# Patient Record
Sex: MALE | Race: WHITE | NOT HISPANIC OR LATINO | URBAN - METROPOLITAN AREA
[De-identification: names, ages, dates, MRNs, and addresses within clinical notes are randomized per-mention and may not be internally consistent; named-entity substitution may affect disease eponyms.]

---

## 2019-09-30 ENCOUNTER — INPATIENT (INPATIENT)
Facility: HOSPITAL | Age: 34
LOS: 3 days | Discharge: HOME | DRG: 638 | End: 2019-10-04
Attending: HOSPITALIST | Admitting: STUDENT IN AN ORGANIZED HEALTH CARE EDUCATION/TRAINING PROGRAM
Payer: MEDICAID

## 2019-09-30 VITALS
SYSTOLIC BLOOD PRESSURE: 133 MMHG | DIASTOLIC BLOOD PRESSURE: 94 MMHG | RESPIRATION RATE: 18 BRPM | WEIGHT: 270.07 LBS | HEIGHT: 72 IN | HEART RATE: 119 BPM | TEMPERATURE: 98 F | OXYGEN SATURATION: 97 %

## 2019-09-30 DIAGNOSIS — E08.10 DIABETES MELLITUS DUE TO UNDERLYING CONDITION WITH KETOACIDOSIS WITHOUT COMA: ICD-10-CM

## 2019-09-30 LAB
ALBUMIN SERPL ELPH-MCNC: 4.6 G/DL — SIGNIFICANT CHANGE UP (ref 3.3–5)
ALP SERPL-CCNC: 79 U/L — SIGNIFICANT CHANGE UP (ref 40–120)
ALT FLD-CCNC: 76 U/L — HIGH (ref 10–45)
ANION GAP SERPL CALC-SCNC: 21 MMOL/L — HIGH (ref 5–17)
ANION GAP SERPL CALC-SCNC: 26 MMOL/L — HIGH (ref 5–17)
APPEARANCE UR: CLEAR — SIGNIFICANT CHANGE UP
AST SERPL-CCNC: 29 U/L — SIGNIFICANT CHANGE UP (ref 10–40)
BACTERIA # UR AUTO: NEGATIVE — SIGNIFICANT CHANGE UP
BASE EXCESS BLDV CALC-SCNC: -14.7 MMOL/L — LOW (ref -2–2)
BASOPHILS # BLD AUTO: 0 K/UL — SIGNIFICANT CHANGE UP (ref 0–0.2)
BASOPHILS NFR BLD AUTO: 0.4 % — SIGNIFICANT CHANGE UP (ref 0–2)
BILIRUB SERPL-MCNC: 0.4 MG/DL — SIGNIFICANT CHANGE UP (ref 0.2–1.2)
BILIRUB UR-MCNC: NEGATIVE — SIGNIFICANT CHANGE UP
BUN SERPL-MCNC: 10 MG/DL — SIGNIFICANT CHANGE UP (ref 7–23)
BUN SERPL-MCNC: 8 MG/DL — SIGNIFICANT CHANGE UP (ref 7–23)
CA-I SERPL-SCNC: 1.19 MMOL/L — SIGNIFICANT CHANGE UP (ref 1.12–1.3)
CALCIUM SERPL-MCNC: 8.3 MG/DL — LOW (ref 8.4–10.5)
CALCIUM SERPL-MCNC: 9 MG/DL — SIGNIFICANT CHANGE UP (ref 8.4–10.5)
CHLORIDE BLDV-SCNC: 109 MMOL/L — HIGH (ref 96–108)
CHLORIDE SERPL-SCNC: 101 MMOL/L — SIGNIFICANT CHANGE UP (ref 96–108)
CHLORIDE SERPL-SCNC: 104 MMOL/L — SIGNIFICANT CHANGE UP (ref 96–108)
CO2 BLDV-SCNC: 12 MMOL/L — LOW (ref 22–30)
CO2 SERPL-SCNC: 11 MMOL/L — LOW (ref 22–31)
CO2 SERPL-SCNC: 8 MMOL/L — CRITICAL LOW (ref 22–31)
COLOR SPEC: SIGNIFICANT CHANGE UP
CREAT SERPL-MCNC: 0.69 MG/DL — SIGNIFICANT CHANGE UP (ref 0.5–1.3)
CREAT SERPL-MCNC: 0.78 MG/DL — SIGNIFICANT CHANGE UP (ref 0.5–1.3)
DIFF PNL FLD: NEGATIVE — SIGNIFICANT CHANGE UP
EOSINOPHIL # BLD AUTO: 0.1 K/UL — SIGNIFICANT CHANGE UP (ref 0–0.5)
EOSINOPHIL NFR BLD AUTO: 1.5 % — SIGNIFICANT CHANGE UP (ref 0–6)
EPI CELLS # UR: 1 — SIGNIFICANT CHANGE UP
GAS PNL BLDV: 136 MMOL/L — SIGNIFICANT CHANGE UP (ref 135–145)
GAS PNL BLDV: SIGNIFICANT CHANGE UP
GLUCOSE BLDV-MCNC: 348 MG/DL — HIGH (ref 70–99)
GLUCOSE SERPL-MCNC: 295 MG/DL — HIGH (ref 70–99)
GLUCOSE SERPL-MCNC: 375 MG/DL — HIGH (ref 70–99)
GLUCOSE UR QL: ABNORMAL
HCO3 BLDV-SCNC: 11 MMOL/L — LOW (ref 21–29)
HCT VFR BLD CALC: 41.4 % — SIGNIFICANT CHANGE UP (ref 39–50)
HCT VFR BLD CALC: 47 % — SIGNIFICANT CHANGE UP (ref 39–50)
HCT VFR BLDA CALC: 50 % — SIGNIFICANT CHANGE UP (ref 39–50)
HGB BLD CALC-MCNC: 16.5 G/DL — SIGNIFICANT CHANGE UP (ref 13–17)
HGB BLD-MCNC: 14.3 G/DL — SIGNIFICANT CHANGE UP (ref 13–17)
HGB BLD-MCNC: 16.2 G/DL — SIGNIFICANT CHANGE UP (ref 13–17)
HYALINE CASTS # UR AUTO: 1 /LPF — SIGNIFICANT CHANGE UP (ref 0–7)
KETONES UR-MCNC: ABNORMAL
LACTATE BLDV-MCNC: 1.4 MMOL/L — SIGNIFICANT CHANGE UP (ref 0.7–2)
LEUKOCYTE ESTERASE UR-ACNC: NEGATIVE — SIGNIFICANT CHANGE UP
LYMPHOCYTES # BLD AUTO: 2 K/UL — SIGNIFICANT CHANGE UP (ref 1–3.3)
LYMPHOCYTES # BLD AUTO: 21.5 % — SIGNIFICANT CHANGE UP (ref 13–44)
MAGNESIUM SERPL-MCNC: 1.9 MG/DL — SIGNIFICANT CHANGE UP (ref 1.6–2.6)
MAGNESIUM SERPL-MCNC: 2 MG/DL — SIGNIFICANT CHANGE UP (ref 1.6–2.6)
MCHC RBC-ENTMCNC: 29.9 PG — SIGNIFICANT CHANGE UP (ref 27–34)
MCHC RBC-ENTMCNC: 30.1 PG — SIGNIFICANT CHANGE UP (ref 27–34)
MCHC RBC-ENTMCNC: 34.5 GM/DL — SIGNIFICANT CHANGE UP (ref 32–36)
MCHC RBC-ENTMCNC: 34.5 GM/DL — SIGNIFICANT CHANGE UP (ref 32–36)
MCV RBC AUTO: 86.6 FL — SIGNIFICANT CHANGE UP (ref 80–100)
MCV RBC AUTO: 87.1 FL — SIGNIFICANT CHANGE UP (ref 80–100)
MONOCYTES # BLD AUTO: 0.9 K/UL — SIGNIFICANT CHANGE UP (ref 0–0.9)
MONOCYTES NFR BLD AUTO: 9.4 % — SIGNIFICANT CHANGE UP (ref 2–14)
NEUTROPHILS # BLD AUTO: 6.3 K/UL — SIGNIFICANT CHANGE UP (ref 1.8–7.4)
NEUTROPHILS NFR BLD AUTO: 67.2 % — SIGNIFICANT CHANGE UP (ref 43–77)
NITRITE UR-MCNC: NEGATIVE — SIGNIFICANT CHANGE UP
OSMOLALITY SERPL: 314 MOSMOL/KG — HIGH (ref 275–300)
PCO2 BLDV: 28 MMHG — LOW (ref 35–50)
PH BLDV: 7.24 — LOW (ref 7.35–7.45)
PH UR: 5.5 — SIGNIFICANT CHANGE UP (ref 5–8)
PHOSPHATE SERPL-MCNC: 2.7 MG/DL — SIGNIFICANT CHANGE UP (ref 2.5–4.5)
PLATELET # BLD AUTO: 164 K/UL — SIGNIFICANT CHANGE UP (ref 150–400)
PLATELET # BLD AUTO: 194 K/UL — SIGNIFICANT CHANGE UP (ref 150–400)
PO2 BLDV: 38 MMHG — SIGNIFICANT CHANGE UP (ref 25–45)
POTASSIUM BLDV-SCNC: 3.1 MMOL/L — LOW (ref 3.5–5.3)
POTASSIUM SERPL-MCNC: 2.9 MMOL/L — CRITICAL LOW (ref 3.5–5.3)
POTASSIUM SERPL-MCNC: 3.3 MMOL/L — LOW (ref 3.5–5.3)
POTASSIUM SERPL-SCNC: 2.9 MMOL/L — CRITICAL LOW (ref 3.5–5.3)
POTASSIUM SERPL-SCNC: 3.3 MMOL/L — LOW (ref 3.5–5.3)
PROT SERPL-MCNC: 7.5 G/DL — SIGNIFICANT CHANGE UP (ref 6–8.3)
PROT UR-MCNC: ABNORMAL
RBC # BLD: 4.75 M/UL — SIGNIFICANT CHANGE UP (ref 4.2–5.8)
RBC # BLD: 5.43 M/UL — SIGNIFICANT CHANGE UP (ref 4.2–5.8)
RBC # FLD: 12.3 % — SIGNIFICANT CHANGE UP (ref 10.3–14.5)
RBC # FLD: 12.4 % — SIGNIFICANT CHANGE UP (ref 10.3–14.5)
RBC CASTS # UR COMP ASSIST: 3 /HPF — SIGNIFICANT CHANGE UP (ref 0–4)
SAO2 % BLDV: 69 % — SIGNIFICANT CHANGE UP (ref 67–88)
SODIUM SERPL-SCNC: 135 MMOL/L — SIGNIFICANT CHANGE UP (ref 135–145)
SODIUM SERPL-SCNC: 136 MMOL/L — SIGNIFICANT CHANGE UP (ref 135–145)
SP GR SPEC: 1.03 — HIGH (ref 1.01–1.02)
UROBILINOGEN FLD QL: NEGATIVE — SIGNIFICANT CHANGE UP
WBC # BLD: 8.2 K/UL — SIGNIFICANT CHANGE UP (ref 3.8–10.5)
WBC # BLD: 9.4 K/UL — SIGNIFICANT CHANGE UP (ref 3.8–10.5)
WBC # FLD AUTO: 8.2 K/UL — SIGNIFICANT CHANGE UP (ref 3.8–10.5)
WBC # FLD AUTO: 9.4 K/UL — SIGNIFICANT CHANGE UP (ref 3.8–10.5)
WBC UR QL: 1 /HPF — SIGNIFICANT CHANGE UP (ref 0–5)

## 2019-09-30 PROCEDURE — 99291 CRITICAL CARE FIRST HOUR: CPT

## 2019-09-30 PROCEDURE — 71045 X-RAY EXAM CHEST 1 VIEW: CPT | Mod: 26

## 2019-09-30 RX ORDER — POTASSIUM CHLORIDE 20 MEQ
10 PACKET (EA) ORAL
Refills: 0 | Status: COMPLETED | OUTPATIENT
Start: 2019-09-30 | End: 2019-09-30

## 2019-09-30 RX ORDER — POTASSIUM CHLORIDE 20 MEQ
40 PACKET (EA) ORAL ONCE
Refills: 0 | Status: COMPLETED | OUTPATIENT
Start: 2019-09-30 | End: 2019-09-30

## 2019-09-30 RX ORDER — FAMOTIDINE 10 MG/ML
20 INJECTION INTRAVENOUS ONCE
Refills: 0 | Status: DISCONTINUED | OUTPATIENT
Start: 2019-09-30 | End: 2019-09-30

## 2019-09-30 RX ORDER — POTASSIUM CHLORIDE 20 MEQ
10 PACKET (EA) ORAL
Refills: 0 | Status: COMPLETED | OUTPATIENT
Start: 2019-09-30 | End: 2019-10-01

## 2019-09-30 RX ORDER — CHLORHEXIDINE GLUCONATE 213 G/1000ML
1 SOLUTION TOPICAL
Refills: 0 | Status: DISCONTINUED | OUTPATIENT
Start: 2019-09-30 | End: 2019-10-02

## 2019-09-30 RX ORDER — SODIUM CHLORIDE 9 MG/ML
1000 INJECTION INTRAMUSCULAR; INTRAVENOUS; SUBCUTANEOUS
Refills: 0 | Status: DISCONTINUED | OUTPATIENT
Start: 2019-09-30 | End: 2019-10-01

## 2019-09-30 RX ORDER — FAMOTIDINE 10 MG/ML
20 INJECTION INTRAVENOUS ONCE
Refills: 0 | Status: COMPLETED | OUTPATIENT
Start: 2019-09-30 | End: 2019-09-30

## 2019-09-30 RX ORDER — INFLUENZA VIRUS VACCINE 15; 15; 15; 15 UG/.5ML; UG/.5ML; UG/.5ML; UG/.5ML
0.5 SUSPENSION INTRAMUSCULAR ONCE
Refills: 0 | Status: COMPLETED | OUTPATIENT
Start: 2019-09-30 | End: 2019-10-04

## 2019-09-30 RX ORDER — SODIUM CHLORIDE 9 MG/ML
2000 INJECTION, SOLUTION INTRAVENOUS ONCE
Refills: 0 | Status: COMPLETED | OUTPATIENT
Start: 2019-09-30 | End: 2019-09-30

## 2019-09-30 RX ADMIN — Medication 100 MILLIEQUIVALENT(S): at 23:38

## 2019-09-30 RX ADMIN — Medication 40 MILLIEQUIVALENT(S): at 20:11

## 2019-09-30 RX ADMIN — Medication 100 MILLIEQUIVALENT(S): at 22:30

## 2019-09-30 RX ADMIN — Medication 100 MILLIEQUIVALENT(S): at 20:11

## 2019-09-30 RX ADMIN — SODIUM CHLORIDE 150 MILLILITER(S): 9 INJECTION INTRAMUSCULAR; INTRAVENOUS; SUBCUTANEOUS at 22:00

## 2019-09-30 RX ADMIN — FAMOTIDINE 20 MILLIGRAM(S): 10 INJECTION INTRAVENOUS at 19:11

## 2019-09-30 RX ADMIN — Medication 40 MILLIEQUIVALENT(S): at 23:37

## 2019-09-30 RX ADMIN — Medication 100 MILLIEQUIVALENT(S): at 21:16

## 2019-09-30 RX ADMIN — SODIUM CHLORIDE 2000 MILLILITER(S): 9 INJECTION, SOLUTION INTRAVENOUS at 18:34

## 2019-09-30 RX ADMIN — SODIUM CHLORIDE 1000 MILLILITER(S): 9 INJECTION, SOLUTION INTRAVENOUS at 23:39

## 2019-09-30 RX ADMIN — Medication 30 MILLILITER(S): at 19:10

## 2019-09-30 NOTE — H&P ADULT - NSHPPHYSICALEXAM_GEN_ALL_CORE
PHYSICAL EXAM:  General:   HEENT:   Lymph Nodes:  Neck:   Respiratory:   Cardiovascular:   Abdomen:   Extremities:   Skin:   Neurological:  Psychiatry: PHYSICAL EXAM:  General: NAD, pleasant and conversative  HEENT: PERRLA, normal ocular movements  Lymph Nodes: no lymphadenopathy present  Neck: supple neck, no thyromegaly  Respiratory: lungs clear to auscultation bilaterally, no rales, no wheezing, no rhonchi  Cardiovascular: Normal S1, S2, RRR, no murmurs, rubs or gallops  Abdomen: soft, nontender, non-distended, bowel sounds present  Extremities: no deformities, no lower extremity edema  Skin: intact  Neurological: A&o x4  Psychiatry: pleasant mood, normal affect PHYSICAL EXAM:    OBJECTIVE:  ICU Vital Signs Last 24 Hrs  T(C): 36.5 (30 Sep 2019 19:10), Max: 36.5 (30 Sep 2019 16:55)  T(F): 97.7 (30 Sep 2019 19:10), Max: 97.7 (30 Sep 2019 16:55)  HR: 88 (30 Sep 2019 20:54) (88 - 119)  BP: 129/82 (30 Sep 2019 20:54) (129/82 - 140/95)  RR: 19 (30 Sep 2019 20:54) (18 - 19)  SpO2: 98% (30 Sep 2019 20:54) (97% - 100%)    General: NAD, pleasant and conversant  HEENT: PERRLA, normal ocular movements  Lymph Nodes: no lymphadenopathy present  Neck: supple neck, no thyromegaly  Respiratory: lungs clear to auscultation bilaterally, no rales, no wheezing, no rhonchi  Cardiovascular: Normal S1, S2, RRR, no murmurs, rubs or gallops  Abdomen: soft, nontender, non-distended, bowel sounds present  Extremities: no deformities, no lower extremity edema  Skin: intact  Neurological: A&o x4  Psychiatry: pleasant mood, normal affect

## 2019-09-30 NOTE — H&P ADULT - HISTORY OF PRESENT ILLNESS
HPI:  Patient is a 33yom with pmh of obesity presents with abdominal pain for 2 weeks, increased polydypsia and polyuria for last few days (urinating up to 25 times a day). He was meeting for life insurance and had blood work performed with reported elevated lft. No known h/o diabetes. Reports intermittent abdominal discomfort to upper abdomen. no vomiting or diarrhea. has been feeling fatigued. no black or bloody stools. denies any dysuria. no chest pain.    In the ED, finger stick was 373. K+ 2.9, VBG showed pH 7.24, HCO3 8, AG 26, lactate 1.4. beta hydroxy butyrate is pending now. Patient was given 2L of LR and transferred to the MICU for DKA.     PAST MEDICAL & SURGICAL HISTORY:  obesity    FAMILY HISTORY:  none      HOME MEDICATIONS:    CAPILLARY BLOOD GLUCOSE    POCT Blood Glucose.: 306 mg/dL (30 Sep 2019 20:10)    LINES:     HOSPITAL MEDICATIONS:  MEDICATIONS  (STANDING):  chlorhexidine 4% Liquid 1 Application(s) Topical <User Schedule>  potassium chloride  10 mEq/100 mL IVPB 10 milliEquivalent(s) IV Intermittent every 1 hour    MEDICATIONS  (PRN):    MICROBIOLOGY:     RADIOLOGY:  CXR: Prelim read - no emergent findings    EKG: HPI:  Patient is a 33yom with pmh of obesity presents with abdominal pain for 2 weeks, increased polydypsia and polyuria for last few days (urinating up to 25 times a day). He was meeting for life insurance and had blood work performed with reported elevated lft. No known h/o diabetes. Reports intermittent abdominal discomfort in the lower abdomin and some pain in the RUQ. Pain is intermittent, may worsen with food and water (up to 7/10) and improves when patient is lying flat on the bed. Reports decreased appetite, weight loss, and decreased bowel movements (once every 3 days and his norm is once a day) with constipation. Patient denies vomiting and diarrhea. Has been feeling fatigued. no black or bloody stools. Denies chest pain.    In the ED, finger stick was 373. K+ 2.9, VBG showed pH 7.24, HCO3 8, AG 26, lactate 1.4. beta hydroxy butyrate is pending now. Patient was given 2L of LR and transferred to the MICU for DKA.     PAST MEDICAL & SURGICAL HISTORY:  obesity    FAMILY HISTORY:  One cousin had diabetes, mom had gyn cancer    HOME MEDICATIONS:  none    CAPILLARY BLOOD GLUCOSE: POCT Blood Glucose.: 306 mg/dL (30 Sep 2019 20:10)    LINES:     HOSPITAL MEDICATIONS:  MEDICATIONS  (STANDING):  chlorhexidine 4% Liquid 1 Application(s) Topical <User Schedule>  potassium chloride  10 mEq/100 mL IVPB 10 milliEquivalent(s) IV Intermittent every 1 hour    MEDICATIONS  (PRN):    MICROBIOLOGY:     RADIOLOGY:  CXR: Prelim read - no emergent findings    EKG: HPI:  Patient is a 33yom with pmh of morbid obesity (BMI 48.3) presents with abdominal pain for 2 weeks, increased polydypsia and polyuria for last few days (urinating up to 25 times a day). He was meeting for life insurance and had blood work performed with reported elevated lft. No known h/o diabetes. Reports intermittent abdominal discomfort in the lower abdomin and some pain in the RUQ. Pain is intermittent, may worsen with food and water (up to 7/10) and improves when patient is lying flat on the bed. Reports decreased appetite, weight loss, and decreased bowel movements (once every 3 days and his norm is once a day) with constipation. Patient denies vomiting and diarrhea. Has been feeling fatigued. no black or bloody stools. Denies chest pain.    In the ED, finger stick was 373. K+ 2.9, VBG showed pH 7.24, HCO3 8, AG 26, lactate 1.4. beta hydroxy butyrate is pending now. Patient was given 2L of LR and transferred to the MICU for DKA.     PAST MEDICAL & SURGICAL HISTORY:  obesity    FAMILY HISTORY:  One cousin had diabetes, mom had gyn cancer    HOME MEDICATIONS:  none    CAPILLARY BLOOD GLUCOSE: POCT Blood Glucose.: 306 mg/dL (30 Sep 2019 20:10)    LINES:     HOSPITAL MEDICATIONS:  MEDICATIONS  (STANDING):  chlorhexidine 4% Liquid 1 Application(s) Topical <User Schedule>  potassium chloride  10 mEq/100 mL IVPB 10 milliEquivalent(s) IV Intermittent every 1 hour    MEDICATIONS  (PRN):    MICROBIOLOGY: none    RADIOLOGY:  CXR: Prelim read - no emergent findings    EKG:

## 2019-09-30 NOTE — H&P ADULT - NSHPSOCIALHISTORY_GEN_ALL_CORE
SOCIAL HISTORY:  Smoking: [ ] Never Smoked [ ] Former Smoker (__ packs x ___ years) [ ] Current Smoker  (__ packs x ___ years)  Substance Use: [ ] Never Used [ ] Used ____  EtOH Use:  Marital Status: [ ] Single [ ]  [ ]  [ ]   Sexual History:   Occupation:  Recent Travel:  Country of Birth:  Advance Directives: SOCIAL HISTORY:  Smoking: [ ] Never Smoked [x] Former Smoker 1 pack a day for 3-4 years, quit 3 weeks ago [ ] Current Smoker  (__ packs x ___ years)  Substance Use: [x] Never Used [ ] Used ____  EtOH Use: doesn't drink  Marital Status: [ ] Single [x]  [ ]  [ ]   Sexual History:  Occupation:   Recent Travel: used to travel back and forth from [facility], but now lives in [place]  Advance Directives: Full code

## 2019-09-30 NOTE — ED ADULT NURSE NOTE - OBJECTIVE STATEMENT
34 y/o male presents to ED reporting two days of bilateral lower abdominal tenderness. Pt also reports increased urinary frequency and recent blood test results of increased liver enzymes. On exam, AOx3, speaking in complete sentences. Lung sounds CTA, NAD. Abdomen soft, tender bilateral lower quadrants, non-distended. Pt denies CP, SOB, n/v/d, fever/chills at this time. Seen and evaluated by MD. Hellen placed, labs sent. Family at bedside.

## 2019-09-30 NOTE — ED PROVIDER NOTE - CLINICAL SUMMARY MEDICAL DECISION MAKING FREE TEXT BOX
Attending Diana Koroma: 33 y/oale without sig pmh however has not seen a doctor in many y ears presenting with polyuria and polydipsia. on exam abdomen soft and nontender. pt tachypnic with dry mucous membranes. elevated fingerstick. concern for new onset diabetes and DKA. pt started on iv hydration. will send serum osm, betahydroxy, cmp and vbg. will likey need insulin after K resulted. no testicular pain or evidence of fourniers

## 2019-09-30 NOTE — ED PROVIDER NOTE - ATTENDING CONTRIBUTION TO CARE
Attending MD Diana Koroma:   I personally have seen and examined this patient.  Physician assistant note reviewed and agree on plan of care and except where noted.  See HPI, PE, and MDM for details.

## 2019-09-30 NOTE — H&P ADULT - ATTENDING COMMENTS
care provided 9/30/19  critical care time 40 mins  Patient seen and examined.  Agree with resident note as above.  Patient with hx as noted including morbid obesity who presents with migrating abdominal pain, polydipsia, polyuria and found in the ER to have labs c/w DKA.  No obvious exacerbant.  Also with severe hypokalemia and volume depletion.  Will tx to MICU for further care.  First volume resuscitation and potassium repletion, will begin insulin therapy with continued potassium repletion once K>3.5.  Full plan as above and I have edited as appropriate.

## 2019-09-30 NOTE — ED ADULT NURSE REASSESSMENT NOTE - NS ED NURSE REASSESS COMMENT FT1
Patient received bed assignment in MICU. Patient aware of assignment. Report given to receiving ALDA Navas. VSS. Patient stable for transport. Chart given to charge desk. Safety and comfort maintained while in ED.
Report received from ALDA Chen

## 2019-09-30 NOTE — H&P ADULT - ASSESSMENT
33 year old otherwise healthy main presents with abdominal pain, polyuria and polydipsia likely presenting with DKA.     #Neuro  -A&Ox4, no issues, will continue to monitor    #Endo    #Resp    #CV    #GI    #ID    #Renal    #Heme        #DVT PPx Mr. Pearson is a pleasant 33 yom with morbid obesity (BMI 48.3) presenting with abdominal pain, polyuria and polydipsia likely secondary to DKA and new onset diabetes.     #Neuro  -A&Ox4, no issues, will continue to monitor    #Endo  DKA, likely T2DM given age and body habitus  - no clear source yet as of it. Lipase negative, no diarrhea, no URI or PNA  - VBG showed pH 7.24, HCO3 8, AG 26, lactate 1.4. beta hydroxy butyrate >8. large ketones in the urine.   - K2.9, currently repleting before giving any insulin  - s/p 2L LRs, and started on NS at 150/hr  - BMP q4h  - patient is insulin naive, will start insulin gtt at 3/hr  - Endo consult in the AM  - f/u HbA1C    #Resp  -Lungs clear, will continue to monitor    #CV  -no cardiac issues  -will get baseline lipids given age, BMI, hx of smoking    #GI  - diabetic clears for no, patient denies nausea and vomiting  - lipase negative, patient likely not having pancreatitis, or any other acute GI processes    #ID  -UA negative, no clear source of infection currently    #Renal/  -polyuria likely secondary  -will f/u BMP and monitor Is/Os    #Heme  -No white count, no issues    #DVT PPx  -low risk for DVT, encourage patient to walk and move around Mr. Pearson is a pleasant 33 yom with morbid obesity (BMI 48.3) presenting with abdominal pain, polyuria and polydipsia likely secondary to DKA and new onset diabetes.     #Neuro  -A&Ox4, no issues, will continue to monitor    #Endo  DKA, likely T2DM given age and body habitus  - no clear source yet as of it. Lipase negative, no diarrhea, no URI or PNA  - VBG showed pH 7.24, HCO3 8, AG 26, lactate 1.4. beta hydroxy butyrate >8. large ketones in the urine.   - K2.9, currently repleting (30IV and 40 oral) before giving any insulin  - s/p 2L LRs, and started on NS at 150/hr  - BMP q4h  - patient is insulin naive, will start insulin gtt at 3/hr  - Endo consult in the AM  - f/u HbA1C    #Resp  -Lungs clear, will continue to monitor    #CV  -no cardiac issues  -will get baseline lipids given age, BMI, hx of smoking    #GI  - diabetic clears for no, patient denies nausea and vomiting  - lipase negative, patient likely not having pancreatitis, or any other acute GI processes    #ID  -UA negative, no clear source of infection currently    #Renal/  -polyuria likely secondary  -will f/u BMP and monitor Is/Os    #Heme  -No white count, no issues    #DVT PPx  -low risk for DVT, encourage patient to walk and move around Mr. Pearson is a pleasant 33 yom with morbid obesity (BMI 48.3) presenting with abdominal pain, polyuria and polydipsia likely secondary to DKA and new onset diabetes.     #Neuro  -A&Ox4, no issues, will continue to monitor    #Endo  DKA, likely T2DM given age and body habitus  - no clear source yet as of it. Lipase negative, no diarrhea, no URI or PNA  - VBG showed pH 7.24, HCO3 8, AG 26, lactate 1.4. beta hydroxy butyrate >8. large ketones in the urine.   - K2.9, currently repleting (30IV and 40 oral) before giving any insulin  - s/p 2L LRs, and started on NS at 150/hr  - BMP q4h  - patient is insulin naive, will start insulin gtt at 3/hr  - Endo consult in the AM  - f/u HbA1C    #Resp  -Lungs clear, will continue to monitor    #CV  -no cardiac issues  -will get baseline lipids given age, BMI, hx of smoking    #GI  Patient has symptoms of biliary colic, likely due to biliary sludge or stones  - diabetic clears for now, patient denies nausea and vomiting  - lipase negative, ALT 76.   - will f/u RUQ ultrasound to look for NAFLD or biliary issues.     #ID  -UA negative, no clear source of infection currently    #Renal/  -polyuria likely secondary  -will f/u BMP and monitor Is/Os    #Heme  -No white count, no issues    #DVT PPx  -low risk for DVT, encourage patient to walk and move around Mr. Pearson is a pleasant 33 yom with morbid obesity (BMI 48.3) presenting with abdominal pain, polyuria and polydipsia, found to have DKA and new onset diabetes.     #Neuro  -A&Ox4, no issues, will continue to monitor    #Endo  DKA, likely T2DM given age and body habitus  - no clear exacerbant as of yet. Lipase negative, no diarrhea, no URI or PNA  - VBG showed pH 7.24, HCO3 8, AG 26, lactate 1.4. beta hydroxy butyrate >8. large ketones in the urine.   - K2.9, currently repleting (30IV and 40 oral) before giving any insulin  - s/p 2L LRs, and started on NS at 150/hr  - BMP q4h  - patient is insulin naive, will start insulin gtt at 3/hr  - Endo consult in the AM  - f/u HbA1C    #Resp  -Lungs clear, will continue to monitor    #CV  -no cardiac issues  -will get baseline lipids given age, BMI, hx of smoking    #GI  Patient has symptoms of biliary colic, likely due to biliary sludge or stones  - diabetic clears for now, patient denies nausea and vomiting  - lipase negative, ALT 76.   - will f/u RUQ ultrasound to look for NAFLD or biliary issues.     #ID  -UA negative, no clear source of infection currently    #Renal/  -polyuria likely secondary  -will f/u BMP and monitor Is/Os    #Heme  -No white count, no issues    #DVT PPx  -low risk for DVT, encourage patient to walk and move around

## 2019-09-30 NOTE — ED PROVIDER NOTE - CARE PLAN
Principal Discharge DX:	Diabetic ketoacidosis without coma associated with diabetes mellitus due to underlying condition

## 2019-09-30 NOTE — ED PROVIDER NOTE - NS ED ROS FT
Constitutional: No fever or chills  Eyes: No visual changes, eye pain or redness  HEENT: No throat pain, ear pain, nasal pain. No nose bleeding.  CV: No chest pain or lower extremity edema  Resp: No SOB no cough  GI:see hpi  : see hpi  MSK: No musculoskeletal pain  Skin: No rash  Neuro: No headache. No numbness or tingling. No weakness.

## 2019-09-30 NOTE — ED PROVIDER NOTE - PHYSICAL EXAMINATION
Attending Diana Koroma: Gen: NAD, heent: atrauamtic, eomi, perrla, dry mucous membranes, op pink, uvula midline, neck; nttp, no nuchal rigidity, chest: nttp, no crepitus, cv: tachycardic, no murmurs, lungs: ctab, abd: soft, nontender, nondistended, no peritoneal signs, +BS, no guarding, ext: wwp, neg homans, skin: erythema to b/l inguinal crease, no crepitus, neuro: awake and alert, following commands, speech clear, sensation and strength intact, no focal deficits

## 2019-09-30 NOTE — ED PROVIDER NOTE - OBJECTIVE STATEMENT
Attending Diana Koroma: 32 y/o male without sig pmh however has not seen a doctor in a long time presenting with increased polydypsia and poluria for last few days. states he was meeting for life insurance and had blood work performed with reported elevated lft. no known h/o diabetes. reports intermittent abdominal discomfort to upper abdomen. no vomiting or diarrhea. has been feeling fatigued. no black or bloody stools. denies any dysuria. no chest pain. no LE swelling

## 2019-09-30 NOTE — H&P ADULT - NSHPREVIEWOFSYSTEMS_GEN_ALL_CORE
REVIEW OF SYSTEMS:  Constitutional: [ ] fevers [ ] chills [ ] weight loss [ ] weight gain  HEENT: [ ] dry eyes [ ] eye irritation [ ] postnasal drip [ ] nasal congestion  CV: [ ] chest pain [ ] orthopnea [ ] palpitations [ ] murmur  Resp: [ ] cough [ ] shortness of breath [ ] dyspnea [ ] wheezing [ ] sputum [ ] hemoptysis  GI: [ ] nausea [ ] vomiting [ ] diarrhea [ ] constipation [ ] abd pain [ ] dysphagia   : [ ] dysuria [ ] nocturia [ ] hematuria [ ] increased urinary frequency  Musculoskeletal: [ ] back pain [ ] myalgias [ ] arthralgias [ ] fracture  Skin: [ ] rash [ ] itch  Neurological: [ ] headache [ ] dizziness [ ] syncope [ ] weakness [ ] numbness  Psychiatric: [ ] anxiety [ ] depression  Endocrine: [ ] diabetes [ ] thyroid problem  Hematologic/Lymphatic: [ ] anemia [ ] bleeding problem  Allergic/Immunologic: [ ] itchy eyes [ ] nasal discharge [ ] hives [ ] angioedema  [ ] All other systems negative  [ ] Unable to assess ROS because ________ REVIEW OF SYSTEMS:  Constitutional: [ ] fevers [ ] chills [x] weight loss [ ] weight gain  HEENT: [ ] dry eyes [ ] eye irritation [ ] postnasal drip [ ] nasal congestion  CV: [ ] chest pain [ ] orthopnea [ ] palpitations [ ] murmur  Resp: [ ] cough [ ] shortness of breath [ ] dyspnea [ ] wheezing [ ] sputum [ ] hemoptysis  GI: [ ] nausea [ ] vomiting [ ] diarrhea [x] constipation [x] abd pain - RUQ and Lower abdominal pain 7/10 [ ] dysphagia   : [ ] dysuria [ ] nocturia [ ] hematuria [x] increased urinary frequency  Musculoskeletal: [ ] back pain [ ] myalgias [ ] arthralgias [ ] fracture  Skin: [ ] rash [ ] itch  Neurological: [ ] headache [ ] dizziness [ ] syncope [x] weakness [ ] numbness  Psychiatric: [ ] anxiety [ ] depression  Endocrine: [x] diabetes [ ] thyroid problem  Hematologic/Lymphatic: [ ] anemia [ ] bleeding problem  Allergic/Immunologic: [ ] itchy eyes [ ] nasal discharge [ ] hives [ ] angioedema  [x] All other systems negative  [ ] Unable to assess ROS because ________

## 2019-09-30 NOTE — ED PROVIDER NOTE - PROGRESS NOTE DETAILS
Patient's labs are reviewed. Pt ntoed to be in DKA. MICU consulted. replete potassium. repeat . [id number]

## 2019-09-30 NOTE — H&P ADULT - NSHPLABSRESULTS_GEN_ALL_CORE
OBJECTIVE:  ICU Vital Signs Last 24 Hrs  T(C): 36.5 (30 Sep 2019 19:10), Max: 36.5 (30 Sep 2019 16:55)  T(F): 97.7 (30 Sep 2019 19:10), Max: 97.7 (30 Sep 2019 16:55)  HR: 88 (30 Sep 2019 20:54) (88 - 119)  BP: 129/82 (30 Sep 2019 20:54) (129/82 - 140/95)  BP(mean): --  ABP: --  ABP(mean): --  RR: 19 (30 Sep 2019 20:54) (18 - 19)  SpO2: 98% (30 Sep 2019 20:54) (97% - 100%)    LABS:                        16.2   9.4   )-----------( 194      ( 30 Sep 2019 18:45 )             47.0     Hgb Trend: 16.2<--      135  |  101  |  10  ----------------------------<  375<H>  2.9<LL>   |  8<LL>  |  0.78    Ca    9.0      30 Sep 2019 18:45  Mg     2.0         TPro  7.5  /  Alb  4.6  /  TBili  0.4  /  DBili  x   /  AST  29  /  ALT  76<H>  /  AlkPhos  79      Creatinine Trend: 0.78<--    Urinalysis Basic - ( 30 Sep 2019 18:58 )    Color: Light Yellow / Appearance: Clear / S.032 / pH: x  Gluc: x / Ketone: Large  / Bili: Negative / Urobili: Negative   Blood: x / Protein: 30 mg/dL / Nitrite: Negative   Leuk Esterase: Negative / RBC: 3 /hpf / WBC 1 /HPF   Sq Epi: x / Non Sq Epi: 1 / Bacteria: Negative        Venous Blood Gas:   @ 18:45  7.24/28/38/  VBG Lactate: 1.4 LABS:                        16.2   9.4   )-----------( 194      ( 30 Sep 2019 18:45 )             47.0     Hgb Trend: 16.2<--      135  |  101  |  10  ----------------------------<  375<H>  2.9<LL>   |  8<LL>  |  0.78    Ca    9.0      30 Sep 2019 18:45  Mg     2.0         TPro  7.5  /  Alb  4.6  /  TBili  0.4  /  DBili  x   /  AST  29  /  ALT  76<H>  /  AlkPhos  79      Creatinine Trend: 0.78<--    Urinalysis Basic - ( 30 Sep 2019 18:58 )    Color: Light Yellow / Appearance: Clear / S.032 / pH: x  Gluc: x / Ketone: Large  / Bili: Negative / Urobili: Negative   Blood: x / Protein: 30 mg/dL / Nitrite: Negative   Leuk Esterase: Negative / RBC: 3 /hpf / WBC 1 /HPF   Sq Epi: x / Non Sq Epi: 1 / Bacteria: Negative        Venous Blood Gas:   @ 18:45  7.24/28/38/11/69  VBG Lactate: 1.4    Radiology:  Reviewed and interpreted by me.  CXR- low lung volumes, no infiltrate

## 2019-10-01 DIAGNOSIS — E66.01 MORBID (SEVERE) OBESITY DUE TO EXCESS CALORIES: ICD-10-CM

## 2019-10-01 DIAGNOSIS — E11.10 TYPE 2 DIABETES MELLITUS WITH KETOACIDOSIS WITHOUT COMA: ICD-10-CM

## 2019-10-01 LAB
ANION GAP SERPL CALC-SCNC: 14 MMOL/L — SIGNIFICANT CHANGE UP (ref 5–17)
ANION GAP SERPL CALC-SCNC: 14 MMOL/L — SIGNIFICANT CHANGE UP (ref 5–17)
ANION GAP SERPL CALC-SCNC: 15 MMOL/L — SIGNIFICANT CHANGE UP (ref 5–17)
ANION GAP SERPL CALC-SCNC: 16 MMOL/L — SIGNIFICANT CHANGE UP (ref 5–17)
ANION GAP SERPL CALC-SCNC: 18 MMOL/L — HIGH (ref 5–17)
ANION GAP SERPL CALC-SCNC: 18 MMOL/L — HIGH (ref 5–17)
BASE EXCESS BLDV CALC-SCNC: -5.4 MMOL/L — LOW (ref -2–2)
BUN SERPL-MCNC: 4 MG/DL — LOW (ref 7–23)
BUN SERPL-MCNC: 5 MG/DL — LOW (ref 7–23)
BUN SERPL-MCNC: 6 MG/DL — LOW (ref 7–23)
BUN SERPL-MCNC: 6 MG/DL — LOW (ref 7–23)
BUN SERPL-MCNC: 7 MG/DL — SIGNIFICANT CHANGE UP (ref 7–23)
BUN SERPL-MCNC: <4 MG/DL — LOW (ref 7–23)
CA-I SERPL-SCNC: 1.15 MMOL/L — SIGNIFICANT CHANGE UP (ref 1.12–1.3)
CALCIUM SERPL-MCNC: 7.9 MG/DL — LOW (ref 8.4–10.5)
CALCIUM SERPL-MCNC: 8 MG/DL — LOW (ref 8.4–10.5)
CALCIUM SERPL-MCNC: 8.1 MG/DL — LOW (ref 8.4–10.5)
CALCIUM SERPL-MCNC: 8.3 MG/DL — LOW (ref 8.4–10.5)
CALCIUM SERPL-MCNC: 8.3 MG/DL — LOW (ref 8.4–10.5)
CALCIUM SERPL-MCNC: 8.4 MG/DL — SIGNIFICANT CHANGE UP (ref 8.4–10.5)
CHLORIDE BLDV-SCNC: 112 MMOL/L — HIGH (ref 96–108)
CHLORIDE SERPL-SCNC: 106 MMOL/L — SIGNIFICANT CHANGE UP (ref 96–108)
CHLORIDE SERPL-SCNC: 106 MMOL/L — SIGNIFICANT CHANGE UP (ref 96–108)
CHLORIDE SERPL-SCNC: 107 MMOL/L — SIGNIFICANT CHANGE UP (ref 96–108)
CHLORIDE SERPL-SCNC: 108 MMOL/L — SIGNIFICANT CHANGE UP (ref 96–108)
CHLORIDE SERPL-SCNC: 108 MMOL/L — SIGNIFICANT CHANGE UP (ref 96–108)
CHLORIDE SERPL-SCNC: 109 MMOL/L — HIGH (ref 96–108)
CHOLEST SERPL-MCNC: 160 MG/DL — SIGNIFICANT CHANGE UP (ref 10–199)
CO2 BLDV-SCNC: 19 MMOL/L — LOW (ref 22–30)
CO2 SERPL-SCNC: 11 MMOL/L — LOW (ref 22–31)
CO2 SERPL-SCNC: 15 MMOL/L — LOW (ref 22–31)
CO2 SERPL-SCNC: 17 MMOL/L — LOW (ref 22–31)
CO2 SERPL-SCNC: 9 MMOL/L — CRITICAL LOW (ref 22–31)
CREAT SERPL-MCNC: 0.53 MG/DL — SIGNIFICANT CHANGE UP (ref 0.5–1.3)
CREAT SERPL-MCNC: 0.54 MG/DL — SIGNIFICANT CHANGE UP (ref 0.5–1.3)
CREAT SERPL-MCNC: 0.58 MG/DL — SIGNIFICANT CHANGE UP (ref 0.5–1.3)
CREAT SERPL-MCNC: 0.6 MG/DL — SIGNIFICANT CHANGE UP (ref 0.5–1.3)
CREAT SERPL-MCNC: 0.63 MG/DL — SIGNIFICANT CHANGE UP (ref 0.5–1.3)
CREAT SERPL-MCNC: 0.64 MG/DL — SIGNIFICANT CHANGE UP (ref 0.5–1.3)
CULTURE RESULTS: SIGNIFICANT CHANGE UP
GAS PNL BLDV: 134 MMOL/L — LOW (ref 135–145)
GAS PNL BLDV: SIGNIFICANT CHANGE UP
GLUCOSE BLDC GLUCOMTR-MCNC: 152 MG/DL — HIGH (ref 70–99)
GLUCOSE BLDC GLUCOMTR-MCNC: 153 MG/DL — HIGH (ref 70–99)
GLUCOSE BLDC GLUCOMTR-MCNC: 166 MG/DL — HIGH (ref 70–99)
GLUCOSE BLDC GLUCOMTR-MCNC: 168 MG/DL — HIGH (ref 70–99)
GLUCOSE BLDC GLUCOMTR-MCNC: 176 MG/DL — HIGH (ref 70–99)
GLUCOSE BLDC GLUCOMTR-MCNC: 178 MG/DL — HIGH (ref 70–99)
GLUCOSE BLDC GLUCOMTR-MCNC: 179 MG/DL — HIGH (ref 70–99)
GLUCOSE BLDC GLUCOMTR-MCNC: 182 MG/DL — HIGH (ref 70–99)
GLUCOSE BLDC GLUCOMTR-MCNC: 186 MG/DL — HIGH (ref 70–99)
GLUCOSE BLDC GLUCOMTR-MCNC: 187 MG/DL — HIGH (ref 70–99)
GLUCOSE BLDC GLUCOMTR-MCNC: 196 MG/DL — HIGH (ref 70–99)
GLUCOSE BLDC GLUCOMTR-MCNC: 197 MG/DL — HIGH (ref 70–99)
GLUCOSE BLDC GLUCOMTR-MCNC: 208 MG/DL — HIGH (ref 70–99)
GLUCOSE BLDC GLUCOMTR-MCNC: 238 MG/DL — HIGH (ref 70–99)
GLUCOSE BLDC GLUCOMTR-MCNC: 260 MG/DL — HIGH (ref 70–99)
GLUCOSE BLDC GLUCOMTR-MCNC: 262 MG/DL — HIGH (ref 70–99)
GLUCOSE BLDC GLUCOMTR-MCNC: 271 MG/DL — HIGH (ref 70–99)
GLUCOSE BLDC GLUCOMTR-MCNC: 292 MG/DL — HIGH (ref 70–99)
GLUCOSE BLDV-MCNC: 157 MG/DL — HIGH (ref 70–99)
GLUCOSE SERPL-MCNC: 167 MG/DL — HIGH (ref 70–99)
GLUCOSE SERPL-MCNC: 176 MG/DL — HIGH (ref 70–99)
GLUCOSE SERPL-MCNC: 190 MG/DL — HIGH (ref 70–99)
GLUCOSE SERPL-MCNC: 196 MG/DL — HIGH (ref 70–99)
GLUCOSE SERPL-MCNC: 255 MG/DL — HIGH (ref 70–99)
GLUCOSE SERPL-MCNC: 259 MG/DL — HIGH (ref 70–99)
HCO3 BLDV-SCNC: 18 MMOL/L — LOW (ref 21–29)
HCT VFR BLD CALC: 38.6 % — LOW (ref 39–50)
HCT VFR BLDA CALC: 42 % — SIGNIFICANT CHANGE UP (ref 39–50)
HDLC SERPL-MCNC: 23 MG/DL — LOW
HGB BLD CALC-MCNC: 13.5 G/DL — SIGNIFICANT CHANGE UP (ref 13–17)
HGB BLD-MCNC: 13.3 G/DL — SIGNIFICANT CHANGE UP (ref 13–17)
LACTATE BLDV-MCNC: 0.6 MMOL/L — LOW (ref 0.7–2)
LIPID PNL WITH DIRECT LDL SERPL: 77 MG/DL — SIGNIFICANT CHANGE UP
MAGNESIUM SERPL-MCNC: 1.8 MG/DL — SIGNIFICANT CHANGE UP (ref 1.6–2.6)
MAGNESIUM SERPL-MCNC: 2 MG/DL — SIGNIFICANT CHANGE UP (ref 1.6–2.6)
MAGNESIUM SERPL-MCNC: 2 MG/DL — SIGNIFICANT CHANGE UP (ref 1.6–2.6)
MAGNESIUM SERPL-MCNC: 2.1 MG/DL — SIGNIFICANT CHANGE UP (ref 1.6–2.6)
MAGNESIUM SERPL-MCNC: 2.1 MG/DL — SIGNIFICANT CHANGE UP (ref 1.6–2.6)
MCHC RBC-ENTMCNC: 30.1 PG — SIGNIFICANT CHANGE UP (ref 27–34)
MCHC RBC-ENTMCNC: 34.5 GM/DL — SIGNIFICANT CHANGE UP (ref 32–36)
MCV RBC AUTO: 87.2 FL — SIGNIFICANT CHANGE UP (ref 80–100)
OTHER CELLS CSF MANUAL: 18 ML/DL — SIGNIFICANT CHANGE UP (ref 18–22)
PCO2 BLDV: 31 MMHG — LOW (ref 35–50)
PH BLDV: 7.39 — SIGNIFICANT CHANGE UP (ref 7.35–7.45)
PHOSPHATE SERPL-MCNC: 1.7 MG/DL — LOW (ref 2.5–4.5)
PHOSPHATE SERPL-MCNC: 2.2 MG/DL — LOW (ref 2.5–4.5)
PHOSPHATE SERPL-MCNC: 2.3 MG/DL — LOW (ref 2.5–4.5)
PHOSPHATE SERPL-MCNC: 2.4 MG/DL — LOW (ref 2.5–4.5)
PHOSPHATE SERPL-MCNC: 2.8 MG/DL — SIGNIFICANT CHANGE UP (ref 2.5–4.5)
PLATELET # BLD AUTO: 153 K/UL — SIGNIFICANT CHANGE UP (ref 150–400)
PO2 BLDV: 71 MMHG — HIGH (ref 25–45)
POTASSIUM BLDV-SCNC: 3.1 MMOL/L — LOW (ref 3.5–5.3)
POTASSIUM SERPL-MCNC: 3.3 MMOL/L — LOW (ref 3.5–5.3)
POTASSIUM SERPL-MCNC: 3.4 MMOL/L — LOW (ref 3.5–5.3)
POTASSIUM SERPL-MCNC: 3.6 MMOL/L — SIGNIFICANT CHANGE UP (ref 3.5–5.3)
POTASSIUM SERPL-MCNC: 3.7 MMOL/L — SIGNIFICANT CHANGE UP (ref 3.5–5.3)
POTASSIUM SERPL-MCNC: 4 MMOL/L — SIGNIFICANT CHANGE UP (ref 3.5–5.3)
POTASSIUM SERPL-MCNC: 4.4 MMOL/L — SIGNIFICANT CHANGE UP (ref 3.5–5.3)
POTASSIUM SERPL-SCNC: 3.3 MMOL/L — LOW (ref 3.5–5.3)
POTASSIUM SERPL-SCNC: 3.4 MMOL/L — LOW (ref 3.5–5.3)
POTASSIUM SERPL-SCNC: 3.6 MMOL/L — SIGNIFICANT CHANGE UP (ref 3.5–5.3)
POTASSIUM SERPL-SCNC: 3.7 MMOL/L — SIGNIFICANT CHANGE UP (ref 3.5–5.3)
POTASSIUM SERPL-SCNC: 4 MMOL/L — SIGNIFICANT CHANGE UP (ref 3.5–5.3)
POTASSIUM SERPL-SCNC: 4.4 MMOL/L — SIGNIFICANT CHANGE UP (ref 3.5–5.3)
RBC # BLD: 4.42 M/UL — SIGNIFICANT CHANGE UP (ref 4.2–5.8)
RBC # FLD: 12.3 % — SIGNIFICANT CHANGE UP (ref 10.3–14.5)
SAO2 % BLDV: 96 % — HIGH (ref 67–88)
SODIUM SERPL-SCNC: 133 MMOL/L — LOW (ref 135–145)
SODIUM SERPL-SCNC: 135 MMOL/L — SIGNIFICANT CHANGE UP (ref 135–145)
SODIUM SERPL-SCNC: 136 MMOL/L — SIGNIFICANT CHANGE UP (ref 135–145)
SODIUM SERPL-SCNC: 139 MMOL/L — SIGNIFICANT CHANGE UP (ref 135–145)
SPECIMEN SOURCE: SIGNIFICANT CHANGE UP
TOTAL CHOLESTEROL/HDL RATIO MEASUREMENT: 7 RATIO — SIGNIFICANT CHANGE UP (ref 3.4–9.6)
TRIGL SERPL-MCNC: 298 MG/DL — HIGH (ref 10–149)
WBC # BLD: 6.9 K/UL — SIGNIFICANT CHANGE UP (ref 3.8–10.5)
WBC # FLD AUTO: 6.9 K/UL — SIGNIFICANT CHANGE UP (ref 3.8–10.5)

## 2019-10-01 PROCEDURE — 99255 IP/OBS CONSLTJ NEW/EST HI 80: CPT

## 2019-10-01 PROCEDURE — 76705 ECHO EXAM OF ABDOMEN: CPT | Mod: 26,RT

## 2019-10-01 PROCEDURE — 99233 SBSQ HOSP IP/OBS HIGH 50: CPT | Mod: GC

## 2019-10-01 RX ORDER — ACETAMINOPHEN 500 MG
650 TABLET ORAL ONCE
Refills: 0 | Status: COMPLETED | OUTPATIENT
Start: 2019-10-01 | End: 2019-10-01

## 2019-10-01 RX ORDER — MAGNESIUM SULFATE 500 MG/ML
2 VIAL (ML) INJECTION ONCE
Refills: 0 | Status: COMPLETED | OUTPATIENT
Start: 2019-10-01 | End: 2019-10-01

## 2019-10-01 RX ORDER — DEXTROSE MONOHYDRATE, SODIUM CHLORIDE, AND POTASSIUM CHLORIDE 50; .745; 4.5 G/1000ML; G/1000ML; G/1000ML
1000 INJECTION, SOLUTION INTRAVENOUS
Refills: 0 | Status: DISCONTINUED | OUTPATIENT
Start: 2019-10-01 | End: 2019-10-02

## 2019-10-01 RX ORDER — POTASSIUM PHOSPHATE, MONOBASIC POTASSIUM PHOSPHATE, DIBASIC 236; 224 MG/ML; MG/ML
30 INJECTION, SOLUTION INTRAVENOUS ONCE
Refills: 0 | Status: COMPLETED | OUTPATIENT
Start: 2019-10-01 | End: 2019-10-01

## 2019-10-01 RX ORDER — INSULIN HUMAN 100 [IU]/ML
5 INJECTION, SOLUTION SUBCUTANEOUS
Qty: 100 | Refills: 0 | Status: DISCONTINUED | OUTPATIENT
Start: 2019-10-01 | End: 2019-10-02

## 2019-10-01 RX ORDER — SODIUM CHLORIDE 9 MG/ML
1000 INJECTION, SOLUTION INTRAVENOUS
Refills: 0 | Status: DISCONTINUED | OUTPATIENT
Start: 2019-10-01 | End: 2019-10-01

## 2019-10-01 RX ORDER — POTASSIUM PHOSPHATE, MONOBASIC POTASSIUM PHOSPHATE, DIBASIC 236; 224 MG/ML; MG/ML
15 INJECTION, SOLUTION INTRAVENOUS ONCE
Refills: 0 | Status: COMPLETED | OUTPATIENT
Start: 2019-10-01 | End: 2019-10-01

## 2019-10-01 RX ORDER — POTASSIUM CHLORIDE 20 MEQ
10 PACKET (EA) ORAL
Refills: 0 | Status: COMPLETED | OUTPATIENT
Start: 2019-10-01 | End: 2019-10-01

## 2019-10-01 RX ORDER — POTASSIUM CHLORIDE 20 MEQ
40 PACKET (EA) ORAL ONCE
Refills: 0 | Status: COMPLETED | OUTPATIENT
Start: 2019-10-01 | End: 2019-10-01

## 2019-10-01 RX ORDER — SODIUM CHLORIDE 9 MG/ML
1000 INJECTION, SOLUTION INTRAVENOUS ONCE
Refills: 0 | Status: COMPLETED | OUTPATIENT
Start: 2019-10-01 | End: 2019-10-01

## 2019-10-01 RX ORDER — SODIUM,POTASSIUM PHOSPHATES 278-250MG
1 POWDER IN PACKET (EA) ORAL
Refills: 0 | Status: DISCONTINUED | OUTPATIENT
Start: 2019-10-01 | End: 2019-10-01

## 2019-10-01 RX ORDER — DEXTROSE MONOHYDRATE, SODIUM CHLORIDE, AND POTASSIUM CHLORIDE 50; .745; 4.5 G/1000ML; G/1000ML; G/1000ML
1000 INJECTION, SOLUTION INTRAVENOUS
Refills: 0 | Status: DISCONTINUED | OUTPATIENT
Start: 2019-10-01 | End: 2019-10-01

## 2019-10-01 RX ADMIN — Medication 40 MILLIEQUIVALENT(S): at 17:54

## 2019-10-01 RX ADMIN — Medication 40 MILLIEQUIVALENT(S): at 08:07

## 2019-10-01 RX ADMIN — POTASSIUM PHOSPHATE, MONOBASIC POTASSIUM PHOSPHATE, DIBASIC 62.5 MILLIMOLE(S): 236; 224 INJECTION, SOLUTION INTRAVENOUS at 12:41

## 2019-10-01 RX ADMIN — CHLORHEXIDINE GLUCONATE 1 APPLICATION(S): 213 SOLUTION TOPICAL at 06:18

## 2019-10-01 RX ADMIN — Medication 100 MILLIEQUIVALENT(S): at 04:43

## 2019-10-01 RX ADMIN — Medication 100 MILLIEQUIVALENT(S): at 19:45

## 2019-10-01 RX ADMIN — Medication 100 MILLIEQUIVALENT(S): at 01:42

## 2019-10-01 RX ADMIN — Medication 100 MILLIEQUIVALENT(S): at 21:03

## 2019-10-01 RX ADMIN — POTASSIUM PHOSPHATE, MONOBASIC POTASSIUM PHOSPHATE, DIBASIC 83.33 MILLIMOLE(S): 236; 224 INJECTION, SOLUTION INTRAVENOUS at 04:43

## 2019-10-01 RX ADMIN — Medication 50 GRAM(S): at 03:26

## 2019-10-01 RX ADMIN — SODIUM CHLORIDE 150 MILLILITER(S): 9 INJECTION, SOLUTION INTRAVENOUS at 06:18

## 2019-10-01 RX ADMIN — Medication 650 MILLIGRAM(S): at 14:30

## 2019-10-01 RX ADMIN — Medication 100 MILLIEQUIVALENT(S): at 22:26

## 2019-10-01 RX ADMIN — DEXTROSE MONOHYDRATE, SODIUM CHLORIDE, AND POTASSIUM CHLORIDE 100 MILLILITER(S): 50; .745; 4.5 INJECTION, SOLUTION INTRAVENOUS at 09:13

## 2019-10-01 RX ADMIN — Medication 100 MILLIEQUIVALENT(S): at 03:35

## 2019-10-01 RX ADMIN — SODIUM CHLORIDE 75 MILLILITER(S): 9 INJECTION, SOLUTION INTRAVENOUS at 08:07

## 2019-10-01 RX ADMIN — Medication 100 MILLIEQUIVALENT(S): at 06:13

## 2019-10-01 RX ADMIN — Medication 40 MILLIEQUIVALENT(S): at 03:25

## 2019-10-01 RX ADMIN — Medication 40 MILLIEQUIVALENT(S): at 19:55

## 2019-10-01 RX ADMIN — INSULIN HUMAN 8 UNIT(S)/HR: 100 INJECTION, SOLUTION SUBCUTANEOUS at 04:01

## 2019-10-01 RX ADMIN — Medication 100 MILLIEQUIVALENT(S): at 00:41

## 2019-10-01 RX ADMIN — INSULIN HUMAN 5 UNIT(S)/HR: 100 INJECTION, SOLUTION SUBCUTANEOUS at 20:00

## 2019-10-01 RX ADMIN — Medication 50 GRAM(S): at 08:07

## 2019-10-01 RX ADMIN — SODIUM CHLORIDE 500 MILLILITER(S): 9 INJECTION, SOLUTION INTRAVENOUS at 17:15

## 2019-10-01 RX ADMIN — Medication 650 MILLIGRAM(S): at 14:00

## 2019-10-01 NOTE — CONSULT NOTE ADULT - ATTENDING COMMENTS
Akosua Salgado (pager [phone])  On evenings and weekends, please call [phone] or page endocrine fellow on call.   Please note that this patient may be followed by different provider tomorrow. If no answer, contact endocrine fellow on call.

## 2019-10-01 NOTE — CONSULT NOTE ADULT - PROBLEM SELECTOR RECOMMENDATION 2
Patient will need follow up for weight management and possibel cosnideration of bartiatric surgery as outpt Patient will need follow up for weight management and possible consideration of bariatric surgery as outpt

## 2019-10-01 NOTE — CONSULT NOTE ADULT - SUBJECTIVE AND OBJECTIVE BOX
HPI:    Patient is a 33yom with pmh of morbid obesity (BMI 48.3) presents with abdominal pain for 2 weeks, increased polydypsia and polyuria for last few days (urinating up to 25 times a day). He was meeting for life insurance and had blood work performed with reported elevated lft. No known h/o diabetes. Reports intermittent abdominal discomfort in the lower abdomin and some pain in the RUQ. Pain is intermittent, may worsen with food and water (up to 7/10) and improves when patient is lying flat on the bed. Reports decreased appetite, weight loss, and decreased bowel movements (once every 3 days and his norm is once a day) with constipation. Patient denies vomiting and diarrhea. Has been feeling fatigued. no black or bloody stools. Denies chest pain.    In the ED, finger stick was 373. K+ 2.9, VBG showed pH 7.24, HCO3 8, AG 26, lactate 1.4. beta hydroxy butyrate is pending now. Patient was given 2L of LR and transferred to the MICU for DKA.     PAST MEDICAL & SURGICAL HISTORY:  obesity    FAMILY HISTORY:  One cousin had diabetes, mom had gyn cancer    HOME MEDICATIONS:  none    CAPILLARY BLOOD GLUCOSE: POCT Blood Glucose.: 306 mg/dL (30 Sep 2019 20:10)    LINES:     HOSPITAL MEDICATIONS:  MEDICATIONS  (STANDING):  chlorhexidine 4% Liquid 1 Application(s) Topical <User Schedule>  potassium chloride  10 mEq/100 mL IVPB 10 milliEquivalent(s) IV Intermittent every 1 hour    MEDICATIONS  (PRN):    MICROBIOLOGY: none    RADIOLOGY:  CXR: Prelim read - no emergent findings    EKG: (30 Sep 2019 20:54)      PAST MEDICAL & SURGICAL HISTORY:  Morbid obesity with body mass index (BMI) of 40.0 to 49.9  History of heartburn      FAMILY HISTORY:      Social History:    Outpatient Medications:    MEDICATIONS  (STANDING):  acetaminophen   Tablet .. 650 milliGRAM(s) Oral once  chlorhexidine 4% Liquid 1 Application(s) Topical <User Schedule>  influenza   Vaccine 0.5 milliLiter(s) IntraMuscular once  insulin regular Infusion 6 Unit(s)/Hr (6 mL/Hr) IV Continuous <Continuous>    MEDICATIONS  (PRN):      Allergies    No Known Allergies    Intolerances      Review of Systems:  Constitutional: No fever  Eyes: No blurry vision  Neuro: No tremors  HEENT: No pain  Cardiovascular: No chest pain, palpitations  Respiratory: No SOB, no cough  GI: No nausea, vomiting, abdominal pain  : No dysuria  Skin: no rash  Psych: no depression  Endocrine: no polyuria, polydipsia  Hem/lymph: no swelling  Osteoporosis: no fractures    ALL OTHER SYSTEMS REVIEWED AND NEGATIVE    UNABLE TO OBTAIN    PHYSICAL EXAM:  VITALS: T(C): 36.4 (10-01-19 @ 07:00)  T(F): 97.5 (10-01-19 @ 07:00), Max: 98.6 (10-01-19 @ 00:00)  HR: 85 (10-01-19 @ 13:00) (66 - 119)  BP: 110/58 (10-01-19 @ 13:00) (103/51 - 140/95)  RR:  (18 - 32)  SpO2:  (94% - 100%)  Wt(kg): --  GENERAL: NAD, well-groomed, well-developed  EYES: No proptosis, no lid lag, anicteric  HEENT:  Atraumatic, Normocephalic, moist mucous membranes  THYROID: Normal size, no palpable nodules  RESPIRATORY: Clear to auscultation bilaterally; No rales, rhonchi, wheezing, or rubs  CARDIOVASCULAR: Regular rate and rhythm; No murmurs; no peripheral edema  GI: Soft, nontender, non distended, normal bowel sounds  SKIN: Dry, intact, No rashes or lesions  MUSCULOSKELETAL: Full range of motion, normal strength  NEURO: sensation intact, extraocular movements intact, no tremor, normal reflexes  PSYCH: Alert and oriented x 3, normal affect, normal mood  CUSHING'S SIGNS: no striae    POCT Blood Glucose.: 187 mg/dL (10-01-19 @ 13:58)  POCT Blood Glucose.: 208 mg/dL (10-01-19 @ 12:47)  POCT Blood Glucose.: 262 mg/dL (10-01-19 @ 11:07)  POCT Blood Glucose.: 260 mg/dL (10-01-19 @ 09:59)  POCT Blood Glucose.: 271 mg/dL (10-01-19 @ 09:57)  POCT Blood Glucose.: 186 mg/dL (10-01-19 @ 09:02)  POCT Blood Glucose.: 182 mg/dL (10-01-19 @ 08:13)  POCT Blood Glucose.: 196 mg/dL (10-01-19 @ 06:04)  POCT Blood Glucose.: 238 mg/dL (10-01-19 @ 05:10)  POCT Blood Glucose.: 292 mg/dL (09-30-19 @ 21:10)  POCT Blood Glucose.: 306 mg/dL (09-30-19 @ 20:10)  POCT Blood Glucose.: 364 mg/dL (09-30-19 @ 17:52)  POCT Blood Glucose.: 373 mg/dL (09-30-19 @ 16:56)                            13.3   6.9   )-----------( 153      ( 01 Oct 2019 02:47 )             38.6       10-01    135  |  106  |  6<L>  ----------------------------<  255<H>  3.7   |  15<L>  |  0.64    EGFR if : 147  EGFR if non : 127    Ca    8.1<L>      10-01  Mg     2.1     10-01  Phos  2.4     10-01    TPro  7.5  /  Alb  4.6  /  TBili  0.4  /  DBili  x   /  AST  29  /  ALT  76<H>  /  AlkPhos  79  09-30      Thyroid Function Tests:      Hemoglobin A1C, Whole Blood: 11.3 % <H> [4.0 - 5.6] (09-30-19 @ 23:35)      10-01 Chol 160 LDL 77 HDL 23<L> Trig 298<H>    Radiology: HPI:    Patient is a 33yom with pmh of morbid obesity (BMI 48.3) presents with abdominal pain for 2 weeks, increased polydypsia and polyuria for last few days (urinating up to 25 times a day). He was meeting for life insurance and had blood work performed with reported elevated lft. No known h/o diabetes. Reports intermittent abdominal discomfort in the lower abdomin and some pain in the RUQ. Pain is intermittent, may worsen with food and water (up to 7/10) and improves when patient is lying flat on the bed. Reports decreased appetite, weight loss, and decreased bowel movements (once every 3 days and his norm is once a day) with constipation. Patient denies vomiting and diarrhea. Has been feeling fatigued. no black or bloody stools. Denies chest pain.    In the ED, finger stick was 373. K+ 2.9, VBG showed pH 7.24, HCO3 8, AG 26, lactate 1.4. beta hydroxy butyrate is pending now. Patient was given 2L of LR and transferred to the MICU for DKA.       Endocrine History: 33 yr M with morbid obesity here with abdmonial pain, polydipsia and polyuria foudn to be in DKA with Glucose 300s BHB >8.0 HCO3 8 and AG 26. Patient is newly diagnosed with diabetes. Has a cousin with Type 1 DM who lives in [place]. Patient has never been on any medications for diabetes. He works in [employer] and has an erratic eatign echedule. Has large poriton sizes and mostly eats take-out. Has not had screenign for any compalitons of diabet. Has attempted diet chanegs for weight loss but unsuccesflu. Does to exercise.       PAST MEDICAL & SURGICAL HISTORY:  Morbid obesity with body mass index (BMI) of 40.0 to 49.9  History of heartburn      FAMILY HISTORY:  Cousin: Type 1 DM      Social History: Originally from [place], works in [employer]    Outpatient Medications: None    MEDICATIONS  (STANDING):  acetaminophen   Tablet .. 650 milliGRAM(s) Oral once  chlorhexidine 4% Liquid 1 Application(s) Topical <User Schedule>  influenza   Vaccine 0.5 milliLiter(s) IntraMuscular once  insulin regular Infusion 6 Unit(s)/Hr (6 mL/Hr) IV Continuous <Continuous>    MEDICATIONS  (PRN):      Allergies    No Known Allergies    Intolerances      Review of Systems:  Constitutional: No fever  Eyes: No blurry vision  Neuro: No tremors  HEENT: No pain  Cardiovascular: No chest pain, palpitations  Respiratory: No SOB, no cough  GI: +nausea, vomiting, abdominal pain on arrival  : No dysuria  Skin: no rash  Psych: no depression  Endocrine: no polyuria, polydipsia  Hem/lymph: no swelling  Osteoporosis: no fractures    ALL OTHER SYSTEMS REVIEWED AND NEGATIVE        PHYSICAL EXAM:  VITALS: T(C): 36.4 (10-01-19 @ 07:00)  T(F): 97.5 (10-01-19 @ 07:00), Max: 98.6 (10-01-19 @ 00:00)  HR: 85 (10-01-19 @ 13:00) (66 - 119)  BP: 110/58 (10-01-19 @ 13:00) (103/51 - 140/95)  RR:  (18 - 32)  SpO2:  (94% - 100%)  Wt(kg): --  GENERAL: NAD, morbidly obese  EYES: No proptosis, no lid lag, anicteric  HEENT:  Atraumatic, Normocephalic, moist mucous membranes  THYROID: Normal size, no palpable nodules  RESPIRATORY: Clear to auscultation bilaterally; No rales, rhonchi, wheezing, or rubs  CARDIOVASCULAR: Regular rate and rhythm; No murmurs; no peripheral edema  GI: Soft, nontender, non distended, normal bowel sounds  SKIN: Dry, intact, No rashes or lesions  PSYCH: Alert and oriented x 3, normal affect, normal mood      POCT Blood Glucose.: 187 mg/dL (10-01-19 @ 13:58)  POCT Blood Glucose.: 208 mg/dL (10-01-19 @ 12:47)  POCT Blood Glucose.: 262 mg/dL (10-01-19 @ 11:07)  POCT Blood Glucose.: 260 mg/dL (10-01-19 @ 09:59)  POCT Blood Glucose.: 271 mg/dL (10-01-19 @ 09:57)  POCT Blood Glucose.: 186 mg/dL (10-01-19 @ 09:02)  POCT Blood Glucose.: 182 mg/dL (10-01-19 @ 08:13)  POCT Blood Glucose.: 196 mg/dL (10-01-19 @ 06:04)  POCT Blood Glucose.: 238 mg/dL (10-01-19 @ 05:10)  POCT Blood Glucose.: 292 mg/dL (09-30-19 @ 21:10)  POCT Blood Glucose.: 306 mg/dL (09-30-19 @ 20:10)  POCT Blood Glucose.: 364 mg/dL (09-30-19 @ 17:52)  POCT Blood Glucose.: 373 mg/dL (09-30-19 @ 16:56)                            13.3   6.9   )-----------( 153      ( 01 Oct 2019 02:47 )             38.6       10-01    135  |  106  |  6<L>  ----------------------------<  255<H>  3.7   |  15<L>  |  0.64    EGFR if : 147  EGFR if non : 127    Ca    8.1<L>      10-01  Mg     2.1     10-01  Phos  2.4     10-01    TPro  7.5  /  Alb  4.6  /  TBili  0.4  /  DBili  x   /  AST  29  /  ALT  76<H>  /  AlkPhos  79  09-30          Hemoglobin A1C, Whole Blood: 11.3 % <H> [4.0 - 5.6] (09-30-19 @ 23:35)      10-01 Chol 160 LDL 77 HDL 23<L> Trig 298<H> HPI:    Patient is a 33yom with pmh of morbid obesity (BMI 48.3) presents with abdominal pain for 2 weeks, increased polydypsia and polyuria for last few days (urinating up to 25 times a day). He was meeting for life insurance and had blood work performed with reported elevated lft. No known h/o diabetes. Reports intermittent abdominal discomfort in the lower abdomin and some pain in the RUQ. Pain is intermittent, may worsen with food and water (up to 7/10) and improves when patient is lying flat on the bed. Reports decreased appetite, weight loss, and decreased bowel movements (once every 3 days and his norm is once a day) with constipation. Patient denies vomiting and diarrhea. Has been feeling fatigued. no black or bloody stools. Denies chest pain.    In the ED, finger stick was 373. K+ 2.9, VBG showed pH 7.24, HCO3 8, AG 26, lactate 1.4. beta hydroxy butyrate is pending now. Patient was given 2L of LR and transferred to the MICU for DKA.       Endocrine History: 33 yr M with morbid obesity here with abdominal pain, polydipsia and polyuria found to be in DKA with Glucose 300s BHB >8.0 HCO3 8 and AG 26. Patient is newly diagnosed with diabetes. Has a cousin with Type 1 DM who lives in [facility]. Patient has never been on any medications for diabetes. He works in [employer] and has an erratic eating schedule. Has large portion sizes and mostly eats take-out. Has not had screening for any complications of diabetes. Has attempted diet changes for weight loss but unsuccessful. Does not exercise.       PAST MEDICAL & SURGICAL HISTORY:  Morbid obesity with body mass index (BMI) of 40.0 to 49.9  History of heartburn      FAMILY HISTORY:  Cousin: Type 1 DM      Social History: Originally from [facility], works in [employer]    Outpatient Medications: None    MEDICATIONS  (STANDING):  acetaminophen   Tablet .. 650 milliGRAM(s) Oral once  chlorhexidine 4% Liquid 1 Application(s) Topical <User Schedule>  influenza   Vaccine 0.5 milliLiter(s) IntraMuscular once  insulin regular Infusion 6 Unit(s)/Hr (6 mL/Hr) IV Continuous <Continuous>    MEDICATIONS  (PRN):      Allergies    No Known Allergies    Intolerances      Review of Systems:  Constitutional: No fever  Eyes: No blurry vision  Neuro: No tremors  HEENT: No pain  Cardiovascular: No chest pain, palpitations  Respiratory: No SOB, no cough  GI: +nausea, vomiting, abdominal pain on arrival  : No dysuria  Skin: no rash  Psych: no depression  Endocrine: no polyuria, polydipsia  Hem/lymph: no swelling  Osteoporosis: no fractures    ALL OTHER SYSTEMS REVIEWED AND NEGATIVE        PHYSICAL EXAM:  VITALS: T(C): 36.4 (10-01-19 @ 07:00)  T(F): 97.5 (10-01-19 @ 07:00), Max: 98.6 (10-01-19 @ 00:00)  HR: 85 (10-01-19 @ 13:00) (66 - 119)  BP: 110/58 (10-01-19 @ 13:00) (103/51 - 140/95)  RR:  (18 - 32)  SpO2:  (94% - 100%)  Wt(kg): --  GENERAL: NAD, morbidly obese  EYES: No proptosis, no lid lag, anicteric  HEENT:  Atraumatic, Normocephalic, moist mucous membranes  THYROID: Normal size, no palpable nodules  RESPIRATORY: Clear to auscultation bilaterally; No rales, rhonchi, wheezing, or rubs  CARDIOVASCULAR: Regular rate and rhythm; No murmurs; no peripheral edema  GI: Soft, nontender, non distended, normal bowel sounds  SKIN: Dry, intact, No rashes or lesions  PSYCH: Alert and oriented x 3, normal affect, normal mood      POCT Blood Glucose.: 187 mg/dL (10-01-19 @ 13:58)  POCT Blood Glucose.: 208 mg/dL (10-01-19 @ 12:47)  POCT Blood Glucose.: 262 mg/dL (10-01-19 @ 11:07)  POCT Blood Glucose.: 260 mg/dL (10-01-19 @ 09:59)  POCT Blood Glucose.: 271 mg/dL (10-01-19 @ 09:57)  POCT Blood Glucose.: 186 mg/dL (10-01-19 @ 09:02)  POCT Blood Glucose.: 182 mg/dL (10-01-19 @ 08:13)  POCT Blood Glucose.: 196 mg/dL (10-01-19 @ 06:04)  POCT Blood Glucose.: 238 mg/dL (10-01-19 @ 05:10)  POCT Blood Glucose.: 292 mg/dL (09-30-19 @ 21:10)  POCT Blood Glucose.: 306 mg/dL (09-30-19 @ 20:10)  POCT Blood Glucose.: 364 mg/dL (09-30-19 @ 17:52)  POCT Blood Glucose.: 373 mg/dL (09-30-19 @ 16:56)                            13.3   6.9   )-----------( 153      ( 01 Oct 2019 02:47 )             38.6       10-01    135  |  106  |  6<L>  ----------------------------<  255<H>  3.7   |  15<L>  |  0.64    EGFR if : 147  EGFR if non : 127    Ca    8.1<L>      10-01  Mg     2.1     10-01  Phos  2.4     10-01    TPro  7.5  /  Alb  4.6  /  TBili  0.4  /  DBili  x   /  AST  29  /  ALT  76<H>  /  AlkPhos  79  09-30          Hemoglobin A1C, Whole Blood: 11.3 % <H> [4.0 - 5.6] (09-30-19 @ 23:35)      10-01 Chol 160 LDL 77 HDL 23<L> Trig 298<H>

## 2019-10-01 NOTE — PROGRESS NOTE ADULT - SUBJECTIVE AND OBJECTIVE BOX
HPI:  33yom with of morbid obesity (BMI 48.3) presents with abd pain for 2 wks, increased polydypsia and polyuria.Reports intermittent abdominal discomfort in the lower abdomin and some pain in the RUQ. Pain is intermittent, may worsen with food and water (up to 7/10) and improves when patient is lying flat on the bed. Reports decreased appetite, weight loss, and decreased bowel movements (once every 3 days and his norm is once a day) with constipation. Patient denies vomiting and diarrhea. Has been feeling fatigued. no black or bloody stools. Denies chest pain.  In the ED, finger stick was 373. K+ 2.9, VBG showed pH 7.24, HCO3 8, AG 26, lactate 1.4. beta hydroxy butyrate is pending now. Patient was given 2L of LR and transferred to the MICU for DKA    Interval Events:  Overnight, patient's K+ was repleted and patient was started on an insulin gtt at 8u. Blood sugars came down <200 and he was decreased to 5u/hr and switched to D5+NS at 150cc/hr.      REVIEW OF SYSTEMS:  Constitutional: [x] negative [ ] fevers [ ] chills [ ] weight loss [ ] weight gain  HEENT: [x] negative [ ] dry eyes [ ] eye irritation [ ] postnasal drip [ ] nasal congestion  CV: [x] negative  [ ] chest pain [ ] orthopnea [ ] palpitations [ ] murmur  Resp: [x] negative [ ] cough [ ] shortness of breath [ ] dyspnea [ ] wheezing [ ] sputum [ ] hemoptysis  GI: [x] negative [ ] nausea [ ] vomiting [ ] diarrhea [ ] constipation [ ] abd pain [ ] dysphagia   : [x] negative [ ] dysuria [ ] nocturia [ ] hematuria [ ] increased urinary frequency  Musculoskeletal: [x] negative [ ] back pain [ ] myalgias [ ] arthralgias [ ] fracture  Skin: [x] negative [ ] rash [ ] itch  Neurological: [x] negative [ ] headache [ ] dizziness [ ] syncope [ ] weakness [ ] numbness  Psychiatric: [x] negative [ ] anxiety [ ] depression  Endocrine: [x] negative [ ] diabetes [ ] thyroid problem  Hematologic/Lymphatic: [x] negative [ ] anemia [ ] bleeding problem  Allergic/Immunologic: [x] negative [ ] itchy eyes [ ] nasal discharge [ ] hives [ ] angioedema  [x] All other systems negative  [ ] Unable to assess ROS because ________    OBJECTIVE:  ICU Vital Signs Last 24 Hrs  T(C): 36.7 (01 Oct 2019 04:00), Max: 37 (01 Oct 2019 00:00)  T(F): 98 (01 Oct 2019 04:00), Max: 98.6 (01 Oct 2019 00:00)  HR: 80 (01 Oct 2019 06:00) (66 - 119)  BP: 109/51 (01 Oct 2019 06:00) (103/51 - 140/95)  BP(mean): 73 (01 Oct 2019 06:00) (73 - 94)  ABP: --  ABP(mean): --  RR: 23 (01 Oct 2019 06:00) (18 - 32)  SpO2: 94% (01 Oct 2019 06:00) (94% - 100%)        09-30 @ 07:01  -  10-01 @ 06:34  --------------------------------------------------------  IN: 4620.9 mL / OUT: 2450 mL / NET: 2170.9 mL      CAPILLARY BLOOD GLUCOSE      POCT Blood Glucose.: 196 mg/dL (01 Oct 2019 06:04)    Physical Exam  General: NAD, pleasant and conversant  HEENT: PERRLA, normal ocular movements  Lymph Nodes: no lymphadenopathy present  Neck: supple neck, no thyromegaly  Respiratory: lungs clear to auscultation bilaterally, no rales, no wheezing, no rhonchi  Cardiovascular: Normal S1, S2, RRR, no murmurs, rubs or gallops  Abdomen: soft, nontender, non-distended, bowel sounds present  Extremities: no deformities, no lower extremity edema  Skin: intact  Neurological: A&o x4  Psychiatry: pleasant mood, normal affect    LINES:    HOSPITAL MEDICATIONS:  Standing Meds:  chlorhexidine 4% Liquid 1 Application(s) Topical <User Schedule>  dextrose 5% + sodium chloride 0.9%. 1000 milliLiter(s) IV Continuous <Continuous>  influenza   Vaccine 0.5 milliLiter(s) IntraMuscular once  insulin regular Infusion 5 Unit(s)/Hr IV Continuous <Continuous>      PRN Meds:      LABS:                        13.3   6.9   )-----------( 153      ( 01 Oct 2019 02:47 )             38.6     Hgb Trend: 13.3<--, 14.3<--, 16.2<--  10    133<L>  |  106  |  7   ----------------------------<  259<H>  4.4   |  9<LL>  |  0.58    Ca    7.9<L>      01 Oct 2019 02:47  Phos  2.2     10-01  Mg     1.8     10-01    TPro  7.5  /  Alb  4.6  /  TBili  0.4  /  DBili  x   /  AST  29  /  ALT  76<H>  /  AlkPhos  79      Creatinine Trend: 0.58<--, 0.69<--, 0.78<--    Urinalysis Basic - ( 30 Sep 2019 18:58 )    Color: Light Yellow / Appearance: Clear / S.032 / pH: x  Gluc: x / Ketone: Large  / Bili: Negative / Urobili: Negative   Blood: x / Protein: 30 mg/dL / Nitrite: Negative   Leuk Esterase: Negative / RBC: 3 /hpf / WBC 1 /HPF   Sq Epi: x / Non Sq Epi: 1 / Bacteria: Negative        Venous Blood Gas:  10-01 @ 02:41  7.29/28/47//82  VBG Lactate: 1.0  Venous Blood Gas:   @ 22:43  7.26/30///50  VBG Lactate: 1.0  Venous Blood Gas:   @ 18:45  7.24//38//69  VBG Lactate: 1.4      MICROBIOLOGY:     RADIOLOGY:  [ ] Reviewed and interpreted by me    EKG:

## 2019-10-01 NOTE — CONSULT NOTE ADULT - PROBLEM SELECTOR PROBLEM 2
Class 3 severe obesity with body mass index (BMI) of 45.0 to 49.9 in adult, unspecified obesity type, unspecified whether serious comorbidity present

## 2019-10-01 NOTE — CONSULT NOTE ADULT - PROBLEM SELECTOR RECOMMENDATION 9
Patient is being treated in MICU as per DKA protocol.  Can transition to basal/bolus once glucose <200 HCO3 >18 and AG <12  Goal glucose 100-180  Would give Lantus 40 Units and overlap insulin gtt by 2 hours prior to discontiuation  Please call endocrine if glucose levels are not controlled on Lantus 40 Units as patient may need BID Lantus dosing  Once AG closed can start CHO diet and start Humalog 13 Unist TIDAC plus moderate scale before melas and low scale at bedtime  Given finding of DKA and family histoyr of Type 1 DM, would check anti Islet cell and HAM Ab  Patient will be discahrged on Novolin 70/30 vial and syringe given lack of insurance coverage  Please contact endocrine for final dosing receommnedations priro to discahrge  Patient will need gluocmetr teaching, diabetes eduaiton and insulin injeciton teaching  Nutrion consult  Counselled on hypogcelyami magement  Please send scripts for glucometer, test strips, lancets, alcohol swabs, insulin vials, syringes and needles to patient's pharmacy prior to discharge.  Patient can follow up at [facility] endocrine clinic [phone]  Discussed with team Patient is being treated in MICU as per DKA protocol.  Can transition to basal/bolus once glucose <200 HCO3 >18 and AG <12  Goal glucose 100-180  Would give Lantus 40 Units and overlap insulin gtt by 2 hours prior to discontinuation  Please call endocrine if glucose levels are not controlled on Lantus 40 Units as patient may need BID Lantus dosing  Once AG closed can start CHO diet and start Humalog 13 Units TIDAC plus moderate scale before melas and low scale at bedtime  Given finding of DKA and family history of Type 1 DM, would check anti Islet cell and HAM Ab  Patient will be discharged on Novolin 70/30 vial and syringe given lack of insurance coverage  Please contact endocrine for final dosing recommendations prior to discharge  Patient will need glucometer teaching, diabetes education and insulin injection teaching  Nutrition consult  Counselled on hypoglycemia management.  Please send scripts for glucometer, test strips, lancets, alcohol swabs, insulin vials, syringes and needles to patient's pharmacy prior to discharge.  Patient can follow up at [facility] endocrine clinic [phone]  Discussed with team

## 2019-10-01 NOTE — CONSULT NOTE ADULT - ASSESSMENT
33 yr M with morbid obesity here with DKA and newly diagnosed with diabetes (majorey Type 2 given morbid obesity). 33 yr M with morbid obesity here with DKA and newly diagnosed with diabetes (likely Type 2 given morbid obesity).

## 2019-10-02 LAB
ALBUMIN SERPL ELPH-MCNC: 3.4 G/DL — SIGNIFICANT CHANGE UP (ref 3.3–5)
ALBUMIN SERPL ELPH-MCNC: 3.7 G/DL — SIGNIFICANT CHANGE UP (ref 3.3–5)
ALP SERPL-CCNC: 58 U/L — SIGNIFICANT CHANGE UP (ref 40–120)
ALP SERPL-CCNC: 66 U/L — SIGNIFICANT CHANGE UP (ref 40–120)
ALT FLD-CCNC: 62 U/L — HIGH (ref 10–45)
ALT FLD-CCNC: 72 U/L — HIGH (ref 10–45)
ANION GAP SERPL CALC-SCNC: 14 MMOL/L — SIGNIFICANT CHANGE UP (ref 5–17)
ANION GAP SERPL CALC-SCNC: 16 MMOL/L — SIGNIFICANT CHANGE UP (ref 5–17)
ANION GAP SERPL CALC-SCNC: 16 MMOL/L — SIGNIFICANT CHANGE UP (ref 5–17)
ANION GAP SERPL CALC-SCNC: 17 MMOL/L — SIGNIFICANT CHANGE UP (ref 5–17)
ANION GAP SERPL CALC-SCNC: 21 MMOL/L — HIGH (ref 5–17)
AST SERPL-CCNC: 33 U/L — SIGNIFICANT CHANGE UP (ref 10–40)
AST SERPL-CCNC: 41 U/L — HIGH (ref 10–40)
BILIRUB SERPL-MCNC: 0.5 MG/DL — SIGNIFICANT CHANGE UP (ref 0.2–1.2)
BILIRUB SERPL-MCNC: 0.7 MG/DL — SIGNIFICANT CHANGE UP (ref 0.2–1.2)
BUN SERPL-MCNC: 5 MG/DL — LOW (ref 7–23)
BUN SERPL-MCNC: <4 MG/DL — LOW (ref 7–23)
CALCIUM SERPL-MCNC: 8.3 MG/DL — LOW (ref 8.4–10.5)
CALCIUM SERPL-MCNC: 8.4 MG/DL — SIGNIFICANT CHANGE UP (ref 8.4–10.5)
CALCIUM SERPL-MCNC: 8.8 MG/DL — SIGNIFICANT CHANGE UP (ref 8.4–10.5)
CALCIUM SERPL-MCNC: 8.9 MG/DL — SIGNIFICANT CHANGE UP (ref 8.4–10.5)
CALCIUM SERPL-MCNC: 9.1 MG/DL — SIGNIFICANT CHANGE UP (ref 8.4–10.5)
CHLORIDE SERPL-SCNC: 104 MMOL/L — SIGNIFICANT CHANGE UP (ref 96–108)
CHLORIDE SERPL-SCNC: 105 MMOL/L — SIGNIFICANT CHANGE UP (ref 96–108)
CHLORIDE SERPL-SCNC: 105 MMOL/L — SIGNIFICANT CHANGE UP (ref 96–108)
CHLORIDE SERPL-SCNC: 106 MMOL/L — SIGNIFICANT CHANGE UP (ref 96–108)
CHLORIDE SERPL-SCNC: 106 MMOL/L — SIGNIFICANT CHANGE UP (ref 96–108)
CO2 SERPL-SCNC: 14 MMOL/L — LOW (ref 22–31)
CO2 SERPL-SCNC: 15 MMOL/L — LOW (ref 22–31)
CO2 SERPL-SCNC: 18 MMOL/L — LOW (ref 22–31)
CREAT SERPL-MCNC: 0.52 MG/DL — SIGNIFICANT CHANGE UP (ref 0.5–1.3)
CREAT SERPL-MCNC: 0.56 MG/DL — SIGNIFICANT CHANGE UP (ref 0.5–1.3)
CREAT SERPL-MCNC: 0.56 MG/DL — SIGNIFICANT CHANGE UP (ref 0.5–1.3)
CREAT SERPL-MCNC: 0.6 MG/DL — SIGNIFICANT CHANGE UP (ref 0.5–1.3)
CREAT SERPL-MCNC: 0.68 MG/DL — SIGNIFICANT CHANGE UP (ref 0.5–1.3)
GLUCOSE BLDC GLUCOMTR-MCNC: 186 MG/DL — HIGH (ref 70–99)
GLUCOSE BLDC GLUCOMTR-MCNC: 197 MG/DL — HIGH (ref 70–99)
GLUCOSE BLDC GLUCOMTR-MCNC: 197 MG/DL — HIGH (ref 70–99)
GLUCOSE BLDC GLUCOMTR-MCNC: 207 MG/DL — HIGH (ref 70–99)
GLUCOSE BLDC GLUCOMTR-MCNC: 213 MG/DL — HIGH (ref 70–99)
GLUCOSE BLDC GLUCOMTR-MCNC: 218 MG/DL — HIGH (ref 70–99)
GLUCOSE BLDC GLUCOMTR-MCNC: 228 MG/DL — HIGH (ref 70–99)
GLUCOSE BLDC GLUCOMTR-MCNC: 236 MG/DL — HIGH (ref 70–99)
GLUCOSE BLDC GLUCOMTR-MCNC: 241 MG/DL — HIGH (ref 70–99)
GLUCOSE BLDC GLUCOMTR-MCNC: 246 MG/DL — HIGH (ref 70–99)
GLUCOSE BLDC GLUCOMTR-MCNC: 258 MG/DL — HIGH (ref 70–99)
GLUCOSE BLDC GLUCOMTR-MCNC: 262 MG/DL — HIGH (ref 70–99)
GLUCOSE SERPL-MCNC: 210 MG/DL — HIGH (ref 70–99)
GLUCOSE SERPL-MCNC: 247 MG/DL — HIGH (ref 70–99)
GLUCOSE SERPL-MCNC: 271 MG/DL — HIGH (ref 70–99)
GLUCOSE SERPL-MCNC: 285 MG/DL — HIGH (ref 70–99)
GLUCOSE SERPL-MCNC: 287 MG/DL — HIGH (ref 70–99)
HCT VFR BLD CALC: 38.2 % — LOW (ref 39–50)
HGB BLD-MCNC: 13.8 G/DL — SIGNIFICANT CHANGE UP (ref 13–17)
MAGNESIUM SERPL-MCNC: 1.7 MG/DL — SIGNIFICANT CHANGE UP (ref 1.6–2.6)
MAGNESIUM SERPL-MCNC: 1.7 MG/DL — SIGNIFICANT CHANGE UP (ref 1.6–2.6)
MAGNESIUM SERPL-MCNC: 1.9 MG/DL — SIGNIFICANT CHANGE UP (ref 1.6–2.6)
MCHC RBC-ENTMCNC: 30.1 PG — SIGNIFICANT CHANGE UP (ref 27–34)
MCHC RBC-ENTMCNC: 36.1 GM/DL — HIGH (ref 32–36)
MCV RBC AUTO: 83.4 FL — SIGNIFICANT CHANGE UP (ref 80–100)
NRBC # BLD: 0 /100 WBCS — SIGNIFICANT CHANGE UP (ref 0–0)
PHOSPHATE SERPL-MCNC: 2.2 MG/DL — LOW (ref 2.5–4.5)
PHOSPHATE SERPL-MCNC: 2.4 MG/DL — LOW (ref 2.5–4.5)
PHOSPHATE SERPL-MCNC: 2.7 MG/DL — SIGNIFICANT CHANGE UP (ref 2.5–4.5)
PHOSPHATE SERPL-MCNC: 2.8 MG/DL — SIGNIFICANT CHANGE UP (ref 2.5–4.5)
PLATELET # BLD AUTO: 147 K/UL — LOW (ref 150–400)
POTASSIUM SERPL-MCNC: 3.4 MMOL/L — LOW (ref 3.5–5.3)
POTASSIUM SERPL-MCNC: 3.6 MMOL/L — SIGNIFICANT CHANGE UP (ref 3.5–5.3)
POTASSIUM SERPL-MCNC: 3.7 MMOL/L — SIGNIFICANT CHANGE UP (ref 3.5–5.3)
POTASSIUM SERPL-MCNC: 3.7 MMOL/L — SIGNIFICANT CHANGE UP (ref 3.5–5.3)
POTASSIUM SERPL-MCNC: 4.3 MMOL/L — SIGNIFICANT CHANGE UP (ref 3.5–5.3)
POTASSIUM SERPL-SCNC: 3.4 MMOL/L — LOW (ref 3.5–5.3)
POTASSIUM SERPL-SCNC: 3.6 MMOL/L — SIGNIFICANT CHANGE UP (ref 3.5–5.3)
POTASSIUM SERPL-SCNC: 3.7 MMOL/L — SIGNIFICANT CHANGE UP (ref 3.5–5.3)
POTASSIUM SERPL-SCNC: 3.7 MMOL/L — SIGNIFICANT CHANGE UP (ref 3.5–5.3)
POTASSIUM SERPL-SCNC: 4.3 MMOL/L — SIGNIFICANT CHANGE UP (ref 3.5–5.3)
PROT SERPL-MCNC: 5.8 G/DL — LOW (ref 6–8.3)
PROT SERPL-MCNC: 6.2 G/DL — SIGNIFICANT CHANGE UP (ref 6–8.3)
RBC # BLD: 4.58 M/UL — SIGNIFICANT CHANGE UP (ref 4.2–5.8)
RBC # FLD: 13.3 % — SIGNIFICANT CHANGE UP (ref 10.3–14.5)
SODIUM SERPL-SCNC: 137 MMOL/L — SIGNIFICANT CHANGE UP (ref 135–145)
SODIUM SERPL-SCNC: 138 MMOL/L — SIGNIFICANT CHANGE UP (ref 135–145)
SODIUM SERPL-SCNC: 139 MMOL/L — SIGNIFICANT CHANGE UP (ref 135–145)
SODIUM SERPL-SCNC: 139 MMOL/L — SIGNIFICANT CHANGE UP (ref 135–145)
SODIUM SERPL-SCNC: 140 MMOL/L — SIGNIFICANT CHANGE UP (ref 135–145)
WBC # BLD: 5.92 K/UL — SIGNIFICANT CHANGE UP (ref 3.8–10.5)
WBC # FLD AUTO: 5.92 K/UL — SIGNIFICANT CHANGE UP (ref 3.8–10.5)

## 2019-10-02 PROCEDURE — 99232 SBSQ HOSP IP/OBS MODERATE 35: CPT

## 2019-10-02 PROCEDURE — 99233 SBSQ HOSP IP/OBS HIGH 50: CPT | Mod: GC

## 2019-10-02 RX ORDER — INSULIN GLARGINE 100 [IU]/ML
25 INJECTION, SOLUTION SUBCUTANEOUS ONCE
Refills: 0 | Status: COMPLETED | OUTPATIENT
Start: 2019-10-02 | End: 2019-10-02

## 2019-10-02 RX ORDER — DEXTROSE 50 % IN WATER 50 %
12.5 SYRINGE (ML) INTRAVENOUS ONCE
Refills: 0 | Status: DISCONTINUED | OUTPATIENT
Start: 2019-10-02 | End: 2019-10-04

## 2019-10-02 RX ORDER — GLUCAGON INJECTION, SOLUTION 0.5 MG/.1ML
1 INJECTION, SOLUTION SUBCUTANEOUS ONCE
Refills: 0 | Status: DISCONTINUED | OUTPATIENT
Start: 2019-10-02 | End: 2019-10-04

## 2019-10-02 RX ORDER — INSULIN LISPRO 100/ML
13 VIAL (ML) SUBCUTANEOUS
Refills: 0 | Status: DISCONTINUED | OUTPATIENT
Start: 2019-10-02 | End: 2019-10-02

## 2019-10-02 RX ORDER — INSULIN GLARGINE 100 [IU]/ML
15 INJECTION, SOLUTION SUBCUTANEOUS ONCE
Refills: 0 | Status: COMPLETED | OUTPATIENT
Start: 2019-10-02 | End: 2019-10-02

## 2019-10-02 RX ORDER — POTASSIUM PHOSPHATE, MONOBASIC POTASSIUM PHOSPHATE, DIBASIC 236; 224 MG/ML; MG/ML
15 INJECTION, SOLUTION INTRAVENOUS ONCE
Refills: 0 | Status: DISCONTINUED | OUTPATIENT
Start: 2019-10-02 | End: 2019-10-02

## 2019-10-02 RX ORDER — POTASSIUM CHLORIDE 20 MEQ
20 PACKET (EA) ORAL DAILY
Refills: 0 | Status: DISCONTINUED | OUTPATIENT
Start: 2019-10-02 | End: 2019-10-04

## 2019-10-02 RX ORDER — INSULIN LISPRO 100/ML
VIAL (ML) SUBCUTANEOUS
Refills: 0 | Status: DISCONTINUED | OUTPATIENT
Start: 2019-10-02 | End: 2019-10-04

## 2019-10-02 RX ORDER — POTASSIUM CHLORIDE 20 MEQ
30 PACKET (EA) ORAL ONCE
Refills: 0 | Status: COMPLETED | OUTPATIENT
Start: 2019-10-02 | End: 2019-10-02

## 2019-10-02 RX ORDER — INSULIN GLARGINE 100 [IU]/ML
48 INJECTION, SOLUTION SUBCUTANEOUS EVERY MORNING
Refills: 0 | Status: DISCONTINUED | OUTPATIENT
Start: 2019-10-02 | End: 2019-10-03

## 2019-10-02 RX ORDER — POTASSIUM CHLORIDE 20 MEQ
40 PACKET (EA) ORAL EVERY 4 HOURS
Refills: 0 | Status: COMPLETED | OUTPATIENT
Start: 2019-10-02 | End: 2019-10-02

## 2019-10-02 RX ORDER — SENNA PLUS 8.6 MG/1
2 TABLET ORAL AT BEDTIME
Refills: 0 | Status: DISCONTINUED | OUTPATIENT
Start: 2019-10-02 | End: 2019-10-04

## 2019-10-02 RX ORDER — DOCUSATE SODIUM 100 MG
100 CAPSULE ORAL THREE TIMES A DAY
Refills: 0 | Status: DISCONTINUED | OUTPATIENT
Start: 2019-10-02 | End: 2019-10-04

## 2019-10-02 RX ORDER — INSULIN LISPRO 100/ML
VIAL (ML) SUBCUTANEOUS AT BEDTIME
Refills: 0 | Status: DISCONTINUED | OUTPATIENT
Start: 2019-10-02 | End: 2019-10-04

## 2019-10-02 RX ORDER — DEXTROSE 50 % IN WATER 50 %
25 SYRINGE (ML) INTRAVENOUS ONCE
Refills: 0 | Status: DISCONTINUED | OUTPATIENT
Start: 2019-10-02 | End: 2019-10-04

## 2019-10-02 RX ORDER — INSULIN LISPRO 100/ML
16 VIAL (ML) SUBCUTANEOUS
Refills: 0 | Status: DISCONTINUED | OUTPATIENT
Start: 2019-10-02 | End: 2019-10-03

## 2019-10-02 RX ORDER — DEXTROSE 50 % IN WATER 50 %
15 SYRINGE (ML) INTRAVENOUS ONCE
Refills: 0 | Status: DISCONTINUED | OUTPATIENT
Start: 2019-10-02 | End: 2019-10-04

## 2019-10-02 RX ORDER — POTASSIUM CHLORIDE 20 MEQ
10 PACKET (EA) ORAL
Refills: 0 | Status: COMPLETED | OUTPATIENT
Start: 2019-10-02 | End: 2019-10-02

## 2019-10-02 RX ORDER — INSULIN LISPRO 100/ML
5 VIAL (ML) SUBCUTANEOUS
Refills: 0 | Status: DISCONTINUED | OUTPATIENT
Start: 2019-10-02 | End: 2019-10-02

## 2019-10-02 RX ORDER — HUMAN INSULIN 100 [IU]/ML
5 INJECTION, SUSPENSION SUBCUTANEOUS
Refills: 0 | Status: DISCONTINUED | OUTPATIENT
Start: 2019-10-02 | End: 2019-10-02

## 2019-10-02 RX ORDER — SODIUM CHLORIDE 9 MG/ML
1000 INJECTION, SOLUTION INTRAVENOUS
Refills: 0 | Status: DISCONTINUED | OUTPATIENT
Start: 2019-10-02 | End: 2019-10-04

## 2019-10-02 RX ADMIN — Medication 100 MILLIEQUIVALENT(S): at 04:54

## 2019-10-02 RX ADMIN — Medication 4: at 18:35

## 2019-10-02 RX ADMIN — DEXTROSE MONOHYDRATE, SODIUM CHLORIDE, AND POTASSIUM CHLORIDE 150 MILLILITER(S): 50; .745; 4.5 INJECTION, SOLUTION INTRAVENOUS at 00:56

## 2019-10-02 RX ADMIN — Medication 62.5 MILLIMOLE(S): at 00:51

## 2019-10-02 RX ADMIN — CHLORHEXIDINE GLUCONATE 1 APPLICATION(S): 213 SOLUTION TOPICAL at 06:05

## 2019-10-02 RX ADMIN — Medication 4: at 08:59

## 2019-10-02 RX ADMIN — Medication 40 MILLIEQUIVALENT(S): at 18:35

## 2019-10-02 RX ADMIN — SENNA PLUS 2 TABLET(S): 8.6 TABLET ORAL at 23:10

## 2019-10-02 RX ADMIN — Medication 3: at 23:09

## 2019-10-02 RX ADMIN — Medication 13 UNIT(S): at 12:09

## 2019-10-02 RX ADMIN — INSULIN GLARGINE 25 UNIT(S): 100 INJECTION, SOLUTION SUBCUTANEOUS at 06:04

## 2019-10-02 RX ADMIN — INSULIN GLARGINE 15 UNIT(S): 100 INJECTION, SOLUTION SUBCUTANEOUS at 08:54

## 2019-10-02 RX ADMIN — Medication 30 MILLIEQUIVALENT(S): at 03:11

## 2019-10-02 RX ADMIN — Medication 16 UNIT(S): at 18:35

## 2019-10-02 RX ADMIN — Medication 100 MILLIEQUIVALENT(S): at 06:36

## 2019-10-02 RX ADMIN — Medication 100 MILLIGRAM(S): at 14:28

## 2019-10-02 RX ADMIN — Medication 40 MILLIEQUIVALENT(S): at 12:07

## 2019-10-02 RX ADMIN — Medication 13 UNIT(S): at 09:39

## 2019-10-02 RX ADMIN — Medication 5 UNIT(S): at 08:59

## 2019-10-02 RX ADMIN — Medication 4: at 12:10

## 2019-10-02 RX ADMIN — Medication 100 MILLIGRAM(S): at 23:10

## 2019-10-02 RX ADMIN — Medication 100 MILLIGRAM(S): at 05:02

## 2019-10-02 RX ADMIN — Medication 100 MILLIEQUIVALENT(S): at 03:13

## 2019-10-02 NOTE — CHART NOTE - NSCHARTNOTEFT_GEN_A_CORE
MICU Transfer Note    Transfer from: MICU    Transfer to: ( X ) Medicine    (  ) Telemetry     (   ) RCU        (    ) Palliative         (   ) Stroke Unit          (   ) __________________    Accepting Physician: Dr. Acevedo    MICU COURSE:  34yo male with PMHx of morbid obesity (BMI 48.3) presenting to the ED 9/30 with abdominal pain x 2 weeks, increased polydypsia and polyuria for last few days (urinating up to 25 times a day). According to documentation, he was meeting for life insurance and had blood work performed with reported elevated LFTs. No known h/o diabetes. Reports intermittent abdominal discomfort in the lower abdomin and some pain in the RUQ. Pain is intermittent, may worsen with food and water (up to 7/10) and improves when patient is lying flat on the bed. Reports decreased appetite, fatigue, weight loss, and decreased bowel movements (once every 3 days and his norm is once a day) with constipation. Patient denies vomiting and diarrhea, no black or bloody stools.  In the ED, finger stick was 373. K+ 2.9, VBG showed pH 7.24, HCO3 8, AG 26, lactate 1.4. beta hydroxy butyrate >8.0. Patient was given 2L of LR IVF and transferred to the MICU for insulin gtt management of DKA.     Pt remained on insulin protocol management with most recent labs 10/2 AM revealing pH of 7.39, AG 14, HCO3 18. In preparation to transition pt off insulin gtt, D5NS decreased to 75cc/hr, Lantus 25U ordered at 5 am, then an additional 15U (as determined Lantus 25U insufficient) with Humalog 13 TID with meals.  Denies abdominal pain, n/v/d/c, chills, fever, SOB, chest pain, headache. Stable for transfer to the floors.    FOR FOLLOW UP:  - Lantus 40 U in morning, Humalog 15 U pre-meal with Moderate ISS, f/u FS and adjust accordingly  - F/u Endocrinology recommendations   - Diabetic and Nutritional counseling, encourage ambulation   - f/u BMP and replete potassium. MICU Transfer Note    Transfer from: MICU    Transfer to: ( X ) Medicine    (  ) Telemetry     (   ) RCU        (    ) Palliative         (   ) Stroke Unit          (   ) __________________    Accepting Physician: Dr. Acevedo    MICU COURSE:  32yo male with PMHx of morbid obesity (BMI 48.3) presenting to the ED 9/30 with abdominal pain x 2 weeks, increased polydypsia and polyuria for last few days (urinating up to 25 times a day). According to documentation, he was meeting for life insurance and had blood work performed with reported elevated LFTs. No known h/o diabetes. Reports intermittent abdominal discomfort in the lower abdomin and some pain in the RUQ. Pain is intermittent, may worsen with food and water (up to 7/10) and improves when patient is lying flat on the bed. Reports decreased appetite, fatigue, weight loss, and decreased bowel movements (once every 3 days and his norm is once a day) with constipation. Patient denies vomiting and diarrhea, no black or bloody stools.  In the ED, finger stick was 373. K+ 2.9, VBG showed pH 7.24, HCO3 8, AG 26, lactate 1.4. beta hydroxy butyrate >8.0. Patient was given 2L of LR IVF and transferred to the MICU for insulin gtt management of DKA.     Pt remained on insulin protocol management with most recent labs 10/2 AM revealing pH of 7.39, AG 14, HCO3 18. In preparation to transition pt off insulin gtt, D5NS decreased to 75cc/hr, Lantus 25U ordered at 5 am, then an additional 15U (as determined Lantus 25U insufficient) with Humalog 13 TID with meals.  Denies abdominal pain, n/v/d/c, chills, fever, SOB, chest pain, headache. Stable for transfer to the floors.    FOR FOLLOW UP:  - Lantus 40 U in morning, Humalog 15 U pre-meal with Moderate ISS, f/u FS and adjust accordingly  - F/u Endocrinology recommendations   - Diabetic and Nutritional counseling, encourage ambulation   - f/u BMP and replete potassium and phosphate

## 2019-10-02 NOTE — DIETITIAN INITIAL EVALUATION ADULT. - PERTINENT MEDS FT
MEDICATIONS  (STANDING):  chlorhexidine 4% Liquid 1 Application(s) Topical <User Schedule>  dextrose 5%. 1000 milliLiter(s) (50 mL/Hr) IV Continuous <Continuous>  dextrose 50% Injectable 12.5 Gram(s) IV Push once  dextrose 50% Injectable 25 Gram(s) IV Push once  dextrose 50% Injectable 25 Gram(s) IV Push once  docusate sodium 100 milliGRAM(s) Oral three times a day  influenza   Vaccine 0.5 milliLiter(s) IntraMuscular once  insulin lispro (HumaLOG) corrective regimen sliding scale   SubCutaneous three times a day before meals  insulin lispro (HumaLOG) corrective regimen sliding scale   SubCutaneous at bedtime  insulin lispro Injectable (HumaLOG) 13 Unit(s) SubCutaneous three times a day before meals  insulin regular Infusion 5 Unit(s)/Hr (5 mL/Hr) IV Continuous <Continuous>  potassium chloride    Tablet ER 20 milliEquivalent(s) Oral daily  potassium chloride    Tablet ER 40 milliEquivalent(s) Oral every 4 hours  senna 2 Tablet(s) Oral at bedtime    MEDICATIONS  (PRN):  bisacodyl Suppository 10 milliGRAM(s) Rectal once PRN Constipation  dextrose 40% Gel 15 Gram(s) Oral once PRN Blood Glucose LESS THAN 70 milliGRAM(s)/deciLiter  glucagon  Injectable 1 milliGRAM(s) IntraMuscular once PRN Glucose <70 milliGRAM(s)/deciLiter

## 2019-10-02 NOTE — PROGRESS NOTE ADULT - ASSESSMENT
Mr. Pearson is a pleasant 32 yo male with morbid obesity (BMI 48.3) presenting with abdominal pain, polyuria and polydipsia, found to have DKA and new onset diabetes.     #Neuro  -A&Ox4, no issues, will continue to monitor    #Endo  DKA, likely T2DM, given age, BMI. HbA1C of 11.3  - no clear exacerbant as of yet. Lipase negative, no diarrhea, no URI or PNA  - VBG showed pH 7.24, HCO3 8, AG 26, lactate 1.4. beta hydroxy butyrate >8. large ketones in the urine.   - K4.1 after repletion, but will monitor BMP q4h  - s/p 4L LRs, and started on D5+NS at 150/hr  - BMP q4h -> AG 18, HCO3 9.   - Blood sugar now 198  - insulin gtt 8u -> 5u  - Replete K+ as needed.   - Endo consult in the AM    #Resp  -Lungs clear, will continue to monitor    #CV  -no cardiac issues  -cholesterol 160, HDL 23, LDL 77, .    #GI  Patient has symptoms of biliary colic, likely due to biliary sludge or stones  - diabetic clears for now, patient denies nausea and vomiting  - lipase negative, ALT 76.   - will f/u RUQ ultrasound to look for NAFLD or biliary issues.     #ID  -UA negative, no clear source of infection currently    #Renal/  -polyuria likely secondary to T2DM.   -will f/u BMP and monitor Is/Os    #Heme  -No white count, no issues    #DVT PPx  -low risk for DVT, encourage patient to walk and move around Mr. Pearson is a pleasant 34 yo male with morbid obesity (BMI 48.3) presenting with abdominal pain, polyuria and polydipsia on 9/30, found to have DKA and new onset diabetes, now s/p transition off insulin gtt AM of 10/2.    #Neuro  -A&Ox4, no issues, will continue to monitor    #Endo  DKA, likely T2DM, given age and BMI. HbA1C of 11.3  - no clear as to etiology of exacerbate as of yet. Lipase negative, no diarrhea, no URI or PNA  - VBG showed pH 7.24, HCO3 8, AG 26, lactate 1.4. beta hydroxy butyrate >8. large ketones in the urine.   - K requiring continuous repletion, will monitor BMP q4h, replete K as needed  - s/p 4L LRs, and started on D5+NS at 150/hr now decreased to 75cc/hr  - BMP q4h: Most recent labs revealing AG 15 -> 16 -> 14 -> 14, HCO3 15 -> 15 -> 17 -> 18. pH 7.39  - Blood sugar remained high 100s - low 200s overnight (pt did admit to drinking tea/juice overnight while on D5NS at 150/hr)  - insulin gtt began at 8u, now at 5u and will transition with Lantus 25 U this AM and d/c gtt 2 hours s/p. Written for NPH 5U pre-meal and MISS.  - f/u Endo recommendations     #Resp  - Lungs clear to auscultation   - no active issues  - will continue to monitor SpO2    #CV  - no active cardiac issues at this time  - will continue to monitor   - cholesterol 160, HDL 23, LDL 77, .    #GI  Patient complained of symptoms of biliary colic upon presentation, likely due to biliary sludge or stones  - pt now asymptomatic, tolerating PO diet (will advance) and having normal BMs   - lipase negative, ALT 76.   - RUQ US revealing hepatomegaly, diffuse hepatic steatosis   - will w/u hepatic pathology  Morbid Obesity with BMI 48.3  - nutrition counseling     #ID  - UA negative, no clear source of infection currently  - WBC WNL and afebrile     #Renal/  -polyuria likely secondary to T2DM  -will f/u BMP and monitor Is/Os    #Heme  - No active issues  - will continue to monitor CBC qd    #DVT PPx  -low risk for DVT, OOB to chair/encourage ambulation Mr. Pearson is a pleasant 32 yo male with morbid obesity (BMI 48.3) presenting with abdominal pain, polyuria and polydipsia on 9/30, found to have DKA and new onset diabetes, now s/p transition off insulin gtt AM of 10/2.    #Neuro  -A&Ox4, no issues, will continue to monitor    #Endo  DKA, likely T2DM, given age and BMI. HbA1C of 11.3  - no clear as to etiology of exacerbate as of yet. Lipase negative, no diarrhea, no URI or PNA  - VBG showed pH 7.24, HCO3 8, AG 26, lactate 1.4. beta hydroxy butyrate >8. large ketones in the urine.   - K requiring continuous repletion, will monitor BMP q4h, replete K as needed  - s/p 4L LRs, and started on D5+NS at 150/hr now decreased to 75cc/hr  - BMP q4h: Most recent labs revealing AG 15 -> 16 -> 14 -> 14, HCO3 15 -> 15 -> 17 -> 18. pH 7.39  - Blood sugar remained high 100s - low 200s overnight (pt did admit to drinking tea/juice overnight while on D5NS at 150/hr)  - insulin gtt began at 8u, now at 5u and will transition with Lantus 25 U this AM and d/c gtt 2 hours s/p. Written for NPH 5U pre-meal and MISS.  - advance diet to regular (carb restricted)  - f/u Endo recommendations     #Resp  - Lungs clear to auscultation   - no active issues  - will continue to monitor SpO2    #CV  - no active cardiac issues at this time  - will continue to monitor   - cholesterol 160, HDL 23, LDL 77, .    #GI  Patient complained of symptoms of biliary colic upon presentation, likely due to biliary sludge or stones  - pt now asymptomatic, tolerating PO diet (will advance to carb-restricted regular diet) and having normal BMs   - lipase negative, ALT 76.   - RUQ US revealing hepatomegaly, diffuse hepatic steatosis   - will w/u hepatic pathology  Morbid Obesity with BMI 48.3  - nutrition counseling     #ID  - UA negative, no clear source of infection currently  - WBC WNL and afebrile     #Renal/  -polyuria likely secondary to T2DM  -will f/u BMP and monitor Is/Os    #Heme  - No active issues  - will continue to monitor CBC qd    #DVT PPx  -low risk for DVT, OOB to chair/encourage ambulation Mr. Pearson is a pleasant 34 yo male with morbid obesity (BMI 48.3) presenting with abdominal pain, polyuria and polydipsia on 9/30, found to have DKA and new onset diabetes, now s/p transition off insulin gtt AM of 10/2.    #Neuro  -A&Ox4, no issues, will continue to monitor    #Endo  DKA, likely T2DM, given age and BMI. HbA1C of 11.3  - no clear as to etiology of exacerbate as of yet. Lipase negative, no diarrhea, no URI or PNA  - VBG showed pH 7.24, HCO3 8, AG 26, lactate 1.4. beta hydroxy butyrate >8. large ketones in the urine.   - K requiring continuous repletion, will monitor BMP q4h, replete K as needed  - s/p 4L LRs, and started on D5+NS at 150/hr now decreased to 75cc/hr and titrated off  - BMP q4h: Most recent labs revealing AG 16 -> 14 -> 14 ->17, HCO3 15 -> 17 -> 18 -> 15. pH 7.39  - Blood sugar remained high 100s - low 200s overnight (pt did admit to drinking tea/juice overnight while on D5NS at 150/hr)  - insulin gtt began at 8u, now at 5u and will transition with Lantus 25 U this AM and d/c gtt 2 hours s/p. Written for NPH 5U pre-meal and MISS.  - advance diet to regular (carb restricted)  - f/u Endo recommendations     #Resp  - Lungs clear to auscultation   - no active issues  - will continue to monitor SpO2    #CV  - no active cardiac issues at this time  - will continue to monitor   - cholesterol 160, HDL 23, LDL 77, .    #GI  Patient complained of symptoms of biliary colic upon presentation, likely due to biliary sludge or stones  - pt now asymptomatic, tolerating PO diet (will advance to carb-restricted regular diet) and having normal BMs   - lipase negative, ALT 76.   - RUQ US revealing hepatomegaly, diffuse hepatic steatosis   - will w/u hepatic pathology  Morbid Obesity with BMI 48.3  - nutrition counseling     #ID  - UA negative, no clear source of infection currently  - WBC WNL and afebrile     #Renal/  -polyuria likely secondary to T2DM  -will f/u BMP and monitor Is/Os    #Heme  - No active issues  - will continue to monitor CBC qd    #DVT PPx  -low risk for DVT, OOB to chair/encourage ambulation Mr. Pearson is a pleasant 34 yo male with morbid obesity (BMI 48.3) presenting with abdominal pain, polyuria and polydipsia on 9/30, found to have DKA and new onset diabetes, now s/p transition off insulin gtt AM of 10/2.    #Neuro  -A&Ox4, no issues, will continue to monitor    #Endo  DKA, likely T2DM, given age and BMI. HbA1C of 11.3  - no clear as to etiology of exacerbate as of yet. Lipase negative, no diarrhea, no URI or PNA  - VBG showed pH 7.24, HCO3 8, AG 26, lactate 1.4. beta hydroxy butyrate >8. large ketones in the urine.   - K requiring continuous repletion, will monitor BMP q4h, replete K as needed  - s/p 4L LRs, and started on D5+NS at 150/hr now decreased to 75cc/hr and titrated off  - BMP q4h: Most recent labs revealing AG 16 -> 14 -> 14 ->17, HCO3 15 -> 17 -> 18 -> 15. pH 7.39  - Blood sugar remained high 100s - low 200s overnight (pt did admit to drinking tea/juice overnight while on D5NS at 150/hr)  - insulin gtt began at 8u, now at 5u and will transition with Lantus 25 U this AM however with persistently elevated FS/decreasing HCO3/increasing AG s/p Lantus - will give additional Lantus 15 U and Humalog 5 TID with meals and plan for Lantus 40 U q am.   - advance diet to regular (carb restricted)  - f/u Endo recommendations     #Resp  - Lungs clear to auscultation   - no active issues  - will continue to monitor SpO2    #CV  - no active cardiac issues at this time  - will continue to monitor   - cholesterol 160, HDL 23, LDL 77, .    #GI  Patient complained of symptoms of biliary colic upon presentation, likely due to biliary sludge or stones  - pt now asymptomatic, tolerating PO diet (will advance to carb-restricted regular diet) and having normal BMs   - lipase negative, ALT 76.   - RUQ US revealing hepatomegaly, diffuse hepatic steatosis   - will w/u hepatic pathology  Morbid Obesity with BMI 48.3  - nutrition counseling     #ID  - UA negative, no clear source of infection currently  - WBC WNL and afebrile     #Renal/  -polyuria likely secondary to T2DM  -will f/u BMP and monitor Is/Os    #Heme  - No active issues  - will continue to monitor CBC qd    #DVT PPx  -low risk for DVT, OOB to chair/encourage ambulation Mr. Pearson is a pleasant 32 yo male with morbid obesity (BMI 48.3) presenting with abdominal pain, polyuria and polydipsia on 9/30, found to have DKA and new onset diabetes, now s/p transition off insulin gtt AM of 10/2.    #Neuro  -A&Ox4, no issues, will continue to monitor    #Endo  DKA, likely T2DM, given age and BMI. HbA1C of 11.3  - no clear as to etiology of exacerbate as of yet. Lipase negative, no diarrhea, no URI or PNA  - VBG showed pH 7.24, HCO3 8, AG 26, lactate 1.4. beta hydroxy butyrate >8. large ketones in the urine.   - K requiring continuous repletion, will monitor BMP q4h, replete K as needed  - s/p 4L LRs, and started on D5+NS at 150/hr now decreased to 75cc/hr and titrated off  - BMP q4h: Most recent labs revealing AG 16 -> 14 -> 14 ->17, HCO3 15 -> 17 -> 18 -> 15. pH 7.39  - Blood sugar remained high 100s - low 200s overnight (pt did admit to drinking tea/juice overnight while on D5NS at 150/hr)  - insulin gtt began at 8u, now at 5u and will transition with Lantus 25 U this AM however with persistently elevated FS/decreasing HCO3/increasing AG s/p Lantus - will give additional Lantus 15 U and Humalog 13 TID with meals and plan for Lantus 40 U q am.   - advance diet to regular (carb restricted)  - f/u Endo recommendations     #Resp  - Lungs clear to auscultation   - no active issues  - will continue to monitor SpO2    #CV  - no active cardiac issues at this time  - will continue to monitor   - cholesterol 160, HDL 23, LDL 77, .    #GI  Patient complained of symptoms of biliary colic upon presentation, likely due to biliary sludge or stones  - pt now asymptomatic, tolerating PO diet (will advance to carb-restricted regular diet) and having normal BMs   - lipase negative, ALT 76.   - RUQ US revealing hepatomegaly, diffuse hepatic steatosis   - will w/u hepatic pathology  Morbid Obesity with BMI 48.3  - nutrition counseling     #ID  - UA negative, no clear source of infection currently  - WBC WNL and afebrile     #Renal/  -polyuria likely secondary to T2DM  -will f/u BMP and monitor Is/Os    #Heme  - No active issues  - will continue to monitor CBC qd    #DVT PPx  -low risk for DVT, OOB to chair/encourage ambulation

## 2019-10-02 NOTE — CHART NOTE - NSCHARTNOTEFT_GEN_A_CORE
Patient is a 33y old  Male who presents with a chief complaint of abdominal pain    HPI:  33M h/o morbid obesity (BMI 48.3) p/w abdominal pain for 2 weeks, increased polydypsia and polyuria for last few days (urinating up to 25 times a day). He was meeting for life insurance and had blood work performed with reported elevated lft. No known h/o diabetes. Reports intermittent abdominal discomfort in the lower abdomin and some pain in the RUQ. Pain is intermittent, may worsen with food and water (up to 7/10) and improves when patient is lying flat on the bed.     In the ED, finger stick was 373. K+ 2.9, VBG showed pH 7.24, HCO3 8, AG 26, lactate 1.4. beta hydroxy butyrate is pending now. Patient was given 2L of LR and transferred to the MICU for DKA.     In the MICU, remained on insulin protocol management with most recent labs 10/2 AM revealing pH of 7.39, AG 14, HCO3 18. In preparation to transition pt off insulin gtt, D5NS decreased to 75cc/hr, Lantus 25U ordered at 5 am, then an additional 15U (as determined Lantus 25U insufficient) with Humalog 13 TID with meals. Pt seen AM 10/2, states he is feeling better without any complaints. Denies abdominal pain, n/v/d/c, chills, fever, SOB, chest pain, headache. Stable for transfer to the floors.     Currently pt only reports constipation; last BM was 4 days ago and senna did not help. Denies fevers, chills, nausea, vomiting, diarrhea, and abdominal pain.    PMHx/PSHx:   PAST MEDICAL & SURGICAL HISTORY:  Morbid obesity with body mass index (BMI) of 40.0 to 49.9  History of heartburn    Social Hx:   Former Smoker 1 pack a day for 3-4 years, quit 3 weeks ago. No EtOH or illicit drug use. , lives with his wife. Works as a . Adv directives: full code.    Allergies:   No Known Allergies      Medications Standing   MEDICATIONS  (STANDING):  chlorhexidine 4% Liquid 1 Application(s) Topical <User Schedule>  dextrose 5%. 1000 milliLiter(s) (50 mL/Hr) IV Continuous <Continuous>  dextrose 50% Injectable 12.5 Gram(s) IV Push once  dextrose 50% Injectable 25 Gram(s) IV Push once  dextrose 50% Injectable 25 Gram(s) IV Push once  docusate sodium 100 milliGRAM(s) Oral three times a day  influenza   Vaccine 0.5 milliLiter(s) IntraMuscular once  insulin glargine Injectable (LANTUS) 48 Unit(s) SubCutaneous every morning  insulin lispro (HumaLOG) corrective regimen sliding scale   SubCutaneous three times a day before meals  insulin lispro (HumaLOG) corrective regimen sliding scale   SubCutaneous at bedtime  insulin lispro Injectable (HumaLOG) 16 Unit(s) SubCutaneous three times a day before meals  potassium chloride    Tablet ER 20 milliEquivalent(s) Oral daily  potassium chloride    Tablet ER 40 milliEquivalent(s) Oral every 4 hours  senna 2 Tablet(s) Oral at bedtime      Medications PRN   MEDICATIONS  (PRN):  bisacodyl Suppository 10 milliGRAM(s) Rectal once PRN Constipation  dextrose 40% Gel 15 Gram(s) Oral once PRN Blood Glucose LESS THAN 70 milliGRAM(s)/deciLiter  glucagon  Injectable 1 milliGRAM(s) IntraMuscular once PRN Glucose <70 milliGRAM(s)/deciLiter      Vital Signs Last 24 Hrs  T(C): 36.7 (02 Oct 2019 12:00), Max: 37.3 (01 Oct 2019 18:00)  T(F): 98 (02 Oct 2019 12:00), Max: 99.2 (01 Oct 2019 18:00)  HR: 98 (02 Oct 2019 15:00) (81 - 106)  BP: 123/64 (02 Oct 2019 15:00) (95/53 - 130/74)  BP(mean): 86 (02 Oct 2019 15:00) (68 - 101)  RR: 28 (02 Oct 2019 15:00) (20 - 38)  SpO2: 94% (02 Oct 2019 15:00) (93% - 98%)    PHYSICAL EXAM:  GENERAL: No acute distress, well-developed  HEAD:  Atraumatic, Normocephalic  EYES: EOMI, PERRLA, conjunctiva and sclera clear  NECK: Supple, no lymphadenopathy, no JVD  CHEST/LUNG: CTAB; No wheezes, rales, or rhonchi  HEART: Regular rate and rhythm. Normal S1/S2. No murmurs, rubs, or gallops  ABDOMEN: Soft, non-tender, non-distended; normal bowel sounds, no organomegaly  EXTREMITIES:  2+ peripheral pulses b/l, No clubbing, cyanosis, or edema  NEUROLOGY: A&O x 3, no focal deficits  SKIN: No rashes or lesions    LABS   CBC                       13.8   5.92  )-----------( 147      ( 02 Oct 2019 02:01 )             38.2     CMP 10-02    140  |  106  |  <4<L>  ----------------------------<  287<H>  3.6   |  18<L>  |  0.56    Ca    8.8      02 Oct 2019 11:02  Phos  2.2     10-02  Mg     1.7     10-02    TPro  6.2  /  Alb  3.7  /  TBili  0.7  /  DBili  x   /  AST  41<H>  /  ALT  72<H>  /  AlkPhos  66  10-02      Radiology:     Assessment:   33M h/o morbid obesity (BMI 48.3) presenting with abdominal pain, polyuria and polydipsia on 9/30, found to have DKA and new onset diabetes, now s/p transition off insulin gtt AM of 10/2.    Plan:   1. DKA  Likely T2DM, given age and BMI. HbA1c of 11.3  - No clear as to etiology of exacerbate as of yet. Lipase negative, no diarrhea, no URI or PNA  - VBG showed pH 7.24, HCO3 8, AG 26, lactate 1.4. beta hydroxy butyrate >8. large ketones in the urine.   - Blood sugar remained high 100s - low 200s overnight (pt did admit to drinking tea/juice overnight while on D5NS at 150/hr)  - Appreciate endo recs; inc Lantus 48U in the AM and 16U Humalog TID, c/w mod scale SSI qac and qhs   - CC diet  - Will f/u BMP and replete K+ PRN    2. Biliary Colic  Patient complained of symptoms of biliary colic upon presentation, likely due to biliary sludge or stones  - pt now asymptomatic, tolerating PO diet (will advance to carb-restricted regular diet)  - lipase negative, ALT 76.   - RUQ US revealing hepatomegaly, diffuse hepatic steatosis   - will w/u hepatic pathology    3. Polyuria  Likely secondary to T2DM  - Will f/u BMP    4. Morbid Obesity  BMI 48.3  - Diabetic and nutrition counseling    5. Need for Prophylactic Measure  DVT ppx: low risk, encourage ambulation  Diet: CC      Fidel Kinney MD  Internal Medicine PGY-1  [phone] / [phone]

## 2019-10-02 NOTE — DIETITIAN INITIAL EVALUATION ADULT. - OTHER INFO
Pt seen for: BMI > 40, new DM  Adm dx: DKA    GI issues: denies N/V   Last BM: 9/28 (bowel regimen ordered)    Food allergies/Intolerances: NKFA    Vit/supplement PTA: vit E, C    Diet PTA: no diet restrictions, usually eats 1 meal a day, has been eating a lot of fruit, does drink juice, has sugared drinks     Subjective/Objective information: pt reports good appetite PTA, reports usual wt 330-337 lb, no changes over past year. No recent attempt at wt loss, dosing wt 344 lb.    Education: Type 2 DM  Nutrition Therapy handout provided and reviewed. Pt had no prior knowledge of carb containing foods. Discussed importance of consistent meal timing, not skipping meals, portion control, signs/symptoms of hyper and hypoglycemia. Importance of wt loss for overall health and DM control emphasized. Copy of Ambulatory Nutrition services provided.

## 2019-10-02 NOTE — DIETITIAN INITIAL EVALUATION ADULT. - ENERGY NEEDS
Ht: 71"   Wt: 344  BMI: 48.3 kg/m2   IBW: 172 (+/-10%)     200% IBW  Edema: none        Skin: no pressure injuries documented

## 2019-10-02 NOTE — PROGRESS NOTE ADULT - ASSESSMENT
33 yr M with morbid obesity here with DKA and newly diagnosed with diabetes (likely Type 2 given morbid obesity).

## 2019-10-02 NOTE — PROGRESS NOTE ADULT - SUBJECTIVE AND OBJECTIVE BOX
Chief Complaint: f/u DKA    History:  Patient states that he is eating all of his meals. Was transitioned to basal bolus this morning. Does not have nausea, vomiting, abdominal pain. States he received insulin teaching with vial and syringe earlier today.     MEDICATIONS  (STANDING):  chlorhexidine 4% Liquid 1 Application(s) Topical <User Schedule>  dextrose 5%. 1000 milliLiter(s) (50 mL/Hr) IV Continuous <Continuous>  dextrose 50% Injectable 12.5 Gram(s) IV Push once  dextrose 50% Injectable 25 Gram(s) IV Push once  dextrose 50% Injectable 25 Gram(s) IV Push once  docusate sodium 100 milliGRAM(s) Oral three times a day  influenza   Vaccine 0.5 milliLiter(s) IntraMuscular once  insulin lispro (HumaLOG) corrective regimen sliding scale   SubCutaneous three times a day before meals  insulin lispro (HumaLOG) corrective regimen sliding scale   SubCutaneous at bedtime  insulin lispro Injectable (HumaLOG) 13 Unit(s) SubCutaneous three times a day before meals  potassium chloride    Tablet ER 20 milliEquivalent(s) Oral daily  potassium chloride    Tablet ER 40 milliEquivalent(s) Oral every 4 hours  senna 2 Tablet(s) Oral at bedtime    MEDICATIONS  (PRN):  bisacodyl Suppository 10 milliGRAM(s) Rectal once PRN Constipation  dextrose 40% Gel 15 Gram(s) Oral once PRN Blood Glucose LESS THAN 70 milliGRAM(s)/deciLiter  glucagon  Injectable 1 milliGRAM(s) IntraMuscular once PRN Glucose <70 milliGRAM(s)/deciLiter      Allergies  No Known Allergies    PHYSICAL EXAM:  VITALS: T(C): 36.7 (10-02-19 @ 12:00)  T(F): 98 (10-02-19 @ 12:00), Max: 99.2 (10-01-19 @ 18:00)  HR: 98 (10-02-19 @ 15:00) (81 - 106)  BP: 123/64 (10-02-19 @ 15:00) (95/53 - 130/74)  RR:  (18 - 38)  SpO2:  (93% - 98%)  Wt(kg): --  GENERAL: NAD, well-developed  RESPIRATORY: Clear to auscultation bilaterally; No rales, rhonchi, wheezing, or rubs  CARDIOVASCULAR: Regular rate and rhythm; No murmurs  GI: Soft, nontender, non distended  PSYCH: Alert and oriented x 3, reactive affect    POCT Blood Glucose.: 246 mg/dL (10-02-19 @ 12:06) H 13, H 4  POCT Blood Glucose.: 207 mg/dL (10-02-19 @ 08:01) L 15, H 13, H 4  POCT Blood Glucose.: 258 mg/dL (10-02-19 @ 06:56)  POCT Blood Glucose.: 228 mg/dL (10-02-19 @ 06:08) L 25  POCT Blood Glucose.: 241 mg/dL (10-02-19 @ 05:28)  POCT Blood Glucose.: 218 mg/dL (10-02-19 @ 04:04)  POCT Blood Glucose.: 213 mg/dL (10-02-19 @ 03:02)  POCT Blood Glucose.: 197 mg/dL (10-02-19 @ 01:55)  POCT Blood Glucose.: 197 mg/dL (10-02-19 @ 00:53)  POCT Blood Glucose.: 186 mg/dL (10-01-19 @ 23:59)  POCT Blood Glucose.: 166 mg/dL (10-01-19 @ 22:51)  POCT Blood Glucose.: 179 mg/dL (10-01-19 @ 22:00)  POCT Blood Glucose.: 197 mg/dL (10-01-19 @ 20:54)  POCT Blood Glucose.: 168 mg/dL (10-01-19 @ 19:53)  POCT Blood Glucose.: 152 mg/dL (10-01-19 @ 17:57)  POCT Blood Glucose.: 153 mg/dL (10-01-19 @ 17:01)  POCT Blood Glucose.: 178 mg/dL (10-01-19 @ 15:42)  POCT Blood Glucose.: 176 mg/dL (10-01-19 @ 15:02)  POCT Blood Glucose.: 187 mg/dL (10-01-19 @ 13:58)  POCT Blood Glucose.: 208 mg/dL (10-01-19 @ 12:47)  POCT Blood Glucose.: 262 mg/dL (10-01-19 @ 11:07)  POCT Blood Glucose.: 260 mg/dL (10-01-19 @ 09:59)  POCT Blood Glucose.: 271 mg/dL (10-01-19 @ 09:57)  POCT Blood Glucose.: 186 mg/dL (10-01-19 @ 09:02)  POCT Blood Glucose.: 182 mg/dL (10-01-19 @ 08:13)  POCT Blood Glucose.: 196 mg/dL (10-01-19 @ 06:04)  POCT Blood Glucose.: 238 mg/dL (10-01-19 @ 05:10)  POCT Blood Glucose.: 292 mg/dL (09-30-19 @ 21:10)  POCT Blood Glucose.: 306 mg/dL (09-30-19 @ 20:10)  POCT Blood Glucose.: 364 mg/dL (09-30-19 @ 17:52)  POCT Blood Glucose.: 373 mg/dL (09-30-19 @ 16:56)      10-02    140  |  106  |  <4<L>  ----------------------------<  287<H>  3.6   |  18<L>  |  0.56    EGFR if : 155  EGFR if non : 134    Ca    8.8      10-02  Mg     1.7     10-02  Phos  2.2     10-02    TPro  6.2  /  Alb  3.7  /  TBili  0.7  /  DBili  x   /  AST  41<H>  /  ALT  72<H>  /  AlkPhos  66  10-02        Hemoglobin A1C, Whole Blood: 11.3 % <H> [4.0 - 5.6] (09-30-19 @ 23:35)

## 2019-10-02 NOTE — PROGRESS NOTE ADULT - SUBJECTIVE AND OBJECTIVE BOX
CHIEF COMPLAINT:  Patient is a 33y old  Male who presents with a chief complaint of abdominal pain (01 Oct 2019 14:00)    HPI:  Pt is a 34yo male with PMHx of morbid obesity (BMI 48.3) presenting to the ED  with abdominal pain x 2 weeks, increased polydypsia and polyuria for last few days (urinating up to 25 times a day). He was meeting for life insurance and had blood work performed with reported elevated lft. No known h/o diabetes. Reports intermittent abdominal discomfort in the lower abdomin and some pain in the RUQ. Pain is intermittent, may worsen with food and water (up to 7/10) and improves when patient is lying flat on the bed. Reports decreased appetite, weight loss, and decreased bowel movements (once every 3 days and his norm is once a day) with constipation. Patient denies vomiting and diarrhea. Has been feeling fatigued. no black or bloody stools. Denies chest pain.    In the ED, finger stick was 373. K+ 2.9, VBG showed pH 7.24, HCO3 8, AG 26, lactate 1.4. beta hydroxy butyrate is pending now. Patient was given 2L of LR and transferred to the MICU for DKA.     PAST MEDICAL & SURGICAL HISTORY:  obesity    FAMILY HISTORY:  One cousin had diabetes, mom had gyn cancer    HOME MEDICATIONS:  none    CAPILLARY BLOOD GLUCOSE: POCT Blood Glucose.: 306 mg/dL (30 Sep 2019 20:10)    LINES:     HOSPITAL MEDICATIONS:  MEDICATIONS  (STANDING):  chlorhexidine 4% Liquid 1 Application(s) Topical <User Schedule>  potassium chloride  10 mEq/100 mL IVPB 10 milliEquivalent(s) IV Intermittent every 1 hour    MEDICATIONS  (PRN):    MICROBIOLOGY: none    RADIOLOGY:  CXR: Prelim read - no emergent findings    EKG: (30 Sep 2019 20:54)    Interval Events:    REVIEW OF SYSTEMS:    CONSTITUTIONAL: No weakness, fevers or chills  EYES/ENT: No visual changes;  No vertigo or throat pain   NECK: No pain or stiffness  RESPIRATORY: No cough, wheezing, hemoptysis; No shortness of breath  CARDIOVASCULAR: No chest pain or palpitations  GASTROINTESTINAL: No abdominal or epigastric pain. No nausea, vomiting, or hematemesis; No diarrhea or constipation. No melena or hematochezia.  GENITOURINARY: No dysuria, frequency or hematuria  NEUROLOGICAL: No numbness or weakness  SKIN: No itching, rashes      OBJECTIVE:  ICU Vital Signs Last 24 Hrs  T(C): 36.8 (02 Oct 2019 04:00), Max: 37.3 (01 Oct 2019 18:00)  T(F): 98.2 (02 Oct 2019 04:00), Max: 99.2 (01 Oct 2019 18:00)  HR: 97 (02 Oct 2019 04:00) (80 - 106)  BP: 121/72 (02 Oct 2019 04:00) (95/53 - 130/74)  BP(mean): 91 (02 Oct 2019 04:00) (68 - 95)  ABP: --  ABP(mean): --  RR: 31 (02 Oct 2019 04:00) (18 - 38)  SpO2: 96% (02 Oct 2019 04:00) (94% - 97%)         @ 07:01  -  10-01 @ 07:00  --------------------------------------------------------  IN: 4620.9 mL / OUT: 2450 mL / NET: 2170.9 mL    10-01 @ 07:01  -  10-02 @ 05:36  --------------------------------------------------------  IN: 4477.9 mL / OUT: 2950 mL / NET: 1527.9 mL      CAPILLARY BLOOD GLUCOSE      POCT Blood Glucose.: 241 mg/dL (02 Oct 2019 05:28)      PHYSICAL EXAM:    PHYSICAL EXAM:  Neuro:  awake alert oriented x 3, speech clear coherent. makes needs known well and appropriately, follows commands well and appropriately. CN intact. GARCIA well 5/5. Pupils 3 mm reactive equal    Pulm:  utilizing 2 liters N/C breath sounds bilat, diminished in lower lung fields bases to 1/4 up. Clear throughout, able to take deep breaths spontaneously and upon command. SPO2 98%.      CV:  cardiac monitor sinus tach without ectopy, s1/s2 I/VI sys murmur appreciated , peripheral pulses palpable with radial 2+ bilat, dp/pt 1+/1+ bilat, digits warm to touch with good cap refill < 3 secs      GI/:  abd  soft  non distended non tender , + hypoactive bowel sounds. osborn patent to bsd bladder non distended non palpable    Skin:   warm dry intact. without palpable nodes. without JVD appreciated      HOSPITAL MEDICATIONS:  MEDICATIONS  (STANDING):  chlorhexidine 4% Liquid 1 Application(s) Topical <User Schedule>  dextrose 5% + sodium chloride 0.45% with potassium chloride 20 mEq/L 1000 milliLiter(s) (75 mL/Hr) IV Continuous <Continuous>  docusate sodium 100 milliGRAM(s) Oral three times a day  influenza   Vaccine 0.5 milliLiter(s) IntraMuscular once  insulin glargine Injectable (LANTUS) 25 Unit(s) SubCutaneous once  insulin lispro (HumaLOG) corrective regimen sliding scale   SubCutaneous three times a day before meals  insulin lispro (HumaLOG) corrective regimen sliding scale   SubCutaneous at bedtime  insulin NPH human recombinant 5 Unit(s) SubCutaneous three times a day before meals  insulin regular Infusion 5 Unit(s)/Hr (5 mL/Hr) IV Continuous <Continuous>  potassium chloride  10 mEq/100 mL IVPB 10 milliEquivalent(s) IV Intermittent every 1 hour  senna 2 Tablet(s) Oral at bedtime    MEDICATIONS  (PRN):  bisacodyl Suppository 10 milliGRAM(s) Rectal once PRN Constipation      LABS:                        13.8   5.92  )-----------( 147      ( 02 Oct 2019 02:01 )             38.2     Hgb Trend: 13.8<--, 13.3<--, 14.3<--, 16.2<--  10-02    138  |  106  |  <4<L>  ----------------------------<  210<H>  3.4<L>   |  18<L>  |  0.52    Ca    8.4      02 Oct 2019 02:01  Phos  2.4     10-02  Mg     1.9     10    TPro  5.8<L>  /  Alb  3.4  /  TBili  0.5  /  DBili  x   /  AST  33  /  ALT  62<H>  /  AlkPhos  58  1002    LIVER FUNCTIONS - ( 02 Oct 2019 02:01 )  Alb: 3.4 g/dL / Pro: 5.8 g/dL / ALK PHOS: 58 U/L / ALT: 62 U/L / AST: 33 U/L / GGT: x           Creatinine Trend: 0.52<--, 0.54<--, 0.53<--, 0.60<--, 0.64<--, 0.63<--    Urinalysis Basic - ( 30 Sep 2019 18:58 )    Color: Light Yellow / Appearance: Clear / S.032 / pH: x  Gluc: x / Ketone: Large  / Bili: Negative / Urobili: Negative   Blood: x / Protein: 30 mg/dL / Nitrite: Negative   Leuk Esterase: Negative / RBC: 3 /hpf / WBC 1 /HPF   Sq Epi: x / Non Sq Epi: 1 / Bacteria: Negative        Venous Blood Gas:  10-01 @ 18:31  7.39/31/71/18/96  VBG Lactate: 0.6  Venous Blood Gas:  10-01 @ 07:06  7.32/27/70/14/95  VBG Lactate: 0.8  Venous Blood Gas:  10-01 @ 02:41  7.29/28/47/13/82  VBG Lactate: 1.0  Venous Blood Gas:   @ 22:43  7.26/30/27/13/50  VBG Lactate: 1.0  Venous Blood Gas:   @ 18:45  7.24/28/38/11/69  VBG Lactate: 1.4      MICROBIOLOGY:     RADIOLOGY:  [ ] Reviewed and interpreted by me    EKG:      Nghia EID (ext [id number]) CHIEF COMPLAINT:  Patient is a 33y old  Male who presents with a chief complaint of abdominal pain (01 Oct 2019 14:00)    HPI:  Pt is a 34yo male with PMHx of morbid obesity (BMI 48.3) presenting to the ED  with abdominal pain x 2 weeks, increased polydypsia and polyuria for last few days (urinating up to 25 times a day). According to documentation, he was meeting for life insurance and had blood work performed with reported elevated LFTs. No known h/o diabetes. Reports intermittent abdominal discomfort in the lower abdomin and some pain in the RUQ. Pain is intermittent, may worsen with food and water (up to 7/10) and improves when patient is lying flat on the bed. Reports decreased appetite, fatigue, weight loss, and decreased bowel movements (once every 3 days and his norm is once a day) with constipation. Patient denies vomiting and diarrhea, no black or bloody stools.  In the ED, finger stick was 373. K+ 2.9, VBG showed pH 7.24, HCO3 8, AG 26, lactate 1.4. beta hydroxy butyrate >8.0. Patient was given 2L of LR IVF and transferred to the MICU for insulin gtt management of DKA.     Interval Events:  Pt remained on insulin protocol management with most recent labs 10/2 AM revealing pH of 7.39, AG 14, HCO3 18. In preparation to transition pt off insulin gtt, D5NS decreased to 75cc/hr, Lantus 25 U ordered at 5 am with NPH 5 pre-meal.     REVIEW OF SYSTEMS:    CONSTITUTIONAL: No weakness, fevers or chills  EYES/ENT: No visual changes;  No vertigo or throat pain   NECK: No pain or stiffness  RESPIRATORY: No cough, wheezing, hemoptysis; No shortness of breath  CARDIOVASCULAR: No chest pain or palpitations  GASTROINTESTINAL: No abdominal or epigastric pain. No nausea, vomiting, or hematemesis; No diarrhea or constipation. No melena or hematochezia.  GENITOURINARY: No dysuria, frequency or hematuria  NEUROLOGICAL: No numbness or weakness  SKIN: No itching, rashes      OBJECTIVE:  ICU Vital Signs Last 24 Hrs  T(C): 36.8 (02 Oct 2019 04:00), Max: 37.3 (01 Oct 2019 18:00)  T(F): 98.2 (02 Oct 2019 04:00), Max: 99.2 (01 Oct 2019 18:00)  HR: 97 (02 Oct 2019 04:00) (80 - 106)  BP: 121/72 (02 Oct 2019 04:00) (95/53 - 130/74)  BP(mean): 91 (02 Oct 2019 04:00) (68 - 95)  ABP: --  ABP(mean): --  RR: 31 (02 Oct 2019 04:00) (18 - 38)  SpO2: 96% (02 Oct 2019 04:00) (94% - 97%)         @ :01  -  10-01 @ 07:00  --------------------------------------------------------  IN: 4620.9 mL / OUT: 2450 mL / NET: 2170.9 mL    10-01 @ 07:01  -  10-02 @ 05:36  --------------------------------------------------------  IN: 4477.9 mL / OUT: 2950 mL / NET: 1527.9 mL      CAPILLARY BLOOD GLUCOSE      POCT Blood Glucose.: 241 mg/dL (02 Oct 2019 05:28)      PHYSICAL EXAM:    PHYSICAL EXAM:  Neuro:  awake alert oriented x 3, speech clear coherent. makes needs known well and appropriately, follows commands well and appropriately. CN intact. GARCIA well 5/5. Pupils 3 mm reactive equal    Pulm:  utilizing 2 liters N/C breath sounds bilat, diminished in lower lung fields bases to 1/4 up. Clear throughout, able to take deep breaths spontaneously and upon command. SPO2 98%.      CV:  cardiac monitor sinus rhythm without ectopy, no murmurs appreciated , peripheral pulses palpable with radial 2+ bilat, dp/pt 1+/1+ bilat, digits warm to touch with good cap refill < 3 secs      GI/:  abd  soft  non distended non tender , + hypoactive bowel sounds. bladder non distended non palpable    Skin:  warm dry intact. without palpable nodes. without JVD appreciated      HOSPITAL MEDICATIONS:  MEDICATIONS  (STANDING):  chlorhexidine 4% Liquid 1 Application(s) Topical <User Schedule>  dextrose 5% + sodium chloride 0.45% with potassium chloride 20 mEq/L 1000 milliLiter(s) (75 mL/Hr) IV Continuous <Continuous>  docusate sodium 100 milliGRAM(s) Oral three times a day  influenza   Vaccine 0.5 milliLiter(s) IntraMuscular once  insulin glargine Injectable (LANTUS) 25 Unit(s) SubCutaneous once  insulin lispro (HumaLOG) corrective regimen sliding scale   SubCutaneous three times a day before meals  insulin lispro (HumaLOG) corrective regimen sliding scale   SubCutaneous at bedtime  insulin NPH human recombinant 5 Unit(s) SubCutaneous three times a day before meals  insulin regular Infusion 5 Unit(s)/Hr (5 mL/Hr) IV Continuous <Continuous>  potassium chloride  10 mEq/100 mL IVPB 10 milliEquivalent(s) IV Intermittent every 1 hour  senna 2 Tablet(s) Oral at bedtime    MEDICATIONS  (PRN):  bisacodyl Suppository 10 milliGRAM(s) Rectal once PRN Constipation      LABS:                        13.8   5.92  )-----------( 147      ( 02 Oct 2019 02:01 )             38.2     Hgb Trend: 13.8<--, 13.3<--, 14.3<--, 16.2<--  10-02    138  |  106  |  <4<L>  ----------------------------<  210<H>  3.4<L>   |  18<L>  |  0.52    Ca    8.4      02 Oct 2019 02:01  Phos  2.4     10-02  Mg     1.9     10-    TPro  5.8<L>  /  Alb  3.4  /  TBili  0.5  /  DBili  x   /  AST  33  /  ALT  62<H>  /  AlkPhos  58  10-02    LIVER FUNCTIONS - ( 02 Oct 2019 02:01 )  Alb: 3.4 g/dL / Pro: 5.8 g/dL / ALK PHOS: 58 U/L / ALT: 62 U/L / AST: 33 U/L / GGT: x           Creatinine Trend: 0.52<--, 0.54<--, 0.53<--, 0.60<--, 0.64<--, 0.63<--    Urinalysis Basic - ( 30 Sep 2019 18:58 )    Color: Light Yellow / Appearance: Clear / S.032 / pH: x  Gluc: x / Ketone: Large  / Bili: Negative / Urobili: Negative   Blood: x / Protein: 30 mg/dL / Nitrite: Negative   Leuk Esterase: Negative / RBC: 3 /hpf / WBC 1 /HPF   Sq Epi: x / Non Sq Epi: 1 / Bacteria: Negative      Venous Blood Gas:  10-01 @ 18:31  7.39//71/18/96  VBG Lactate: 0.6  Venous Blood Gas:  10-01 @ 07:06  7.32//70//95  VBG Lactate: 0.8  Venous Blood Gas:  10-01 @ 02:41  7.29/28/47//82  VBG Lactate: 1.0  Venous Blood Gas:   @ 22:43  7.26/30/27//50  VBG Lactate: 1.0  Venous Blood Gas:   @ 18:45  7.24/28/38//69  VBG Lactate: 1.4      MICROBIOLOGY:     RADIOLOGY:  10/1 Abdominal RUQ Ultrasound:  IMPRESSION:   Hepatomegaly, with diffuse hepatic steatosis.    EKG:    Nghia EID (ext [id number]) CHIEF COMPLAINT:  Patient is a 33y old  Male who presents with a chief complaint of abdominal pain (01 Oct 2019 14:00)    HPI:  Pt is a 34yo male with PMHx of morbid obesity (BMI 48.3) presenting to the ED  with abdominal pain x 2 weeks, increased polydypsia and polyuria for last few days (urinating up to 25 times a day). According to documentation, he was meeting for life insurance and had blood work performed with reported elevated LFTs. No known h/o diabetes. Reports intermittent abdominal discomfort in the lower abdomin and some pain in the RUQ. Pain is intermittent, may worsen with food and water (up to 7/10) and improves when patient is lying flat on the bed. Reports decreased appetite, fatigue, weight loss, and decreased bowel movements (once every 3 days and his norm is once a day) with constipation. Patient denies vomiting and diarrhea, no black or bloody stools.  In the ED, finger stick was 373. K+ 2.9, VBG showed pH 7.24, HCO3 8, AG 26, lactate 1.4. beta hydroxy butyrate >8.0. Patient was given 2L of LR IVF and transferred to the MICU for insulin gtt management of DKA.     Interval Events:  Pt remained on insulin protocol management with most recent labs 10/2 AM revealing pH of 7.39, AG 14, HCO3 18. In preparation to transition pt off insulin gtt, D5NS decreased to 75cc/hr, Lantus 25U ordered at 5 am with NPH 5U pre-meal.     REVIEW OF SYSTEMS:    CONSTITUTIONAL: No weakness, fevers or chills  EYES/ENT: No visual changes;  No vertigo or throat pain   NECK: No pain or stiffness  RESPIRATORY: No cough, wheezing, hemoptysis; No shortness of breath  CARDIOVASCULAR: No chest pain or palpitations  GASTROINTESTINAL: No abdominal or epigastric pain. No nausea, vomiting, or hematemesis; No diarrhea or constipation. No melena or hematochezia.  GENITOURINARY: No dysuria, frequency or hematuria  NEUROLOGICAL: No numbness or weakness  SKIN: No itching, rashes      OBJECTIVE:  ICU Vital Signs Last 24 Hrs  T(C): 36.8 (02 Oct 2019 04:00), Max: 37.3 (01 Oct 2019 18:00)  T(F): 98.2 (02 Oct 2019 04:00), Max: 99.2 (01 Oct 2019 18:00)  HR: 97 (02 Oct 2019 04:00) (80 - 106)  BP: 121/72 (02 Oct 2019 04:00) (95/53 - 130/74)  BP(mean): 91 (02 Oct 2019 04:00) (68 - 95)  ABP: --  ABP(mean): --  RR: 31 (02 Oct 2019 04:00) (18 - 38)  SpO2: 96% (02 Oct 2019 04:00) (94% - 97%)         @ :01  -  10-01 @ 07:00  --------------------------------------------------------  IN: 4620.9 mL / OUT: 2450 mL / NET: 2170.9 mL    10-01 @ 07:01  -  10-02 @ 05:36  --------------------------------------------------------  IN: 4477.9 mL / OUT: 2950 mL / NET: 1527.9 mL      CAPILLARY BLOOD GLUCOSE      POCT Blood Glucose.: 241 mg/dL (02 Oct 2019 05:28)      PHYSICAL EXAM:    PHYSICAL EXAM:  Neuro:  awake alert oriented x 3, speech clear coherent. makes needs known well and appropriately, follows commands well and appropriately. CN intact. GARCIA well 5/5. Pupils 3 mm reactive equal    Pulm:  utilizing 2 liters N/C breath sounds bilat, diminished in lower lung fields bases to 1/4 up. Clear throughout, able to take deep breaths spontaneously and upon command. SPO2 98%.      CV:  cardiac monitor sinus rhythm without ectopy, no murmurs appreciated , peripheral pulses palpable with radial 2+ bilat, dp/pt 1+/1+ bilat, digits warm to touch with good cap refill < 3 secs      GI/:  abd  soft  non distended non tender , + hypoactive bowel sounds. bladder non distended non palpable    Skin:  warm dry intact. without palpable nodes. without JVD appreciated      HOSPITAL MEDICATIONS:  MEDICATIONS  (STANDING):  chlorhexidine 4% Liquid 1 Application(s) Topical <User Schedule>  dextrose 5% + sodium chloride 0.45% with potassium chloride 20 mEq/L 1000 milliLiter(s) (75 mL/Hr) IV Continuous <Continuous>  docusate sodium 100 milliGRAM(s) Oral three times a day  influenza   Vaccine 0.5 milliLiter(s) IntraMuscular once  insulin glargine Injectable (LANTUS) 25 Unit(s) SubCutaneous once  insulin lispro (HumaLOG) corrective regimen sliding scale   SubCutaneous three times a day before meals  insulin lispro (HumaLOG) corrective regimen sliding scale   SubCutaneous at bedtime  insulin NPH human recombinant 5 Unit(s) SubCutaneous three times a day before meals  insulin regular Infusion 5 Unit(s)/Hr (5 mL/Hr) IV Continuous <Continuous>  potassium chloride  10 mEq/100 mL IVPB 10 milliEquivalent(s) IV Intermittent every 1 hour  senna 2 Tablet(s) Oral at bedtime    MEDICATIONS  (PRN):  bisacodyl Suppository 10 milliGRAM(s) Rectal once PRN Constipation      LABS:                        13.8   5.92  )-----------( 147      ( 02 Oct 2019 02:01 )             38.2     Hgb Trend: 13.8<--, 13.3<--, 14.3<--, 16.2<--  10-02    138  |  106  |  <4<L>  ----------------------------<  210<H>  3.4<L>   |  18<L>  |  0.52    Ca    8.4      02 Oct 2019 02:01  Phos  2.4     10-02  Mg     1.9     10-02    TPro  5.8<L>  /  Alb  3.4  /  TBili  0.5  /  DBili  x   /  AST  33  /  ALT  62<H>  /  AlkPhos  58  10-02    LIVER FUNCTIONS - ( 02 Oct 2019 02:01 )  Alb: 3.4 g/dL / Pro: 5.8 g/dL / ALK PHOS: 58 U/L / ALT: 62 U/L / AST: 33 U/L / GGT: x           Creatinine Trend: 0.52<--, 0.54<--, 0.53<--, 0.60<--, 0.64<--, 0.63<--    Urinalysis Basic - ( 30 Sep 2019 18:58 )    Color: Light Yellow / Appearance: Clear / S.032 / pH: x  Gluc: x / Ketone: Large  / Bili: Negative / Urobili: Negative   Blood: x / Protein: 30 mg/dL / Nitrite: Negative   Leuk Esterase: Negative / RBC: 3 /hpf / WBC 1 /HPF   Sq Epi: x / Non Sq Epi: 1 / Bacteria: Negative      Venous Blood Gas:  10-01 @ 18:31  7.39//71/18/96  VBG Lactate: 0.6  Venous Blood Gas:  10-01 @ 07:06  7.32//70//95  VBG Lactate: 0.8  Venous Blood Gas:  10-01 @ 02:41  7.29/28/47//82  VBG Lactate: 1.0  Venous Blood Gas:   @ 22:43  7.26/30/27//50  VBG Lactate: 1.0  Venous Blood Gas:   @ 18:45  7.24/28/38//69  VBG Lactate: 1.4      MICROBIOLOGY:     RADIOLOGY:  10/1 Abdominal RUQ Ultrasound:  IMPRESSION:   Hepatomegaly, with diffuse hepatic steatosis.    EKG:    Nghia EID (ext [id number]) CHIEF COMPLAINT:  Patient is a 33y old  Male who presents with a chief complaint of abdominal pain (01 Oct 2019 14:00)    HPI:  Pt is a 32yo male with PMHx of morbid obesity (BMI 48.3) presenting to the ED  with abdominal pain x 2 weeks, increased polydypsia and polyuria for last few days (urinating up to 25 times a day). According to documentation, he was meeting for life insurance and had blood work performed with reported elevated LFTs. No known h/o diabetes. Reports intermittent abdominal discomfort in the lower abdomin and some pain in the RUQ. Pain is intermittent, may worsen with food and water (up to 7/10) and improves when patient is lying flat on the bed. Reports decreased appetite, fatigue, weight loss, and decreased bowel movements (once every 3 days and his norm is once a day) with constipation. Patient denies vomiting and diarrhea, no black or bloody stools.  In the ED, finger stick was 373. K+ 2.9, VBG showed pH 7.24, HCO3 8, AG 26, lactate 1.4. beta hydroxy butyrate >8.0. Patient was given 2L of LR IVF and transferred to the MICU for insulin gtt management of DKA.     Interval Events:  Pt remained on insulin protocol management with most recent labs 10/2 AM revealing pH of 7.39, AG 14, HCO3 18. In preparation to transition pt off insulin gtt, D5NS decreased to 75cc/hr, Lantus 25U ordered at 5 am with NPH 5U pre-meal.     REVIEW OF SYSTEMS:    CONSTITUTIONAL: No weakness, fevers or chills  EYES/ENT: No visual changes;  No vertigo or throat pain   NECK: No pain or stiffness  RESPIRATORY: No cough, wheezing, hemoptysis; No shortness of breath  CARDIOVASCULAR: No chest pain or palpitations  GASTROINTESTINAL: No abdominal or epigastric pain. No nausea, vomiting, or hematemesis; No diarrhea or constipation. No melena or hematochezia.  GENITOURINARY: No dysuria, frequency or hematuria  NEUROLOGICAL: No numbness or weakness  SKIN: No itching, rashes      OBJECTIVE:  ICU Vital Signs Last 24 Hrs  T(C): 36.8 (02 Oct 2019 04:00), Max: 37.3 (01 Oct 2019 18:00)  T(F): 98.2 (02 Oct 2019 04:00), Max: 99.2 (01 Oct 2019 18:00)  HR: 97 (02 Oct 2019 04:00) (80 - 106)  BP: 121/72 (02 Oct 2019 04:00) (95/53 - 130/74)  BP(mean): 91 (02 Oct 2019 04:00) (68 - 95)  ABP: --  ABP(mean): --  RR: 31 (02 Oct 2019 04:00) (18 - 38)  SpO2: 96% (02 Oct 2019 04:00) (94% - 97%)         @ :01  -  10-01 @ 07:00  --------------------------------------------------------  IN: 4620.9 mL / OUT: 2450 mL / NET: 2170.9 mL    10-01 @ 07:01  -  10-02 @ 05:36  --------------------------------------------------------  IN: 4477.9 mL / OUT: 2950 mL / NET: 1527.9 mL      CAPILLARY BLOOD GLUCOSE      POCT Blood Glucose.: 241 mg/dL (02 Oct 2019 05:28)      PHYSICAL EXAM:    PHYSICAL EXAM:  Neuro:  awake alert oriented x 3, speech clear coherent. makes needs known well and appropriately, follows commands well and appropriately. CN intact. GARCIA well 5/5. Pupils 3 mm reactive equal    Pulm:  on RA, breath sounds bilat, diminished in lower lung fields bases to 1/4 up. Clear throughout, able to take deep breaths spontaneously and upon command. SPO2 98%.      CV:  cardiac monitor sinus rhythm without ectopy, no murmurs appreciated , peripheral pulses palpable with radial 2+ bilat, dp/pt 1+/1+ bilat, digits warm to touch with good cap refill < 3 secs      GI/:  abd  soft  non distended non tender , + hypoactive bowel sounds. bladder non distended non palpable    Skin:  warm dry intact. without palpable nodes. without JVD appreciated      HOSPITAL MEDICATIONS:  MEDICATIONS  (STANDING):  chlorhexidine 4% Liquid 1 Application(s) Topical <User Schedule>  dextrose 5% + sodium chloride 0.45% with potassium chloride 20 mEq/L 1000 milliLiter(s) (75 mL/Hr) IV Continuous <Continuous>  docusate sodium 100 milliGRAM(s) Oral three times a day  influenza   Vaccine 0.5 milliLiter(s) IntraMuscular once  insulin glargine Injectable (LANTUS) 25 Unit(s) SubCutaneous once  insulin lispro (HumaLOG) corrective regimen sliding scale   SubCutaneous three times a day before meals  insulin lispro (HumaLOG) corrective regimen sliding scale   SubCutaneous at bedtime  insulin NPH human recombinant 5 Unit(s) SubCutaneous three times a day before meals  insulin regular Infusion 5 Unit(s)/Hr (5 mL/Hr) IV Continuous <Continuous>  potassium chloride  10 mEq/100 mL IVPB 10 milliEquivalent(s) IV Intermittent every 1 hour  senna 2 Tablet(s) Oral at bedtime    MEDICATIONS  (PRN):  bisacodyl Suppository 10 milliGRAM(s) Rectal once PRN Constipation      LABS:                        13.8   5.92  )-----------( 147      ( 02 Oct 2019 02:01 )             38.2     Hgb Trend: 13.8<--, 13.3<--, 14.3<--, 16.2<--  10-02    138  |  106  |  <4<L>  ----------------------------<  210<H>  3.4<L>   |  18<L>  |  0.52    Ca    8.4      02 Oct 2019 02:01  Phos  2.4     10-02  Mg     1.9     10-    TPro  5.8<L>  /  Alb  3.4  /  TBili  0.5  /  DBili  x   /  AST  33  /  ALT  62<H>  /  AlkPhos  58  10-02    LIVER FUNCTIONS - ( 02 Oct 2019 02:01 )  Alb: 3.4 g/dL / Pro: 5.8 g/dL / ALK PHOS: 58 U/L / ALT: 62 U/L / AST: 33 U/L / GGT: x           Creatinine Trend: 0.52<--, 0.54<--, 0.53<--, 0.60<--, 0.64<--, 0.63<--    Urinalysis Basic - ( 30 Sep 2019 18:58 )    Color: Light Yellow / Appearance: Clear / S.032 / pH: x  Gluc: x / Ketone: Large  / Bili: Negative / Urobili: Negative   Blood: x / Protein: 30 mg/dL / Nitrite: Negative   Leuk Esterase: Negative / RBC: 3 /hpf / WBC 1 /HPF   Sq Epi: x / Non Sq Epi: 1 / Bacteria: Negative      Venous Blood Gas:  10-01 @ 18:31  7.39/31/71/18/96  VBG Lactate: 0.6  Venous Blood Gas:  10-01 @ 07:06  7.32//70//95  VBG Lactate: 0.8  Venous Blood Gas:  10-01 @ 02:41  7.29/28/47//82  VBG Lactate: 1.0  Venous Blood Gas:   @ 22:43  7.26/30/27//50  VBG Lactate: 1.0  Venous Blood Gas:   @ 18:45  7.24/28/38/11/69  VBG Lactate: 1.4      MICROBIOLOGY:     RADIOLOGY:  10/1 Abdominal RUQ Ultrasound:  IMPRESSION:   Hepatomegaly, with diffuse hepatic steatosis.    EKG:    [facility] EID (ext 1624) CHIEF COMPLAINT:  Patient is a 33y old  Male who presents with a chief complaint of abdominal pain (01 Oct 2019 14:00)    HPI:  Pt is a 34yo male with PMHx of morbid obesity (BMI 48.3) presenting to the ED  with abdominal pain x 2 weeks, increased polydypsia and polyuria for last few days (urinating up to 25 times a day). According to documentation, he was meeting for life insurance and had blood work performed with reported elevated LFTs. No known h/o diabetes. Reports intermittent abdominal discomfort in the lower abdomin and some pain in the RUQ. Pain is intermittent, may worsen with food and water (up to 7/10) and improves when patient is lying flat on the bed. Reports decreased appetite, fatigue, weight loss, and decreased bowel movements (once every 3 days and his norm is once a day) with constipation. Patient denies vomiting and diarrhea, no black or bloody stools.  In the ED, finger stick was 373. K+ 2.9, VBG showed pH 7.24, HCO3 8, AG 26, lactate 1.4. beta hydroxy butyrate >8.0. Patient was given 2L of LR IVF and transferred to the MICU for insulin gtt management of DKA.     Interval Events:  Pt remained on insulin protocol management with most recent labs 10/2 AM revealing pH of 7.39, AG 14, HCO3 18. In preparation to transition pt off insulin gtt, D5NS decreased to 75cc/hr, Lantus 25U ordered at 5 am with Humalog 5 TID with meals.    REVIEW OF SYSTEMS:    CONSTITUTIONAL: No weakness, fevers or chills  EYES/ENT: No visual changes;  No vertigo or throat pain   NECK: No pain or stiffness  RESPIRATORY: No cough, wheezing, hemoptysis; No shortness of breath  CARDIOVASCULAR: No chest pain or palpitations  GASTROINTESTINAL: No abdominal or epigastric pain. No nausea, vomiting, or hematemesis; No diarrhea or constipation. No melena or hematochezia.  GENITOURINARY: No dysuria, frequency or hematuria  NEUROLOGICAL: No numbness or weakness  SKIN: No itching, rashes      OBJECTIVE:  ICU Vital Signs Last 24 Hrs  T(C): 36.8 (02 Oct 2019 04:00), Max: 37.3 (01 Oct 2019 18:00)  T(F): 98.2 (02 Oct 2019 04:00), Max: 99.2 (01 Oct 2019 18:00)  HR: 97 (02 Oct 2019 04:00) (80 - 106)  BP: 121/72 (02 Oct 2019 04:00) (95/53 - 130/74)  BP(mean): 91 (02 Oct 2019 04:00) (68 - 95)  ABP: --  ABP(mean): --  RR: 31 (02 Oct 2019 04:00) (18 - 38)  SpO2: 96% (02 Oct 2019 04:00) (94% - 97%)         @ :01  -  10-01 @ 07:00  --------------------------------------------------------  IN: 4620.9 mL / OUT: 2450 mL / NET: 2170.9 mL    10-01 @ 07:01  -  10-02 @ 05:36  --------------------------------------------------------  IN: 4477.9 mL / OUT: 2950 mL / NET: 1527.9 mL      CAPILLARY BLOOD GLUCOSE      POCT Blood Glucose.: 241 mg/dL (02 Oct 2019 05:28)      PHYSICAL EXAM:    PHYSICAL EXAM:  Neuro:  awake alert oriented x 3, speech clear coherent. makes needs known well and appropriately, follows commands well and appropriately. CN intact. GARCIA well 5/5. Pupils 3 mm reactive equal    Pulm:  on RA, breath sounds bilat, diminished in lower lung fields bases to 1/4 up. Clear throughout, able to take deep breaths spontaneously and upon command. SPO2 98%.      CV:  cardiac monitor sinus rhythm without ectopy, no murmurs appreciated , peripheral pulses palpable with radial 2+ bilat, dp/pt 1+/1+ bilat, digits warm to touch with good cap refill < 3 secs      GI/:  abd  soft  non distended non tender , + hypoactive bowel sounds. bladder non distended non palpable    Skin:  warm dry intact. without palpable nodes. without JVD appreciated      HOSPITAL MEDICATIONS:  MEDICATIONS  (STANDING):  chlorhexidine 4% Liquid 1 Application(s) Topical <User Schedule>  dextrose 5% + sodium chloride 0.45% with potassium chloride 20 mEq/L 1000 milliLiter(s) (75 mL/Hr) IV Continuous <Continuous>  docusate sodium 100 milliGRAM(s) Oral three times a day  influenza   Vaccine 0.5 milliLiter(s) IntraMuscular once  insulin glargine Injectable (LANTUS) 25 Unit(s) SubCutaneous once  insulin lispro (HumaLOG) corrective regimen sliding scale   SubCutaneous three times a day before meals  insulin lispro (HumaLOG) corrective regimen sliding scale   SubCutaneous at bedtime  insulin NPH human recombinant 5 Unit(s) SubCutaneous three times a day before meals  insulin regular Infusion 5 Unit(s)/Hr (5 mL/Hr) IV Continuous <Continuous>  potassium chloride  10 mEq/100 mL IVPB 10 milliEquivalent(s) IV Intermittent every 1 hour  senna 2 Tablet(s) Oral at bedtime    MEDICATIONS  (PRN):  bisacodyl Suppository 10 milliGRAM(s) Rectal once PRN Constipation      LABS:                        13.8   5.92  )-----------( 147      ( 02 Oct 2019 02:01 )             38.2     Hgb Trend: 13.8<--, 13.3<--, 14.3<--, 16.2<--  10-02    138  |  106  |  <4<L>  ----------------------------<  210<H>  3.4<L>   |  18<L>  |  0.52    Ca    8.4      02 Oct 2019 02:01  Phos  2.4     10-02  Mg     1.9     10-    TPro  5.8<L>  /  Alb  3.4  /  TBili  0.5  /  DBili  x   /  AST  33  /  ALT  62<H>  /  AlkPhos  58  10-02    LIVER FUNCTIONS - ( 02 Oct 2019 02:01 )  Alb: 3.4 g/dL / Pro: 5.8 g/dL / ALK PHOS: 58 U/L / ALT: 62 U/L / AST: 33 U/L / GGT: x           Creatinine Trend: 0.52<--, 0.54<--, 0.53<--, 0.60<--, 0.64<--, 0.63<--    Urinalysis Basic - ( 30 Sep 2019 18:58 )    Color: Light Yellow / Appearance: Clear / S.032 / pH: x  Gluc: x / Ketone: Large  / Bili: Negative / Urobili: Negative   Blood: x / Protein: 30 mg/dL / Nitrite: Negative   Leuk Esterase: Negative / RBC: 3 /hpf / WBC 1 /HPF   Sq Epi: x / Non Sq Epi: 1 / Bacteria: Negative      Venous Blood Gas:  10-01 @ 18:31  7.39//71/18/96  VBG Lactate: 0.6  Venous Blood Gas:  10-01 @ 07:06  7.32//70//95  VBG Lactate: 0.8  Venous Blood Gas:  10-01 @ 02:41  7.29/28/47//82  VBG Lactate: 1.0  Venous Blood Gas:   @ 22:43  7.26/30/27//50  VBG Lactate: 1.0  Venous Blood Gas:   @ 18:45  7.24/28/38//69  VBG Lactate: 1.4      MICROBIOLOGY:     RADIOLOGY:  10/1 Abdominal RUQ Ultrasound:  IMPRESSION:   Hepatomegaly, with diffuse hepatic steatosis.    EKG:    [facility] EID (ext 1624) CHIEF COMPLAINT:  Patient is a 33y old  Male who presents with a chief complaint of abdominal pain (01 Oct 2019 14:00)    HPI:  Pt is a 34yo male with PMHx of morbid obesity (BMI 48.3) presenting to the ED  with abdominal pain x 2 weeks, increased polydypsia and polyuria for last few days (urinating up to 25 times a day). According to documentation, he was meeting for life insurance and had blood work performed with reported elevated LFTs. No known h/o diabetes. Reports intermittent abdominal discomfort in the lower abdomin and some pain in the RUQ. Pain is intermittent, may worsen with food and water (up to 7/10) and improves when patient is lying flat on the bed. Reports decreased appetite, fatigue, weight loss, and decreased bowel movements (once every 3 days and his norm is once a day) with constipation. Patient denies vomiting and diarrhea, no black or bloody stools.  In the ED, finger stick was 373. K+ 2.9, VBG showed pH 7.24, HCO3 8, AG 26, lactate 1.4. beta hydroxy butyrate >8.0. Patient was given 2L of LR IVF and transferred to the MICU for insulin gtt management of DKA.     Interval Events:  Pt remained on insulin protocol management with most recent labs 10/2 AM revealing pH of 7.39, AG 14, HCO3 18. In preparation to transition pt off insulin gtt, D5NS decreased to 75cc/hr, Lantus 25U ordered at 5 am, then an additional 15U with Humalog 13 TID with meals.    REVIEW OF SYSTEMS:    CONSTITUTIONAL: No weakness, fevers or chills  EYES/ENT: No visual changes;  No vertigo or throat pain   NECK: No pain or stiffness  RESPIRATORY: No cough, wheezing, hemoptysis; No shortness of breath  CARDIOVASCULAR: No chest pain or palpitations  GASTROINTESTINAL: No abdominal or epigastric pain. No nausea, vomiting, or hematemesis; No diarrhea or constipation. No melena or hematochezia.  GENITOURINARY: No dysuria, frequency or hematuria  NEUROLOGICAL: No numbness or weakness  SKIN: No itching, rashes      OBJECTIVE:  ICU Vital Signs Last 24 Hrs  T(C): 36.8 (02 Oct 2019 04:00), Max: 37.3 (01 Oct 2019 18:00)  T(F): 98.2 (02 Oct 2019 04:00), Max: 99.2 (01 Oct 2019 18:00)  HR: 97 (02 Oct 2019 04:00) (80 - 106)  BP: 121/72 (02 Oct 2019 04:00) (95/53 - 130/74)  BP(mean): 91 (02 Oct 2019 04:00) (68 - 95)  ABP: --  ABP(mean): --  RR: 31 (02 Oct 2019 04:00) (18 - 38)  SpO2: 96% (02 Oct 2019 04:00) (94% - 97%)         @ :01  -  10-01 @ 07:00  --------------------------------------------------------  IN: 4620.9 mL / OUT: 2450 mL / NET: 2170.9 mL    10-01 @ 07:01  -  10-02 @ 05:36  --------------------------------------------------------  IN: 4477.9 mL / OUT: 2950 mL / NET: 1527.9 mL      CAPILLARY BLOOD GLUCOSE      POCT Blood Glucose.: 241 mg/dL (02 Oct 2019 05:28)      PHYSICAL EXAM:    PHYSICAL EXAM:  Neuro:  awake alert oriented x 3, speech clear coherent. makes needs known well and appropriately, follows commands well and appropriately. CN intact. GARCIA well 5/5. Pupils 3 mm reactive equal    Pulm:  on RA, breath sounds bilat, diminished in lower lung fields bases to 1/4 up. Clear throughout, able to take deep breaths spontaneously and upon command. SPO2 98%.      CV:  cardiac monitor sinus rhythm without ectopy, no murmurs appreciated , peripheral pulses palpable with radial 2+ bilat, dp/pt 1+/1+ bilat, digits warm to touch with good cap refill < 3 secs      GI/:  abd  soft  non distended non tender , + hypoactive bowel sounds. bladder non distended non palpable    Skin:  warm dry intact. without palpable nodes. without JVD appreciated      HOSPITAL MEDICATIONS:  MEDICATIONS  (STANDING):  chlorhexidine 4% Liquid 1 Application(s) Topical <User Schedule>  dextrose 5% + sodium chloride 0.45% with potassium chloride 20 mEq/L 1000 milliLiter(s) (75 mL/Hr) IV Continuous <Continuous>  docusate sodium 100 milliGRAM(s) Oral three times a day  influenza   Vaccine 0.5 milliLiter(s) IntraMuscular once  insulin glargine Injectable (LANTUS) 25 Unit(s) SubCutaneous once  insulin lispro (HumaLOG) corrective regimen sliding scale   SubCutaneous three times a day before meals  insulin lispro (HumaLOG) corrective regimen sliding scale   SubCutaneous at bedtime  insulin NPH human recombinant 5 Unit(s) SubCutaneous three times a day before meals  insulin regular Infusion 5 Unit(s)/Hr (5 mL/Hr) IV Continuous <Continuous>  potassium chloride  10 mEq/100 mL IVPB 10 milliEquivalent(s) IV Intermittent every 1 hour  senna 2 Tablet(s) Oral at bedtime    MEDICATIONS  (PRN):  bisacodyl Suppository 10 milliGRAM(s) Rectal once PRN Constipation      LABS:                        13.8   5.92  )-----------( 147      ( 02 Oct 2019 02:01 )             38.2     Hgb Trend: 13.8<--, 13.3<--, 14.3<--, 16.2<--  10-02    138  |  106  |  <4<L>  ----------------------------<  210<H>  3.4<L>   |  18<L>  |  0.52    Ca    8.4      02 Oct 2019 02:01  Phos  2.4     10-02  Mg     1.9     10-    TPro  5.8<L>  /  Alb  3.4  /  TBili  0.5  /  DBili  x   /  AST  33  /  ALT  62<H>  /  AlkPhos  58  10-02    LIVER FUNCTIONS - ( 02 Oct 2019 02:01 )  Alb: 3.4 g/dL / Pro: 5.8 g/dL / ALK PHOS: 58 U/L / ALT: 62 U/L / AST: 33 U/L / GGT: x           Creatinine Trend: 0.52<--, 0.54<--, 0.53<--, 0.60<--, 0.64<--, 0.63<--    Urinalysis Basic - ( 30 Sep 2019 18:58 )    Color: Light Yellow / Appearance: Clear / S.032 / pH: x  Gluc: x / Ketone: Large  / Bili: Negative / Urobili: Negative   Blood: x / Protein: 30 mg/dL / Nitrite: Negative   Leuk Esterase: Negative / RBC: 3 /hpf / WBC 1 /HPF   Sq Epi: x / Non Sq Epi: 1 / Bacteria: Negative      Venous Blood Gas:  10-01 @ 18:31  7.39//71/18/96  VBG Lactate: 0.6  Venous Blood Gas:  10-01 @ 07:06  7.32//70//95  VBG Lactate: 0.8  Venous Blood Gas:  10-01 @ 02:41  7.29/28/47//82  VBG Lactate: 1.0  Venous Blood Gas:   @ 22:43  7.26/30///50  VBG Lactate: 1.0  Venous Blood Gas:   @ 18:45  7.24/28/38//69  VBG Lactate: 1.4      MICROBIOLOGY:     RADIOLOGY:  10/1 Abdominal RUQ Ultrasound:  IMPRESSION:   Hepatomegaly, with diffuse hepatic steatosis.    EKG:    [facility]ia EID (ext 1624)

## 2019-10-02 NOTE — PROGRESS NOTE ADULT - PROBLEM SELECTOR PLAN 1
- recommend increase Lantus to 48 units qAM  - increase Humalog to 16 units TID  - c/w moderate scale qac and qhs  - consistent carb diet  - will follow  - for discharge: as he has no insurance, dc on Novolin/Humulin 70/30, doses to be determined. Will need to follow up in the [facility] endocrine clinic - [phone] - recommend increase Lantus to 48 units qAM  - increase Humalog to 16 units TID  - c/w moderate scale qac and qhs  - consistent carb diet  - follow up HAM and islet cell Ab  - will follow  - for discharge: as he has no insurance, dc on Novolin/Humulin 70/30, doses to be determined. Will need to follow up in the [facility] endocrine clinic - [phone]

## 2019-10-02 NOTE — CHART NOTE - NSCHARTNOTEFT_GEN_A_CORE
Upon Nutritional Assessment by the Registered Dietitian your patient was determined to meet criteria / has evidence of the following diagnosis/diagnoses:          [ ]  Mild Protein Calorie Malnutrition        [ ]  Moderate Protein Calorie Malnutrition        [ ] Severe Protein Calorie Malnutrition        [ ] Unspecified Protein Calorie Malnutrition        [ ] Underweight / BMI <19        [x ] Morbid Obesity / BMI > 40      Findings as based on:  [x ] Comprehensive nutrition assessment   [ ] Nutrition Focused Physical Exam  [ ] Other:       Nutrition Plan/Recommendations: new DM, Type 2 DM and wt loss education provided, recommend continue Consistent Carbohydrate Diet        PROVIDER Section:     By signing this assessment you are acknowledging and agree with the diagnosis/diagnoses assigned by the Registered Dietitian    Comments:

## 2019-10-02 NOTE — CHART NOTE - NSCHARTNOTEFT_GEN_A_CORE
MICU Transfer Note    Transfer from: MICU    Transfer to: (  ) Medicine    (  ) Telemetry     (   ) RCU        (    ) Palliative         (   ) Stroke Unit          (   ) __________________    Accepting Physician:  Signout given to:     MICU COURSE:    HPI:  Pt is a 34yo male with PMHx of morbid obesity (BMI 48.3) presenting to the ED 9/30 with abdominal pain x 2 weeks, increased polydypsia and polyuria for last few days (urinating up to 25 times a day). According to documentation, he was meeting for life insurance and had blood work performed with reported elevated LFTs. No known h/o diabetes. Reports intermittent abdominal discomfort in the lower abdomin and some pain in the RUQ. Pain is intermittent, may worsen with food and water (up to 7/10) and improves when patient is lying flat on the bed. Reports decreased appetite, fatigue, weight loss, and decreased bowel movements (once every 3 days and his norm is once a day) with constipation. Patient denies vomiting and diarrhea, no black or bloody stools.  In the ED, finger stick was 373. K+ 2.9, VBG showed pH 7.24, HCO3 8, AG 26, lactate 1.4. beta hydroxy butyrate >8.0. Patient was given 2L of LR IVF and transferred to the MICU for insulin gtt management of DKA.     Interval Events:  Pt remained on insulin protocol management with most recent labs 10/2 AM revealing pH of 7.39, AG 14, HCO3 18. In preparation to transition pt off insulin gtt, D5NS decreased to 75cc/hr, Lantus 25U ordered at 5 am, then an additional 15U (as determined Lantus 25U insufficient) with Humalog 13 TID with meals. Pt seen AM 10/2, states he is feeling better without any complaints. Denies abdominal pain, n/v/d/c, chills, fever, SOB, chest pain, headache. Stable for transfer to the floors.       ASSESSMENT & PLAN:   Mr. Pearson is a pleasant 32 yo male with morbid obesity (BMI 48.3) presenting with abdominal pain, polyuria and polydipsia on 9/30, found to have DKA and new onset diabetes, now s/p transition off insulin gtt AM of 10/2.    #Neuro  -A&Ox4, no issues, will continue to monitor    #Endo  DKA, likely T2DM, given age and BMI. HbA1C of 11.3  - no clear as to etiology of exacerbate as of yet. Lipase negative, no diarrhea, no URI or PNA  - VBG showed pH 7.24, HCO3 8, AG 26, lactate 1.4. beta hydroxy butyrate >8. large ketones in the urine.   - K requiring continuous repletion, will monitor BMP q4h, replete K as needed  - s/p 4L LRs, and started on D5+NS at 150/hr now decreased to 75cc/hr and titrated off  - BMP q4h: Most recent labs revealing AG 16 -> 14 -> 14 ->17, HCO3 15 -> 17 -> 18 -> 15. pH 7.39  - Blood sugar remained high 100s - low 200s overnight (pt did admit to drinking tea/juice overnight while on D5NS at 150/hr)  - insulin gtt began at 8u, now at 5u and will transition with Lantus 25 U this AM however with persistently elevated FS/decreasing HCO3/increasing AG s/p Lantus - will give additional Lantus 15 U and Humalog 13 TID with meals and plan for Lantus 40 U q am.   - advance diet to regular (carb restricted)  - f/u Endo recommendations     #Resp  - Lungs clear to auscultation   - no active issues  - will continue to monitor SpO2    #CV  - no active cardiac issues at this time  - will continue to monitor   - cholesterol 160, HDL 23, LDL 77, .    #GI  Patient complained of symptoms of biliary colic upon presentation, likely due to biliary sludge or stones  - pt now asymptomatic, tolerating PO diet (will advance to carb-restricted regular diet) and having normal BMs   - lipase negative, ALT 76.   - RUQ US revealing hepatomegaly, diffuse hepatic steatosis   - will w/u hepatic pathology  Morbid Obesity with BMI 48.3  - nutrition counseling     #ID  - UA negative, no clear source of infection currently  - WBC WNL and afebrile     #Renal/  -polyuria likely secondary to T2DM  -will f/u BMP and monitor Is/Os    #Heme  - No active issues  - will continue to monitor CBC qd    #DVT PPx  -low risk for DVT, OOB to chair/encourage ambulation        FOR FOLLOW UP:  - Lantus 40 U in morning, Humalog 15 U pre-meal with Moderate ISS, f/u FS and adjust accordingly  - F/u Endocrinology recommendations   - Diabetic and Nutritional counseling, encourage ambulation   - f/u BMP and replete potassium MICU Transfer Note    Transfer from: MICU    Transfer to: ( X ) Medicine    (  ) Telemetry     (   ) RCU        (    ) Palliative         (   ) Stroke Unit          (   ) __________________    Accepting Physician: Dr. Shady Del Rio  Signout given to:     MICU COURSE:    HPI:  Pt is a 32yo male with PMHx of morbid obesity (BMI 48.3) presenting to the ED 9/30 with abdominal pain x 2 weeks, increased polydypsia and polyuria for last few days (urinating up to 25 times a day). According to documentation, he was meeting for life insurance and had blood work performed with reported elevated LFTs. No known h/o diabetes. Reports intermittent abdominal discomfort in the lower abdomin and some pain in the RUQ. Pain is intermittent, may worsen with food and water (up to 7/10) and improves when patient is lying flat on the bed. Reports decreased appetite, fatigue, weight loss, and decreased bowel movements (once every 3 days and his norm is once a day) with constipation. Patient denies vomiting and diarrhea, no black or bloody stools.  In the ED, finger stick was 373. K+ 2.9, VBG showed pH 7.24, HCO3 8, AG 26, lactate 1.4. beta hydroxy butyrate >8.0. Patient was given 2L of LR IVF and transferred to the MICU for insulin gtt management of DKA.     Interval Events:  Pt remained on insulin protocol management with most recent labs 10/2 AM revealing pH of 7.39, AG 14, HCO3 18. In preparation to transition pt off insulin gtt, D5NS decreased to 75cc/hr, Lantus 25U ordered at 5 am, then an additional 15U (as determined Lantus 25U insufficient) with Humalog 13 TID with meals. Pt seen AM 10/2, states he is feeling better without any complaints. Denies abdominal pain, n/v/d/c, chills, fever, SOB, chest pain, headache. Stable for transfer to the floors.       ASSESSMENT & PLAN:   Mr. Pearson is a pleasant 32 yo male with morbid obesity (BMI 48.3) presenting with abdominal pain, polyuria and polydipsia on 9/30, found to have DKA and new onset diabetes, now s/p transition off insulin gtt AM of 10/2.    #Neuro  -A&Ox4, no issues, will continue to monitor    #Endo  DKA, likely T2DM, given age and BMI. HbA1C of 11.3  - no clear as to etiology of exacerbate as of yet. Lipase negative, no diarrhea, no URI or PNA  - VBG showed pH 7.24, HCO3 8, AG 26, lactate 1.4. beta hydroxy butyrate >8. large ketones in the urine.   - K requiring continuous repletion, will monitor BMP q4h, replete K as needed  - s/p 4L LRs, and started on D5+NS at 150/hr now decreased to 75cc/hr and titrated off  - BMP q4h: Most recent labs revealing AG 16 -> 14 -> 14 ->17, HCO3 15 -> 17 -> 18 -> 15. pH 7.39  - Blood sugar remained high 100s - low 200s overnight (pt did admit to drinking tea/juice overnight while on D5NS at 150/hr)  - insulin gtt began at 8u, now at 5u and will transition with Lantus 25 U this AM however with persistently elevated FS/decreasing HCO3/increasing AG s/p Lantus - will give additional Lantus 15 U and Humalog 13 TID with meals and plan for Lantus 40 U q am.   - advance diet to regular (carb restricted)  - f/u Endo recommendations     #Resp  - Lungs clear to auscultation   - no active issues  - will continue to monitor SpO2    #CV  - no active cardiac issues at this time  - will continue to monitor   - cholesterol 160, HDL 23, LDL 77, .    #GI  Patient complained of symptoms of biliary colic upon presentation, likely due to biliary sludge or stones  - pt now asymptomatic, tolerating PO diet (will advance to carb-restricted regular diet) and having normal BMs   - lipase negative, ALT 76.   - RUQ US revealing hepatomegaly, diffuse hepatic steatosis   - will w/u hepatic pathology  Morbid Obesity with BMI 48.3  - nutrition counseling     #ID  - UA negative, no clear source of infection currently  - WBC WNL and afebrile     #Renal/  -polyuria likely secondary to T2DM  -will f/u BMP and monitor Is/Os    #Heme  - No active issues  - will continue to monitor CBC qd    #DVT PPx  -low risk for DVT, OOB to chair/encourage ambulation        FOR FOLLOW UP:  - Lantus 40 U in morning, Humalog 15 U pre-meal with Moderate ISS, f/u FS and adjust accordingly  - F/u Endocrinology recommendations   - Diabetic and Nutritional counseling, encourage ambulation   - f/u BMP and replete potassium

## 2019-10-03 DIAGNOSIS — K59.00 CONSTIPATION, UNSPECIFIED: ICD-10-CM

## 2019-10-03 DIAGNOSIS — E66.01 MORBID (SEVERE) OBESITY DUE TO EXCESS CALORIES: ICD-10-CM

## 2019-10-03 DIAGNOSIS — K80.50 CALCULUS OF BILE DUCT WITHOUT CHOLANGITIS OR CHOLECYSTITIS WITHOUT OBSTRUCTION: ICD-10-CM

## 2019-10-03 DIAGNOSIS — Z29.9 ENCOUNTER FOR PROPHYLACTIC MEASURES, UNSPECIFIED: ICD-10-CM

## 2019-10-03 DIAGNOSIS — E11.10 TYPE 2 DIABETES MELLITUS WITH KETOACIDOSIS WITHOUT COMA: ICD-10-CM

## 2019-10-03 DIAGNOSIS — R35.8 OTHER POLYURIA: ICD-10-CM

## 2019-10-03 LAB
ALBUMIN SERPL ELPH-MCNC: 3.9 G/DL — SIGNIFICANT CHANGE UP (ref 3.3–5)
ALP SERPL-CCNC: 63 U/L — SIGNIFICANT CHANGE UP (ref 40–120)
ALT FLD-CCNC: 57 U/L — HIGH (ref 10–45)
ANION GAP SERPL CALC-SCNC: 17 MMOL/L — SIGNIFICANT CHANGE UP (ref 5–17)
ANION GAP SERPL CALC-SCNC: 18 MMOL/L — HIGH (ref 5–17)
ANION GAP SERPL CALC-SCNC: 19 MMOL/L — HIGH (ref 5–17)
AST SERPL-CCNC: 29 U/L — SIGNIFICANT CHANGE UP (ref 10–40)
BILIRUB SERPL-MCNC: 0.8 MG/DL — SIGNIFICANT CHANGE UP (ref 0.2–1.2)
BUN SERPL-MCNC: 5 MG/DL — LOW (ref 7–23)
BUN SERPL-MCNC: 5 MG/DL — LOW (ref 7–23)
BUN SERPL-MCNC: 7 MG/DL — SIGNIFICANT CHANGE UP (ref 7–23)
CALCIUM SERPL-MCNC: 8.9 MG/DL — SIGNIFICANT CHANGE UP (ref 8.4–10.5)
CALCIUM SERPL-MCNC: 9 MG/DL — SIGNIFICANT CHANGE UP (ref 8.4–10.5)
CALCIUM SERPL-MCNC: 9.1 MG/DL — SIGNIFICANT CHANGE UP (ref 8.4–10.5)
CHLORIDE SERPL-SCNC: 101 MMOL/L — SIGNIFICANT CHANGE UP (ref 96–108)
CHLORIDE SERPL-SCNC: 102 MMOL/L — SIGNIFICANT CHANGE UP (ref 96–108)
CHLORIDE SERPL-SCNC: 103 MMOL/L — SIGNIFICANT CHANGE UP (ref 96–108)
CO2 SERPL-SCNC: 17 MMOL/L — LOW (ref 22–31)
CO2 SERPL-SCNC: 18 MMOL/L — LOW (ref 22–31)
CO2 SERPL-SCNC: 22 MMOL/L — SIGNIFICANT CHANGE UP (ref 22–31)
CREAT SERPL-MCNC: 0.61 MG/DL — SIGNIFICANT CHANGE UP (ref 0.5–1.3)
CREAT SERPL-MCNC: 0.63 MG/DL — SIGNIFICANT CHANGE UP (ref 0.5–1.3)
CREAT SERPL-MCNC: 0.67 MG/DL — SIGNIFICANT CHANGE UP (ref 0.5–1.3)
GLUCOSE BLDC GLUCOMTR-MCNC: 220 MG/DL — HIGH (ref 70–99)
GLUCOSE BLDC GLUCOMTR-MCNC: 224 MG/DL — HIGH (ref 70–99)
GLUCOSE BLDC GLUCOMTR-MCNC: 234 MG/DL — HIGH (ref 70–99)
GLUCOSE BLDC GLUCOMTR-MCNC: 239 MG/DL — HIGH (ref 70–99)
GLUCOSE BLDC GLUCOMTR-MCNC: 257 MG/DL — HIGH (ref 70–99)
GLUCOSE BLDC GLUCOMTR-MCNC: 269 MG/DL — HIGH (ref 70–99)
GLUCOSE SERPL-MCNC: 263 MG/DL — HIGH (ref 70–99)
GLUCOSE SERPL-MCNC: 278 MG/DL — HIGH (ref 70–99)
GLUCOSE SERPL-MCNC: 313 MG/DL — HIGH (ref 70–99)
HCT VFR BLD CALC: 39.6 % — SIGNIFICANT CHANGE UP (ref 39–50)
HGB BLD-MCNC: 14.2 G/DL — SIGNIFICANT CHANGE UP (ref 13–17)
MAGNESIUM SERPL-MCNC: 1.7 MG/DL — SIGNIFICANT CHANGE UP (ref 1.6–2.6)
MCHC RBC-ENTMCNC: 30.3 PG — SIGNIFICANT CHANGE UP (ref 27–34)
MCHC RBC-ENTMCNC: 35.9 GM/DL — SIGNIFICANT CHANGE UP (ref 32–36)
MCV RBC AUTO: 84.6 FL — SIGNIFICANT CHANGE UP (ref 80–100)
NRBC # BLD: 0 /100 WBCS — SIGNIFICANT CHANGE UP (ref 0–0)
PHOSPHATE SERPL-MCNC: 3.2 MG/DL — SIGNIFICANT CHANGE UP (ref 2.5–4.5)
PLATELET # BLD AUTO: 157 K/UL — SIGNIFICANT CHANGE UP (ref 150–400)
POTASSIUM SERPL-MCNC: 3.4 MMOL/L — LOW (ref 3.5–5.3)
POTASSIUM SERPL-MCNC: 3.6 MMOL/L — SIGNIFICANT CHANGE UP (ref 3.5–5.3)
POTASSIUM SERPL-MCNC: 4 MMOL/L — SIGNIFICANT CHANGE UP (ref 3.5–5.3)
POTASSIUM SERPL-SCNC: 3.4 MMOL/L — LOW (ref 3.5–5.3)
POTASSIUM SERPL-SCNC: 3.6 MMOL/L — SIGNIFICANT CHANGE UP (ref 3.5–5.3)
POTASSIUM SERPL-SCNC: 4 MMOL/L — SIGNIFICANT CHANGE UP (ref 3.5–5.3)
PROT SERPL-MCNC: 6.2 G/DL — SIGNIFICANT CHANGE UP (ref 6–8.3)
RBC # BLD: 4.68 M/UL — SIGNIFICANT CHANGE UP (ref 4.2–5.8)
RBC # FLD: 13.4 % — SIGNIFICANT CHANGE UP (ref 10.3–14.5)
SODIUM SERPL-SCNC: 138 MMOL/L — SIGNIFICANT CHANGE UP (ref 135–145)
SODIUM SERPL-SCNC: 139 MMOL/L — SIGNIFICANT CHANGE UP (ref 135–145)
SODIUM SERPL-SCNC: 140 MMOL/L — SIGNIFICANT CHANGE UP (ref 135–145)
WBC # BLD: 5.7 K/UL — SIGNIFICANT CHANGE UP (ref 3.8–10.5)
WBC # FLD AUTO: 5.7 K/UL — SIGNIFICANT CHANGE UP (ref 3.8–10.5)

## 2019-10-03 PROCEDURE — 99232 SBSQ HOSP IP/OBS MODERATE 35: CPT

## 2019-10-03 PROCEDURE — 99233 SBSQ HOSP IP/OBS HIGH 50: CPT | Mod: GC

## 2019-10-03 RX ORDER — POLYETHYLENE GLYCOL 3350 17 G/17G
17 POWDER, FOR SOLUTION ORAL ONCE
Refills: 0 | Status: COMPLETED | OUTPATIENT
Start: 2019-10-03 | End: 2019-10-03

## 2019-10-03 RX ORDER — INSULIN LISPRO 100/ML
19 VIAL (ML) SUBCUTANEOUS
Refills: 0 | Status: DISCONTINUED | OUTPATIENT
Start: 2019-10-03 | End: 2019-10-04

## 2019-10-03 RX ORDER — INSULIN GLARGINE 100 [IU]/ML
50 INJECTION, SOLUTION SUBCUTANEOUS EVERY MORNING
Refills: 0 | Status: DISCONTINUED | OUTPATIENT
Start: 2019-10-03 | End: 2019-10-04

## 2019-10-03 RX ORDER — POTASSIUM CHLORIDE 20 MEQ
40 PACKET (EA) ORAL ONCE
Refills: 0 | Status: COMPLETED | OUTPATIENT
Start: 2019-10-03 | End: 2019-10-03

## 2019-10-03 RX ADMIN — Medication 100 MILLIGRAM(S): at 22:51

## 2019-10-03 RX ADMIN — Medication 100 MILLIGRAM(S): at 06:51

## 2019-10-03 RX ADMIN — Medication 19 UNIT(S): at 18:12

## 2019-10-03 RX ADMIN — INSULIN GLARGINE 48 UNIT(S): 100 INJECTION, SOLUTION SUBCUTANEOUS at 10:48

## 2019-10-03 RX ADMIN — Medication 20 MILLIEQUIVALENT(S): at 12:22

## 2019-10-03 RX ADMIN — Medication 40 MILLIEQUIVALENT(S): at 09:25

## 2019-10-03 RX ADMIN — Medication 2: at 22:51

## 2019-10-03 RX ADMIN — Medication 6: at 12:18

## 2019-10-03 RX ADMIN — Medication 16 UNIT(S): at 12:19

## 2019-10-03 RX ADMIN — Medication 4: at 18:12

## 2019-10-03 RX ADMIN — Medication 6: at 10:50

## 2019-10-03 RX ADMIN — Medication 16 UNIT(S): at 10:50

## 2019-10-03 RX ADMIN — SENNA PLUS 2 TABLET(S): 8.6 TABLET ORAL at 22:51

## 2019-10-03 RX ADMIN — POLYETHYLENE GLYCOL 3350 17 GRAM(S): 17 POWDER, FOR SOLUTION ORAL at 09:34

## 2019-10-03 RX ADMIN — Medication 100 MILLIGRAM(S): at 14:00

## 2019-10-03 NOTE — PROGRESS NOTE ADULT - ASSESSMENT
33M h/o morbid obesity (BMI 48.3) presenting with abdominal pain, polyuria and polydipsia on 9/30, found to have DKA and new onset diabetes, now s/p transition off insulin gtt AM of 10/2.

## 2019-10-03 NOTE — PROGRESS NOTE ADULT - ASSESSMENT
33 yr M with morbid obesity here with DKA and newly diagnosed with diabetes (likely Type 2 given morbid obesity).     #1 Type 2 diabetes mellitus with ketoacidosis without coma, without long-term current use of insulin.    Plan:   - recommend increase Lantus to 50 units qAM  - increase Humalog to 19 units TID  - c/w moderate scale qac and qhs  - consistent carb diet  - follow up HAM and islet cell Ab  - will follow    - for discharge:   Might be getting insurance as per patient.   Recommend basal bolus. does to be determined.    If he has no insurance, dc on Novolin/Humulin 70/30, doses to be determined. Will need to follow up in the [facility] endocrine clinic - [phone].    To prepare for dc:  - Make sure patient knows how to inject insulin and check fingersticks with glucometer (ask bedside RN for teaching)  - Discharge on premeal insulin and Lantus. do not dc on correction scale or bedtime scale.  - At home, Patient should check FSBG premeals and bedtime. Pt should call their doctor when FSBG <70 or above >400 and or consistently above 200s as changes in the regimen will have to be made.  -pt should call PMD for DM related questions or concerns until pt is seen by CDE or endocrine       DISCHARGE RX:  - Glucometer (ACCU_CHECK jessica Conenct, Ascensia Contour Next EZ or One, Freestyle Freedome LITE or OneTouch Verio IQ)  - Glucometer test strips and lancets  (make sure compatible w glucometer), Dispense #100 (or #200) use as directed  - Novolog Flexpen (or Humalog Kwikpen) up to 20 units tid with meals , dispense 30ml  - Lantus Solostar Pen (or Basaglar Kwikpen) 50 units before bedtime, dispense 30ml   - BD soledad 4mm pen needles  -------  If he has no insurance, would recommend Humalin 70/30 or Novolin 70/30, dosage to be determined.

## 2019-10-03 NOTE — PROGRESS NOTE ADULT - PROBLEM SELECTOR PLAN 1
Likely T2DM, given age and BMI. HbA1c of 11.3  - No clear as to etiology of exacerbate as of yet. Lipase negative, no diarrhea, no URI or PNA  - VBG showed pH 7.24, HCO3 8, AG 26, lactate 1.4. beta hydroxy butyrate >8. large ketones in the urine.   - Blood sugar remained high 100s - low 200s overnight (pt did admit to drinking tea/juice overnight while on D5NS at 150/hr)  - Appreciate endo recs; inc Lantus 48U in the AM and 16U Humalog TID, c/w mod scale SSI qac and qhs   - CC diet  - Monitor BMP and replete K+ PRN Likely T2DM, given age and BMI. HbA1c of 11.3  - No clear as to etiology of exacerbate as of yet. Lipase negative, no diarrhea, no URI or PNA  - VBG showed pH 7.24, HCO3 8, AG 26, lactate 1.4. beta hydroxy butyrate >8. large ketones in the urine.   - Blood sugar remained high 100s - low 200s overnight (pt did admit to drinking tea/juice overnight while on D5NS at 150/hr)  - Appreciate endo recs; inc Lantus 48U in the AM and 16U Humalog TID, c/w mod scale SSI qac and qhs  - Will transition to 70/30 given that pt does not have insurance   - CC diet  - AG 18 <-- 16 <-- 21. Monitor BMP q6h  - Replete K+ PRN

## 2019-10-03 NOTE — PROGRESS NOTE ADULT - PROBLEM SELECTOR PLAN 2
Patient complained of symptoms of biliary colic upon presentation, likely due to biliary sludge or stones  - pt now asymptomatic, tolerating PO diet (will advance to carb-restricted regular diet)  - lipase negative, ALT 76.   - RUQ US revealing hepatomegaly, diffuse hepatic steatosis   - will w/u hepatic pathology

## 2019-10-03 NOTE — PROGRESS NOTE ADULT - PROBLEM SELECTOR PLAN 5
Reports constipation; last BM 4 days ago.  - c/w senna and colace Reports constipation; last BM 4 days ago.  - c/w senna and colace  - will try Miralax; considering Dulcolax, enemas, and lactulose

## 2019-10-03 NOTE — PROGRESS NOTE ADULT - PROBLEM SELECTOR PLAN 6
DVT ppx: low risk, encourage ambulation  Diet: CC    Fidel Kinney MD  Internal Medicine PGY-1  [phone] / [phone].

## 2019-10-03 NOTE — PROGRESS NOTE ADULT - PROBLEM SELECTOR PLAN 3
Likely secondary to T2DM  - Monitor BMP Likely secondary to T2DM.  - No longer reporting polyuria  - Monitoring BMP q6h for anion gap

## 2019-10-03 NOTE — PROGRESS NOTE ADULT - SUBJECTIVE AND OBJECTIVE BOX
Patient is a 33y old Male admitted for abdominal pain (02 Oct 2019 15:44)    SUBJECTIVE / OVERNIGHT EVENTS:    REVIEW OF SYSTEMS:    CONSTITUTIONAL: No weakness, fevers or chills  EYES/ENT: No visual changes;  No vertigo or throat pain   NECK: No pain or stiffness  RESPIRATORY: No cough, wheezing, hemoptysis; No shortness of breath  CARDIOVASCULAR: No chest pain or palpitations  GASTROINTESTINAL: No abdominal or epigastric pain. No nausea, vomiting, or hematemesis; No diarrhea or constipation. No melena or hematochezia.  GENITOURINARY: No dysuria, frequency or hematuria  NEUROLOGICAL: No numbness or weakness  SKIN: No itching, burning, rashes, or lesions   All other review of systems is negative unless indicated above.    MEDICATIONS  (STANDING):  dextrose 5%. 1000 milliLiter(s) (50 mL/Hr) IV Continuous <Continuous>  dextrose 50% Injectable 12.5 Gram(s) IV Push once  dextrose 50% Injectable 25 Gram(s) IV Push once  dextrose 50% Injectable 25 Gram(s) IV Push once  docusate sodium 100 milliGRAM(s) Oral three times a day  influenza   Vaccine 0.5 milliLiter(s) IntraMuscular once  insulin glargine Injectable (LANTUS) 48 Unit(s) SubCutaneous every morning  insulin lispro (HumaLOG) corrective regimen sliding scale   SubCutaneous three times a day before meals  insulin lispro (HumaLOG) corrective regimen sliding scale   SubCutaneous at bedtime  insulin lispro Injectable (HumaLOG) 16 Unit(s) SubCutaneous three times a day before meals  potassium chloride    Tablet ER 20 milliEquivalent(s) Oral daily  potassium chloride    Tablet ER 40 milliEquivalent(s) Oral once  senna 2 Tablet(s) Oral at bedtime    MEDICATIONS  (PRN):  bisacodyl Suppository 10 milliGRAM(s) Rectal once PRN Constipation  dextrose 40% Gel 15 Gram(s) Oral once PRN Blood Glucose LESS THAN 70 milliGRAM(s)/deciLiter  glucagon  Injectable 1 milliGRAM(s) IntraMuscular once PRN Glucose <70 milliGRAM(s)/deciLiter      Allergies    No Known Allergies    Intolerances        OBJECTIVE:    Vital Signs Last 24 Hrs  T(C): 36.8 (03 Oct 2019 05:05), Max: 36.8 (02 Oct 2019 08:00)  T(F): 98.2 (03 Oct 2019 05:05), Max: 98.3 (02 Oct 2019 08:00)  HR: 97 (03 Oct 2019 05:05) (89 - 101)  BP: 97/62 (03 Oct 2019 05:05) (97/62 - 128/61)  BP(mean): 86 (02 Oct 2019 15:00) (82 - 89)  RR: 18 (03 Oct 2019 05:05) (18 - 31)  SpO2: 96% (03 Oct 2019 05:05) (93% - 97%)  CAPILLARY BLOOD GLUCOSE      POCT Blood Glucose.: 262 mg/dL (02 Oct 2019 23:06)  POCT Blood Glucose.: 236 mg/dL (02 Oct 2019 18:34)  POCT Blood Glucose.: 246 mg/dL (02 Oct 2019 12:06)  POCT Blood Glucose.: 207 mg/dL (02 Oct 2019 08:01)    I&O's Summary    02 Oct 2019 07:01  -  03 Oct 2019 07:00  --------------------------------------------------------  IN: 1205 mL / OUT: 600 mL / NET: 605 mL          PHYSICAL EXAM:  GENERAL: No acute distress, well-developed  HEAD: atraumatic, normocephalic  EYES: EOMI, PERRLA, conjunctiva and sclera clear  NECK: Supple, no lymphadenopathy, no JVD  CHEST/LUNG: CTAB; No wheezing, rales, or rhonchi  HEART: RRR; Normal S1/S2. No murmurs, rubs, or gallops  ABDOMEN: Soft, non-tender, non-distended; normal bowel sounds, no organomegaly  EXTREMITIES:  2+ peripheral pulses b/l, No clubbing, cyanosis, or edema  NEUROLOGY: A&O x3,  no focal deficits  SKIN: Warm, dry, intact; No rashes or lesions      LABS:                        14.2   5.70  )-----------( 157      ( 03 Oct 2019 06:24 )             39.6     10-03    138  |  102  |  5<L>  ----------------------------<  278<H>  3.4<L>   |  18<L>  |  0.63    Ca    8.9      03 Oct 2019 06:24  Phos  3.2     10-03  Mg     1.7     10-03    TPro  6.2  /  Alb  3.9  /  TBili  0.8  /  DBili  x   /  AST  29  /  ALT  57<H>  /  AlkPhos  63  10-03    LIVER FUNCTIONS - ( 03 Oct 2019 06:24 )  Alb: 3.9 g/dL / Pro: 6.2 g/dL / ALK PHOS: 63 U/L / ALT: 57 U/L / AST: 29 U/L / GGT: x                       Culture - Urine (collected 30 Sep 2019 23:10)  Source: .Urine  Final Report (01 Oct 2019 18:32):    <10,000 CFU/mL Normal Urogenital Jeanie          RADIOLOGY & ADDITIONAL TESTS:    Imaging Personally Reviewed:     Consultant(s) Notes Reviewed:     Care Discussed with Consultants/Other Providers: Patient is a 33y old Male admitted for abdominal pain (02 Oct 2019 15:44)    SUBJECTIVE / OVERNIGHT EVENTS:  No acute events overnight. His last bowel movement was 5 days ago, and senna/colace has not helped. He notes some abd pain just below the umbilicus. Currently denies nausea, vomiting, and dyspnea.    REVIEW OF SYSTEMS:    CONSTITUTIONAL: No weakness, fevers or chills  EYES/ENT: No visual changes;  No vertigo or throat pain   NECK: No pain or stiffness  RESPIRATORY: No cough, wheezing, hemoptysis; No shortness of breath  CARDIOVASCULAR: No chest pain or palpitations  GASTROINTESTINAL: +abd pain just inferior to umbilicus. No epigastric pain. No nausea, vomiting, or hematemesis; No diarrhea or constipation. No melena or hematochezia.  GENITOURINARY: No dysuria, frequency or hematuria  NEUROLOGICAL: No numbness or weakness  SKIN: No itching, burning, rashes, or lesions   All other review of systems is negative unless indicated above.    MEDICATIONS  (STANDING):  dextrose 5%. 1000 milliLiter(s) (50 mL/Hr) IV Continuous <Continuous>  dextrose 50% Injectable 12.5 Gram(s) IV Push once  dextrose 50% Injectable 25 Gram(s) IV Push once  dextrose 50% Injectable 25 Gram(s) IV Push once  docusate sodium 100 milliGRAM(s) Oral three times a day  influenza   Vaccine 0.5 milliLiter(s) IntraMuscular once  insulin glargine Injectable (LANTUS) 48 Unit(s) SubCutaneous every morning  insulin lispro (HumaLOG) corrective regimen sliding scale   SubCutaneous three times a day before meals  insulin lispro (HumaLOG) corrective regimen sliding scale   SubCutaneous at bedtime  insulin lispro Injectable (HumaLOG) 16 Unit(s) SubCutaneous three times a day before meals  potassium chloride    Tablet ER 20 milliEquivalent(s) Oral daily  potassium chloride    Tablet ER 40 milliEquivalent(s) Oral once  senna 2 Tablet(s) Oral at bedtime    MEDICATIONS  (PRN):  bisacodyl Suppository 10 milliGRAM(s) Rectal once PRN Constipation  dextrose 40% Gel 15 Gram(s) Oral once PRN Blood Glucose LESS THAN 70 milliGRAM(s)/deciLiter  glucagon  Injectable 1 milliGRAM(s) IntraMuscular once PRN Glucose <70 milliGRAM(s)/deciLiter      Allergies  No Known Allergies      OBJECTIVE:    Vital Signs Last 24 Hrs  T(C): 36.8 (03 Oct 2019 05:05), Max: 36.8 (02 Oct 2019 08:00)  T(F): 98.2 (03 Oct 2019 05:05), Max: 98.3 (02 Oct 2019 08:00)  HR: 97 (03 Oct 2019 05:05) (89 - 101)  BP: 97/62 (03 Oct 2019 05:05) (97/62 - 128/61)  BP(mean): 86 (02 Oct 2019 15:00) (82 - 89)  RR: 18 (03 Oct 2019 05:05) (18 - 31)  SpO2: 96% (03 Oct 2019 05:05) (93% - 97%)  CAPILLARY BLOOD GLUCOSE      POCT Blood Glucose.: 262 mg/dL (02 Oct 2019 23:06)  POCT Blood Glucose.: 236 mg/dL (02 Oct 2019 18:34)  POCT Blood Glucose.: 246 mg/dL (02 Oct 2019 12:06)  POCT Blood Glucose.: 207 mg/dL (02 Oct 2019 08:01)    I&O's Summary    02 Oct 2019 07:01  -  03 Oct 2019 07:00  --------------------------------------------------------  IN: 1205 mL / OUT: 600 mL / NET: 605 mL      PHYSICAL EXAM:  GENERAL: No acute distress, well-developed  HEAD: atraumatic, normocephalic  EYES: EOMI, PERRLA, conjunctiva and sclera clear  NECK: Supple, no lymphadenopathy, no JVD  CHEST/LUNG: CTAB; No wheezing, rales, or rhonchi  HEART: RRR; Normal S1/S2. No murmurs, rubs, or gallops  ABDOMEN: Soft, non-tender, non-distended; normal bowel sounds x4, no organomegaly  EXTREMITIES:  2+ peripheral pulses b/l, No clubbing, cyanosis, or edema  NEUROLOGY: A&O x3, no focal deficits  SKIN: Warm, dry, intact; No rashes or lesions      LABS:                        14.2   5.70  )-----------( 157      ( 03 Oct 2019 06:24 )             39.6     10-03    138  |  102  |  5<L>  ----------------------------<  278<H>  3.4<L>   |  18<L>  |  0.63    Ca    8.9      03 Oct 2019 06:24  Phos  3.2     10-03  Mg     1.7     10-03    TPro  6.2  /  Alb  3.9  /  TBili  0.8  /  DBili  x   /  AST  29  /  ALT  57<H>  /  AlkPhos  63  10-03    LIVER FUNCTIONS - ( 03 Oct 2019 06:24 )  Alb: 3.9 g/dL / Pro: 6.2 g/dL / ALK PHOS: 63 U/L / ALT: 57 U/L / AST: 29 U/L / GGT: x             Culture - Urine (collected 30 Sep 2019 23:10)  Source: .Urine  Final Report (01 Oct 2019 18:32):    <10,000 CFU/mL Normal Urogenital Jeanie      RADIOLOGY & ADDITIONAL TESTS:    Imaging Personally Reviewed:     Consultant(s) Notes Reviewed:     Care Discussed with Consultants/Other Providers:

## 2019-10-03 NOTE — PROGRESS NOTE ADULT - SUBJECTIVE AND OBJECTIVE BOX
Contact info:   Michael Driver MD   [phone]    Subjective:   Patient is sitting in chair. No acute event overnight.   He is walking around, may be discharge tomorrow  Appetite is good, his glucose has been trending in high 200s.      MEDICATIONS  (STANDING):  dextrose 5%. 1000 milliLiter(s) (50 mL/Hr) IV Continuous <Continuous>  dextrose 50% Injectable 12.5 Gram(s) IV Push once  dextrose 50% Injectable 25 Gram(s) IV Push once  dextrose 50% Injectable 25 Gram(s) IV Push once  docusate sodium 100 milliGRAM(s) Oral three times a day  influenza   Vaccine 0.5 milliLiter(s) IntraMuscular once  insulin glargine Injectable (LANTUS) 48 Unit(s) SubCutaneous every morning  insulin lispro (HumaLOG) corrective regimen sliding scale   SubCutaneous three times a day before meals  insulin lispro (HumaLOG) corrective regimen sliding scale   SubCutaneous at bedtime  insulin lispro Injectable (HumaLOG) 16 Unit(s) SubCutaneous three times a day before meals  potassium chloride    Tablet ER 20 milliEquivalent(s) Oral daily  senna 2 Tablet(s) Oral at bedtime    MEDICATIONS  (PRN):  bisacodyl Suppository 10 milliGRAM(s) Rectal once PRN Constipation  dextrose 40% Gel 15 Gram(s) Oral once PRN Blood Glucose LESS THAN 70 milliGRAM(s)/deciLiter  glucagon  Injectable 1 milliGRAM(s) IntraMuscular once PRN Glucose <70 milliGRAM(s)/deciLiter      Allergies    No Known Allergies    Review of Systems:  Constitutional: No fever  Eyes: No blurry vision  Neuro: No tremors  HEENT: No pain  Cardiovascular: No chest pain, palpitations  Respiratory: No SOB, no cough  GI: No nausea, vomiting, abdominal pain  : No dysuria  Skin: no rash  Psych: no depression  Endocrine: no polyuria, polydipsia  Hem/lymph: no swelling  Osteoporosis: no fractures    ALL OTHER SYSTEMS REVIEWED AND NEGATIVE    PHYSICAL EXAM:  VITALS: T(C): 36.7 (10-03-19 @ 14:28)  T(F): 98 (10-03-19 @ 14:28), Max: 98.2 (10-03-19 @ 05:05)  HR: 105 (10-03-19 @ 14:28) (97 - 105)  BP: 96/60 (10-03-19 @ 14:28) (96/60 - 117/76)  RR:  (18 - 18)  SpO2:  (96% - 97%)  Wt(kg): --    GENERAL: NAD, well-developed  RESPIRATORY: Clear to auscultation bilaterally; No rales, rhonchi, wheezing, or rubs  CARDIOVASCULAR: Regular rate and rhythm; No murmurs  GI: Soft, nontender, non distended  PSYCH: Alert and oriented x 3, reactive affect    POCT Blood Glucose.: 269 mg/dL (10-03-19 @ 12:08)  POCT Blood Glucose.: 257 mg/dL (10-03-19 @ 10:48)  POCT Blood Glucose.: 239 mg/dL (10-03-19 @ 08:46)  POCT Blood Glucose.: 262 mg/dL (10-02-19 @ 23:06)  POCT Blood Glucose.: 236 mg/dL (10-02-19 @ 18:34)  POCT Blood Glucose.: 246 mg/dL (10-02-19 @ 12:06)  POCT Blood Glucose.: 207 mg/dL (10-02-19 @ 08:01)  POCT Blood Glucose.: 258 mg/dL (10-02-19 @ 06:56)  POCT Blood Glucose.: 228 mg/dL (10-02-19 @ 06:08)  POCT Blood Glucose.: 241 mg/dL (10-02-19 @ 05:28)  POCT Blood Glucose.: 218 mg/dL (10-02-19 @ 04:04)  POCT Blood Glucose.: 213 mg/dL (10-02-19 @ 03:02)  POCT Blood Glucose.: 197 mg/dL (10-02-19 @ 01:55)  POCT Blood Glucose.: 197 mg/dL (10-02-19 @ 00:53)  POCT Blood Glucose.: 186 mg/dL (10-01-19 @ 23:59)  POCT Blood Glucose.: 166 mg/dL (10-01-19 @ 22:51)  POCT Blood Glucose.: 179 mg/dL (10-01-19 @ 22:00)  POCT Blood Glucose.: 197 mg/dL (10-01-19 @ 20:54)  POCT Blood Glucose.: 168 mg/dL (10-01-19 @ 19:53)  POCT Blood Glucose.: 152 mg/dL (10-01-19 @ 17:57)  POCT Blood Glucose.: 153 mg/dL (10-01-19 @ 17:01)  POCT Blood Glucose.: 178 mg/dL (10-01-19 @ 15:42)  POCT Blood Glucose.: 176 mg/dL (10-01-19 @ 15:02)  POCT Blood Glucose.: 187 mg/dL (10-01-19 @ 13:58)  POCT Blood Glucose.: 208 mg/dL (10-01-19 @ 12:47)  POCT Blood Glucose.: 262 mg/dL (10-01-19 @ 11:07)  POCT Blood Glucose.: 260 mg/dL (10-01-19 @ 09:59)  POCT Blood Glucose.: 271 mg/dL (10-01-19 @ 09:57)  POCT Blood Glucose.: 186 mg/dL (10-01-19 @ 09:02)  POCT Blood Glucose.: 182 mg/dL (10-01-19 @ 08:13)  POCT Blood Glucose.: 196 mg/dL (10-01-19 @ 06:04)  POCT Blood Glucose.: 238 mg/dL (10-01-19 @ 05:10)  POCT Blood Glucose.: 292 mg/dL (09-30-19 @ 21:10)  POCT Blood Glucose.: 306 mg/dL (09-30-19 @ 20:10)  POCT Blood Glucose.: 364 mg/dL (09-30-19 @ 17:52)  POCT Blood Glucose.: 373 mg/dL (09-30-19 @ 16:56)      10-03    139  |  103  |  5<L>  ----------------------------<  313<H>  3.6   |  17<L>  |  0.61    EGFR if : 150  EGFR if non : 129    Ca    9.1      10-03  Mg     1.7     10-03  Phos  3.2     10-03    TPro  6.2  /  Alb  3.9  /  TBili  0.8  /  DBili  x   /  AST  29  /  ALT  57<H>  /  AlkPhos  63  10-03        Thyroid Function Tests:      Hemoglobin A1C, Whole Blood: 11.3 % <H> [4.0 - 5.6] (09-30-19 @ 23:35)

## 2019-10-04 ENCOUNTER — TRANSCRIPTION ENCOUNTER (OUTPATIENT)
Age: 34
End: 2019-10-04

## 2019-10-04 VITALS
RESPIRATION RATE: 18 BRPM | SYSTOLIC BLOOD PRESSURE: 138 MMHG | HEART RATE: 102 BPM | TEMPERATURE: 98 F | DIASTOLIC BLOOD PRESSURE: 74 MMHG | OXYGEN SATURATION: 96 %

## 2019-10-04 LAB
ANION GAP SERPL CALC-SCNC: 15 MMOL/L — SIGNIFICANT CHANGE UP (ref 5–17)
ANION GAP SERPL CALC-SCNC: 15 MMOL/L — SIGNIFICANT CHANGE UP (ref 5–17)
BUN SERPL-MCNC: 8 MG/DL — SIGNIFICANT CHANGE UP (ref 7–23)
BUN SERPL-MCNC: 9 MG/DL — SIGNIFICANT CHANGE UP (ref 7–23)
CALCIUM SERPL-MCNC: 8.6 MG/DL — SIGNIFICANT CHANGE UP (ref 8.4–10.5)
CALCIUM SERPL-MCNC: 9.1 MG/DL — SIGNIFICANT CHANGE UP (ref 8.4–10.5)
CHLORIDE SERPL-SCNC: 100 MMOL/L — SIGNIFICANT CHANGE UP (ref 96–108)
CHLORIDE SERPL-SCNC: 101 MMOL/L — SIGNIFICANT CHANGE UP (ref 96–108)
CO2 SERPL-SCNC: 23 MMOL/L — SIGNIFICANT CHANGE UP (ref 22–31)
CO2 SERPL-SCNC: 23 MMOL/L — SIGNIFICANT CHANGE UP (ref 22–31)
CREAT SERPL-MCNC: 0.57 MG/DL — SIGNIFICANT CHANGE UP (ref 0.5–1.3)
CREAT SERPL-MCNC: 0.65 MG/DL — SIGNIFICANT CHANGE UP (ref 0.5–1.3)
GAD65 AB SER-MCNC: 0 NMOL/L — SIGNIFICANT CHANGE UP
GLUCOSE BLDC GLUCOMTR-MCNC: 212 MG/DL — HIGH (ref 70–99)
GLUCOSE BLDC GLUCOMTR-MCNC: 272 MG/DL — HIGH (ref 70–99)
GLUCOSE BLDC GLUCOMTR-MCNC: 317 MG/DL — HIGH (ref 70–99)
GLUCOSE SERPL-MCNC: 250 MG/DL — HIGH (ref 70–99)
GLUCOSE SERPL-MCNC: 270 MG/DL — HIGH (ref 70–99)
ISLET CELL512 AB SER-ACNC: SIGNIFICANT CHANGE UP
MAGNESIUM SERPL-MCNC: 1.6 MG/DL — SIGNIFICANT CHANGE UP (ref 1.6–2.6)
PHOSPHATE SERPL-MCNC: 3.6 MG/DL — SIGNIFICANT CHANGE UP (ref 2.5–4.5)
POTASSIUM SERPL-MCNC: 3.3 MMOL/L — LOW (ref 3.5–5.3)
POTASSIUM SERPL-MCNC: 3.4 MMOL/L — LOW (ref 3.5–5.3)
POTASSIUM SERPL-SCNC: 3.3 MMOL/L — LOW (ref 3.5–5.3)
POTASSIUM SERPL-SCNC: 3.4 MMOL/L — LOW (ref 3.5–5.3)
SODIUM SERPL-SCNC: 138 MMOL/L — SIGNIFICANT CHANGE UP (ref 135–145)
SODIUM SERPL-SCNC: 139 MMOL/L — SIGNIFICANT CHANGE UP (ref 135–145)

## 2019-10-04 PROCEDURE — 99239 HOSP IP/OBS DSCHRG MGMT >30: CPT

## 2019-10-04 PROCEDURE — 12345: CPT | Mod: NC

## 2019-10-04 PROCEDURE — 99232 SBSQ HOSP IP/OBS MODERATE 35: CPT

## 2019-10-04 RX ORDER — INSULIN GLARGINE 100 [IU]/ML
56 INJECTION, SOLUTION SUBCUTANEOUS
Qty: 0 | Refills: 0 | DISCHARGE
Start: 2019-10-04 | End: 2019-11-02

## 2019-10-04 RX ORDER — ENOXAPARIN SODIUM 100 MG/ML
50 INJECTION SUBCUTANEOUS
Qty: 30 | Refills: 0
Start: 2019-10-04 | End: 2019-11-02

## 2019-10-04 RX ORDER — POTASSIUM CHLORIDE 20 MEQ
20 PACKET (EA) ORAL ONCE
Refills: 0 | Status: COMPLETED | OUTPATIENT
Start: 2019-10-04 | End: 2019-10-04

## 2019-10-04 RX ORDER — INSULIN LISPRO 100/ML
22 VIAL (ML) SUBCUTANEOUS
Refills: 0 | Status: DISCONTINUED | OUTPATIENT
Start: 2019-10-04 | End: 2019-10-04

## 2019-10-04 RX ORDER — ISOPROPYL ALCOHOL, BENZOCAINE .7; .06 ML/ML; ML/ML
1 SWAB TOPICAL
Qty: 100 | Refills: 1
Start: 2019-10-04 | End: 2019-11-22

## 2019-10-04 RX ORDER — POTASSIUM CHLORIDE 20 MEQ
40 PACKET (EA) ORAL ONCE
Refills: 0 | Status: COMPLETED | OUTPATIENT
Start: 2019-10-04 | End: 2019-10-04

## 2019-10-04 RX ORDER — ENOXAPARIN SODIUM 100 MG/ML
60 INJECTION SUBCUTANEOUS
Qty: 1 | Refills: 0
Start: 2019-10-04 | End: 2019-11-02

## 2019-10-04 RX ORDER — INSULIN ASPART 100 [IU]/ML
20 INJECTION, SOLUTION SUBCUTANEOUS
Qty: 30 | Refills: 0
Start: 2019-10-04 | End: 2019-11-02

## 2019-10-04 RX ORDER — INSULIN ASPART 100 [IU]/ML
22 INJECTION, SOLUTION SUBCUTANEOUS
Qty: 1 | Refills: 0
Start: 2019-10-04 | End: 2019-11-02

## 2019-10-04 RX ORDER — INSULIN GLARGINE 100 [IU]/ML
60 INJECTION, SOLUTION SUBCUTANEOUS EVERY MORNING
Refills: 0 | Status: DISCONTINUED | OUTPATIENT
Start: 2019-10-04 | End: 2019-10-04

## 2019-10-04 RX ADMIN — Medication 19 UNIT(S): at 09:46

## 2019-10-04 RX ADMIN — Medication 20 MILLIEQUIVALENT(S): at 05:44

## 2019-10-04 RX ADMIN — INFLUENZA VIRUS VACCINE 0.5 MILLILITER(S): 15; 15; 15; 15 SUSPENSION INTRAMUSCULAR at 17:52

## 2019-10-04 RX ADMIN — Medication 6: at 09:47

## 2019-10-04 RX ADMIN — Medication 8: at 13:10

## 2019-10-04 RX ADMIN — Medication 22 UNIT(S): at 18:34

## 2019-10-04 RX ADMIN — Medication 100 MILLIGRAM(S): at 13:10

## 2019-10-04 RX ADMIN — INSULIN GLARGINE 50 UNIT(S): 100 INJECTION, SOLUTION SUBCUTANEOUS at 09:47

## 2019-10-04 RX ADMIN — Medication 22 UNIT(S): at 13:11

## 2019-10-04 RX ADMIN — Medication 4: at 18:34

## 2019-10-04 RX ADMIN — Medication 100 MILLIGRAM(S): at 05:44

## 2019-10-04 RX ADMIN — Medication 20 MILLIEQUIVALENT(S): at 13:10

## 2019-10-04 RX ADMIN — Medication 40 MILLIEQUIVALENT(S): at 02:44

## 2019-10-04 NOTE — PROGRESS NOTE ADULT - ASSESSMENT
33 M h/o morbid obesity (BMI 48.3) admitted with DKA s/p Insulin gtt and now transitioned to basal Insulin regimen, anion gap improving

## 2019-10-04 NOTE — PROGRESS NOTE ADULT - ATTENDING COMMENTS
1. DKA. Newly discovered DM by patient presenting as dka.. K+  back to normal and insulin drip started. Anion gap decreasing. Continue insulin protocol. Endocrine consult for  DM education.  2. Abdominal pain resolved. for RUQ ultrasound.
Serenity Stokes MD   Pager # [phone]  On evenings and weekends, please call the office at [phone] or page endocrine fellow on call. Please note that this patient may be followed by different provider tomorrow. If no answer, contact the office.
1. DKA. Off insulin  drip . Om long acting insulin. Continue DM education.  2. Abdominal pain resolved.  RUQ ultrasound normal.
AG 15 this am. Diabetic education provided, seen by nutritionist, comfortable injecting insulin.   Will plan for d/c today on endocrinology d/c recommendations. Will need to follow up in the [facility] endocrine clinic.    d/c time 37 minutes
Still with small anion gap. Lantus and Humalog increased today as per Endo. Will trend BMP Q6 hours.   Pt needs diabetic education given new dx and presentation of DKA as well as to speak with Nutritionist

## 2019-10-04 NOTE — PROGRESS NOTE ADULT - PROBLEM SELECTOR PLAN 1
- Anion gap 15 this AM   - c/w BMP q6h to ensure resolution of gap.  - Monitor FSG's.  - C/w current insulin regimen  - Endo following appreciate recs

## 2019-10-04 NOTE — DISCHARGE NOTE PROVIDER - NSDCCPCAREPLAN_GEN_ALL_CORE_FT
PRINCIPAL DISCHARGE DIAGNOSIS  Diagnosis: Diabetic ketoacidosis without coma associated with diabetes mellitus due to underlying condition  Assessment and Plan of Treatment: You came to the hospital with abdominal pain and increase in urination. You were found to be in Diabetic Ketoacidosis or DKA. You were treated with IV insulin and IV fluids. You clinically improved and you were transitioned to injectable Insulin. You will be discharged on an Insulin medication regimen that you should take as directed. Please follow up with your Endocrinologist Dr. Driver upon discharge.

## 2019-10-04 NOTE — DISCHARGE NOTE PROVIDER - CARE PROVIDER_API CALL
Michael Driver (MD)  Internal Medicine  [address] [place] [address]  Phone: [phone]  Fax: [phone]  Follow Up Time:

## 2019-10-04 NOTE — DISCHARGE NOTE NURSING/CASE MANAGEMENT/SOCIAL WORK - PATIENT PORTAL LINK FT
You can access the FollowMyHealth Patient Portal offered by [facility] by registering at the following website: [email or web address]. By joining [employer]’s FollowMyHealth portal, you will also be able to view your health information using other applications (apps) compatible with our system.

## 2019-10-04 NOTE — PROGRESS NOTE ADULT - SUBJECTIVE AND OBJECTIVE BOX
Patient is a 33y old  Male who presents with a chief complaint of abdominal pain (03 Oct 2019 16:36)      SUBJECTIVE / OVERNIGHT EVENTS:  No acute events over night. Pt seen and examined- no reported fevers, chills, n/v, CP, SOB, abdominal pain, dysuria or diarrhea. Anion gap 15 on most recent BMP. Overall reports feeling much improved      MEDICATIONS  (STANDING):  dextrose 5%. 1000 milliLiter(s) (50 mL/Hr) IV Continuous <Continuous>  dextrose 50% Injectable 12.5 Gram(s) IV Push once  dextrose 50% Injectable 25 Gram(s) IV Push once  dextrose 50% Injectable 25 Gram(s) IV Push once  docusate sodium 100 milliGRAM(s) Oral three times a day  influenza   Vaccine 0.5 milliLiter(s) IntraMuscular once  insulin glargine Injectable (LANTUS) 50 Unit(s) SubCutaneous every morning  insulin lispro (HumaLOG) corrective regimen sliding scale   SubCutaneous three times a day before meals  insulin lispro (HumaLOG) corrective regimen sliding scale   SubCutaneous at bedtime  insulin lispro Injectable (HumaLOG) 19 Unit(s) SubCutaneous three times a day before meals  potassium chloride    Tablet ER 20 milliEquivalent(s) Oral daily  senna 2 Tablet(s) Oral at bedtime    MEDICATIONS  (PRN):  bisacodyl Suppository 10 milliGRAM(s) Rectal once PRN Constipation  dextrose 40% Gel 15 Gram(s) Oral once PRN Blood Glucose LESS THAN 70 milliGRAM(s)/deciLiter  glucagon  Injectable 1 milliGRAM(s) IntraMuscular once PRN Glucose <70 milliGRAM(s)/deciLiter      CAPILLARY BLOOD GLUCOSE      POCT Blood Glucose.: 220 mg/dL (03 Oct 2019 22:35)  POCT Blood Glucose.: 224 mg/dL (03 Oct 2019 21:46)  POCT Blood Glucose.: 234 mg/dL (03 Oct 2019 18:01)  POCT Blood Glucose.: 269 mg/dL (03 Oct 2019 12:08)  POCT Blood Glucose.: 257 mg/dL (03 Oct 2019 10:48)  POCT Blood Glucose.: 239 mg/dL (03 Oct 2019 08:46)    I&O's Summary    03 Oct 2019 07:01  -  04 Oct 2019 07:00  --------------------------------------------------------  IN: 0 mL / OUT: 1625 mL / NET: -1625 mL        T(C): 36.9 (10-04-19 @ 05:27), Max: 36.9 (10-04-19 @ 05:27)  HR: 93 (10-04-19 @ 05:27) (93 - 105)  BP: 102/72 (10-04-19 @ 05:27) (96/60 - 108/71)  RR: 18 (10-04-19 @ 05:27) (18 - 18)  SpO2: 95% (10-04-19 @ 05:27) (95% - 97%)    PHYSICAL EXAM:  GENERAL: NAD, well-developed  HEAD:  Atraumatic, Normocephalic  EYES: EOMI, PERRLA, conjunctiva and sclera clear  CHEST/LUNG: Clear to auscultation bilaterally; No wheeze  HEART: Regular rate and rhythm; No murmurs, rubs, or gallops  ABDOMEN: Soft, Nontender, Nondistended; Bowel sounds present  EXTREMITIES:  2+ Peripheral Pulses,  PSYCH: AAOx3  NEUROLOGY: non-focal      LABS:                        14.2   5.70  )-----------( 157      ( 03 Oct 2019 06:24 )             39.6     WBC Trend: 5.70<--, 5.92<--, 6.9<--  10-04    139  |  101  |  8   ----------------------------<  250<H>  3.4<L>   |  23  |  0.57    Ca    8.6      04 Oct 2019 07:08  Phos  3.6     10-04  Mg     1.6     10-04    TPro  6.2  /  Alb  3.9  /  TBili  0.8  /  DBili  x   /  AST  29  /  ALT  57<H>  /  AlkPhos  63  10-03    Creatinine Trend: 0.57<--, 0.65<--, 0.67<--, 0.61<--, 0.63<--, 0.68<--              RADIOLOGY & ADDITIONAL TESTS:    Imaging Personally Reviewed:    Consultant(s) Notes Reviewed:      Care Discussed with Consultants/Other Providers:

## 2019-10-04 NOTE — PROGRESS NOTE ADULT - PROBLEM SELECTOR PLAN 2
- Resolved. Abdominal pain likely in the setting of DKA  - RUQ US revealing hepatomegaly, diffuse hepatic steatosis.  Will need outpt follow up with hepatology and PMD

## 2019-10-04 NOTE — PROGRESS NOTE ADULT - PROBLEM SELECTOR PLAN 1
- recommend increase Lantus to 60 units qAM  - increase Humalog to 22 units TID  - c/w moderate scale qac and qhs  - consistent carb diet  - follow up HAM and islet cell Ab  - for discharge:   Might be getting insurance as per patient.   Recommend basal bolus. does to be determined.    If he has no insurance, dc on Novolin/Humulin 70/30, doses to be determined. Will need to follow up in the [facility] endocrine clinic - [phone].

## 2019-10-04 NOTE — DISCHARGE NOTE PROVIDER - HOSPITAL COURSE
34 y/o morbidly obese Male w/ no significant pmh presented to the ED w/ a chief complaint of abdominal pain, polyuria, and polydipsia. In the EDn the ED, finger stick was 373. K+ 2.9, VBG showed pH 7.24, HCO3 8, AG 26, lactate 1.4. beta hydroxy butyrate >8.0. Patient was given 2L of LR IVF and transferred to the MICU for insulin gtt management of DKA.      He completed the DKA protocol, transitioned to basal insulin, and transferred to general medicine floors. Endocrinology was consulted and recommended Lantus to 50 units qAM and Humalog to 19 units TID. His anion gap decreased and he was deemed hemodynamically stable for discharge. Pt received diabetes education and was d/c on 70/30 insulin regimen per Endocrinology recs.

## 2019-10-04 NOTE — PROGRESS NOTE ADULT - REASON FOR ADMISSION
Chart Audit for Suspect Condition completed.
abdominal pain

## 2019-10-04 NOTE — PROGRESS NOTE ADULT - SUBJECTIVE AND OBJECTIVE BOX
Chief Complaint: Evaluating this 34 y/o M for uncontrolled Type 2 DM w/ hyperglycemia      Interval History: No current complaints at this time. Reports possible d/c home today. Still with hyperglycemia. + po intake.     MEDICATIONS  (STANDING):  dextrose 5%. 1000 milliLiter(s) (50 mL/Hr) IV Continuous <Continuous>  dextrose 50% Injectable 12.5 Gram(s) IV Push once  dextrose 50% Injectable 25 Gram(s) IV Push once  dextrose 50% Injectable 25 Gram(s) IV Push once  docusate sodium 100 milliGRAM(s) Oral three times a day  influenza   Vaccine 0.5 milliLiter(s) IntraMuscular once  insulin glargine Injectable (LANTUS) 60 Unit(s) SubCutaneous every morning  insulin lispro (HumaLOG) corrective regimen sliding scale   SubCutaneous three times a day before meals  insulin lispro (HumaLOG) corrective regimen sliding scale   SubCutaneous at bedtime  insulin lispro Injectable (HumaLOG) 22 Unit(s) SubCutaneous three times a day before meals  potassium chloride    Tablet ER 20 milliEquivalent(s) Oral daily  senna 2 Tablet(s) Oral at bedtime    MEDICATIONS  (PRN):  bisacodyl Suppository 10 milliGRAM(s) Rectal once PRN Constipation  dextrose 40% Gel 15 Gram(s) Oral once PRN Blood Glucose LESS THAN 70 milliGRAM(s)/deciLiter  glucagon  Injectable 1 milliGRAM(s) IntraMuscular once PRN Glucose <70 milliGRAM(s)/deciLiter      Allergies    No Known Allergies    Intolerances      Review of Systems:  Constitutional: No fever  Eyes: No blurry vision  Cardiovascular: No chest pain  Respiratory: No SOB  GI: No abdominal pain, No nausea, No vomiting  Endocrine: as noted in HPI    All other negative      PHYSICAL EXAM:  VITALS: T(C): 36.7 (10-04-19 @ 13:55)  T(F): 98 (10-04-19 @ 13:55), Max: 98.5 (10-04-19 @ 05:27)  HR: 101 (10-04-19 @ 13:55) (93 - 101)  BP: 129/79 (10-04-19 @ 13:55) (102/72 - 129/79)  RR:  (18 - 18)  SpO2:  (95% - 97%)  Wt(kg): --  GENERAL: NAD at this time  EYES: EOMI, No proptosis  HEENT:  Atraumatic, Normocephalic,   RESPIRATORY: full excursion, non labored  CARDIOVASCULAR: Regular rhythm; normal S1/S2, no peripheral edema  GI: Soft, nontender, non distended, normal bowel sounds  SKIN: Warm and dry  PSYCH: normal affect, normal mood      POCT Blood Glucose.: 317 mg/dL (10-04-19 @ 12:55)  POCT Blood Glucose.: 272 mg/dL (10-04-19 @ 09:39)  POCT Blood Glucose.: 220 mg/dL (10-03-19 @ 22:35)  POCT Blood Glucose.: 224 mg/dL (10-03-19 @ 21:46)  POCT Blood Glucose.: 234 mg/dL (10-03-19 @ 18:01)  POCT Blood Glucose.: 269 mg/dL (10-03-19 @ 12:08)  POCT Blood Glucose.: 257 mg/dL (10-03-19 @ 10:48)  POCT Blood Glucose.: 239 mg/dL (10-03-19 @ 08:46)  POCT Blood Glucose.: 262 mg/dL (10-02-19 @ 23:06)  POCT Blood Glucose.: 236 mg/dL (10-02-19 @ 18:34)  POCT Blood Glucose.: 246 mg/dL (10-02-19 @ 12:06)  POCT Blood Glucose.: 207 mg/dL (10-02-19 @ 08:01)  POCT Blood Glucose.: 258 mg/dL (10-02-19 @ 06:56)  POCT Blood Glucose.: 228 mg/dL (10-02-19 @ 06:08)  POCT Blood Glucose.: 241 mg/dL (10-02-19 @ 05:28)  POCT Blood Glucose.: 218 mg/dL (10-02-19 @ 04:04)  POCT Blood Glucose.: 213 mg/dL (10-02-19 @ 03:02)  POCT Blood Glucose.: 197 mg/dL (10-02-19 @ 01:55)  POCT Blood Glucose.: 197 mg/dL (10-02-19 @ 00:53)  POCT Blood Glucose.: 186 mg/dL (10-01-19 @ 23:59)  POCT Blood Glucose.: 166 mg/dL (10-01-19 @ 22:51)  POCT Blood Glucose.: 179 mg/dL (10-01-19 @ 22:00)  POCT Blood Glucose.: 197 mg/dL (10-01-19 @ 20:54)  POCT Blood Glucose.: 168 mg/dL (10-01-19 @ 19:53)  POCT Blood Glucose.: 152 mg/dL (10-01-19 @ 17:57)  POCT Blood Glucose.: 153 mg/dL (10-01-19 @ 17:01)        10-04    139  |  101  |  8   ----------------------------<  250<H>  3.4<L>   |  23  |  0.57    EGFR if : 154  EGFR if non : 133    Ca    8.6      10-04  Mg     1.6     10-04  Phos  3.6     10-04    TPro  6.2  /  Alb  3.9  /  TBili  0.8  /  DBili  x   /  AST  29  /  ALT  57<H>  /  AlkPhos  63  10-03        Thyroid Function Tests:      Hemoglobin A1C, Whole Blood: 11.3 % <H> [4.0 - 5.6] (09-30-19 @ 23:35)

## 2019-10-04 NOTE — CHART NOTE - NSCHARTNOTEFT_GEN_A_CORE
Nutrition Follow Up Note    Consult received and appreciated re: diet education    Pt is newly diagnosed DM, A1C 11.3% (09-30). DM diet education received on 10/2. Pt had questions regarding DM diet, reinforced DM diet. Reviewed carb counting, importance of pairing carbs + prot, and whole grains vs processed carbs. Identified sources of protein, carbs, and fat. Identified high/low carb foods. Reviewed importance of glucose monitoring and insulin compliance.     Diet : Consistent Carbohydrate with PM snack      Daily Weight: 157 kg(10-04), 157 kg (10-03), 156.6 kg (10-01)  Weight Change: weight stable     Pertinent Medications: MEDICATIONS  (STANDING):  dextrose 5%. 1000 milliLiter(s) (50 mL/Hr) IV Continuous <Continuous>  dextrose 50% Injectable 12.5 Gram(s) IV Push once  dextrose 50% Injectable 25 Gram(s) IV Push once  dextrose 50% Injectable 25 Gram(s) IV Push once  docusate sodium 100 milliGRAM(s) Oral three times a day  influenza   Vaccine 0.5 milliLiter(s) IntraMuscular once  insulin glargine Injectable (LANTUS) 60 Unit(s) SubCutaneous every morning  insulin lispro (HumaLOG) corrective regimen sliding scale   SubCutaneous three times a day before meals  insulin lispro (HumaLOG) corrective regimen sliding scale   SubCutaneous at bedtime  insulin lispro Injectable (HumaLOG) 22 Unit(s) SubCutaneous three times a day before meals  potassium chloride    Tablet ER 20 milliEquivalent(s) Oral daily  senna 2 Tablet(s) Oral at bedtime    MEDICATIONS  (PRN):  bisacodyl Suppository 10 milliGRAM(s) Rectal once PRN Constipation  dextrose 40% Gel 15 Gram(s) Oral once PRN Blood Glucose LESS THAN 70 milliGRAM(s)/deciLiter  glucagon  Injectable 1 milliGRAM(s) IntraMuscular once PRN Glucose <70 milliGRAM(s)/deciLiter    Pertinent Labs: 10-04 @ 07:08: Na 139, BUN 8, Cr 0.57, <H>, K+ 3.4<L>, Phos 3.6, Mg 1.6, Alk Phos --, ALT/SGPT --, AST/SGOT --, HbA1c --  10-04 @ 00:28: Na 138, BUN 9, Cr 0.65, <H>, K+ 3.3<L>, Phos --, Mg --, Alk Phos --, ALT/SGPT --, AST/SGOT --, HbA1c --  10-03 @ 19:00: Na 140, BUN 7, Cr 0.67, <H>, K+ 4.0, Phos --, Mg --, Alk Phos --, ALT/SGPT --, AST/SGOT --, HbA1c --    Finger Sticks:  POCT Blood Glucose.: 317 mg/dL (10-04 @ 12:55)  POCT Blood Glucose.: 272 mg/dL (10-04 @ 09:39)  POCT Blood Glucose.: 220 mg/dL (10-03 @ 22:35)  POCT Blood Glucose.: 224 mg/dL (10-03 @ 21:46)  POCT Blood Glucose.: 234 mg/dL (10-03 @ 18:01)      Skin: no pressure ulcers noted    Edema: none noted     Estimated Needs:   [X] no change since previous assessment  [ ] recalculated:     Previous Nutrition Diagnosis: overweight/obesity  Nutrition Diagnosis is: on-going    Previous Nutrition Diagnosis: Food & Nutrition Related Knowledge Deficit  Nutrition Diagnosis is: improving with DM diet education and reinforcement      Recommend  1) continue Consistent Carbohydrate with PM snack     Monitoring and Evaluation:     Continue to monitor Nutritional intake, Tolerance to diet prescription, weights, labs, skin integrity    RD remains available upon request and will follow up per protocol

## 2019-10-07 ENCOUNTER — CLINICAL ADVICE (OUTPATIENT)
Age: 34
End: 2019-10-07

## 2019-10-07 ENCOUNTER — INBOUND DOCUMENT (OUTPATIENT)
Age: 34
End: 2019-10-07

## 2019-10-07 PROBLEM — Z87.898 PERSONAL HISTORY OF OTHER SPECIFIED CONDITIONS: Chronic | Status: ACTIVE | Noted: 2019-09-30

## 2019-10-07 PROBLEM — Z00.00 ENCOUNTER FOR PREVENTIVE HEALTH EXAMINATION: Status: ACTIVE | Noted: 2019-10-07

## 2019-10-07 PROBLEM — E66.01 MORBID (SEVERE) OBESITY DUE TO EXCESS CALORIES: Chronic | Status: ACTIVE | Noted: 2019-09-30

## 2019-10-14 ENCOUNTER — APPOINTMENT (OUTPATIENT)
Dept: ENDOCRINOLOGY | Facility: CLINIC | Age: 34
End: 2019-10-14

## 2019-10-14 RX ORDER — BLOOD-GLUCOSE METER
KIT MISCELLANEOUS
Qty: 400 | Refills: 1 | Status: ACTIVE | COMMUNITY
Start: 2019-10-14 | End: 1900-01-01

## 2019-10-14 RX ORDER — BLOOD SUGAR DIAGNOSTIC
STRIP MISCELLANEOUS
Qty: 400 | Refills: 1 | Status: ACTIVE | COMMUNITY
Start: 2019-10-14 | End: 1900-01-01

## 2019-10-31 PROCEDURE — 85027 COMPLETE CBC AUTOMATED: CPT

## 2019-10-31 PROCEDURE — 82803 BLOOD GASES ANY COMBINATION: CPT

## 2019-10-31 PROCEDURE — 82565 ASSAY OF CREATININE: CPT

## 2019-10-31 PROCEDURE — 82010 KETONE BODYS QUAN: CPT

## 2019-10-31 PROCEDURE — 84132 ASSAY OF SERUM POTASSIUM: CPT

## 2019-10-31 PROCEDURE — 83605 ASSAY OF LACTIC ACID: CPT

## 2019-10-31 PROCEDURE — 90686 IIV4 VACC NO PRSV 0.5 ML IM: CPT

## 2019-10-31 PROCEDURE — 87086 URINE CULTURE/COLONY COUNT: CPT

## 2019-10-31 PROCEDURE — 71045 X-RAY EXAM CHEST 1 VIEW: CPT

## 2019-10-31 PROCEDURE — 83690 ASSAY OF LIPASE: CPT

## 2019-10-31 PROCEDURE — 85014 HEMATOCRIT: CPT

## 2019-10-31 PROCEDURE — 80053 COMPREHEN METABOLIC PANEL: CPT

## 2019-10-31 PROCEDURE — 80061 LIPID PANEL: CPT

## 2019-10-31 PROCEDURE — 86341 ISLET CELL ANTIBODY: CPT

## 2019-10-31 PROCEDURE — 80048 BASIC METABOLIC PNL TOTAL CA: CPT

## 2019-10-31 PROCEDURE — 82962 GLUCOSE BLOOD TEST: CPT

## 2019-10-31 PROCEDURE — 83930 ASSAY OF BLOOD OSMOLALITY: CPT

## 2019-10-31 PROCEDURE — 84100 ASSAY OF PHOSPHORUS: CPT

## 2019-10-31 PROCEDURE — 81001 URINALYSIS AUTO W/SCOPE: CPT

## 2019-10-31 PROCEDURE — 83735 ASSAY OF MAGNESIUM: CPT

## 2019-10-31 PROCEDURE — 96374 THER/PROPH/DIAG INJ IV PUSH: CPT

## 2019-10-31 PROCEDURE — 76705 ECHO EXAM OF ABDOMEN: CPT

## 2019-10-31 PROCEDURE — 82947 ASSAY GLUCOSE BLOOD QUANT: CPT

## 2019-10-31 PROCEDURE — 84295 ASSAY OF SERUM SODIUM: CPT

## 2019-10-31 PROCEDURE — 83036 HEMOGLOBIN GLYCOSYLATED A1C: CPT

## 2019-10-31 PROCEDURE — 82330 ASSAY OF CALCIUM: CPT

## 2019-10-31 PROCEDURE — 99285 EMERGENCY DEPT VISIT HI MDM: CPT | Mod: 25

## 2019-10-31 PROCEDURE — 82435 ASSAY OF BLOOD CHLORIDE: CPT

## 2019-10-31 PROCEDURE — 96375 TX/PRO/DX INJ NEW DRUG ADDON: CPT

## 2019-11-11 ENCOUNTER — RX RENEWAL (OUTPATIENT)
Age: 34
End: 2019-11-11

## 2019-11-18 ENCOUNTER — RX RENEWAL (OUTPATIENT)
Age: 34
End: 2019-11-18

## 2019-11-18 ENCOUNTER — APPOINTMENT (OUTPATIENT)
Dept: ENDOCRINOLOGY | Facility: CLINIC | Age: 34
End: 2019-11-18
Payer: MEDICAID

## 2019-11-18 VITALS — BODY MASS INDEX: 44.1 KG/M2 | WEIGHT: 315 LBS | HEIGHT: 71 IN

## 2019-11-18 LAB — HBA1C MFR BLD HPLC: 11.3

## 2019-11-18 PROCEDURE — G0108 DIAB MANAGE TRN  PER INDIV: CPT

## 2019-12-03 ENCOUNTER — RX RENEWAL (OUTPATIENT)
Age: 34
End: 2019-12-03

## 2019-12-06 ENCOUNTER — RX RENEWAL (OUTPATIENT)
Age: 34
End: 2019-12-06

## 2020-01-21 ENCOUNTER — APPOINTMENT (OUTPATIENT)
Dept: ENDOCRINOLOGY | Facility: CLINIC | Age: 35
End: 2020-01-21
Payer: MEDICAID

## 2020-01-21 VITALS
HEIGHT: 71 IN | DIASTOLIC BLOOD PRESSURE: 80 MMHG | WEIGHT: 315 LBS | SYSTOLIC BLOOD PRESSURE: 110 MMHG | BODY MASS INDEX: 44.1 KG/M2

## 2020-01-21 DIAGNOSIS — F17.200 NICOTINE DEPENDENCE, UNSPECIFIED, UNCOMPLICATED: ICD-10-CM

## 2020-01-21 PROCEDURE — 95251 CONT GLUC MNTR ANALYSIS I&R: CPT

## 2020-01-21 PROCEDURE — 99215 OFFICE O/P EST HI 40 MIN: CPT | Mod: 25

## 2020-01-21 NOTE — HISTORY OF PRESENT ILLNESS
[FreeTextEntry1] : Mr. ALBERTO CHRISTIANSON is 34 year old male here to establish care for Type 2 DM. \par \par Was discharged from hospital in Oct 2019, with newly diagnosed Type 2 Dm, A1C 11.3% (Sept/2019), EGFR 133. \par He was started on basal bolus regimen with Lantus 60 units and Novolog 22 units ac meals\par \par Currently taking \par Novolog 24/22/22 (B/L/D)\par Basaglar 66 units at night time. \par \par He reports that he had been adherent with his medication. \par \par He is checking his glucose Craig sensor, he checks his glucose "very often".  \par \par 24 hour recall\par B: Egg or donut with cream cheese and butter\par L: usually salad with turkey slices \par D: A burguer, he only eats with a bun.  \par He doesn't snack. \par \par negative HAM, negative islet cell antibody.  \par \par He doesn't eat the same hours daily.  He works as a .  He eats dinner sometimes late at night.  \par \par Regarding cholesterol, patient denied prior history of elevated cholesterol, no lipid panel in chart\par \par Patient denies any history of hypertension as well.

## 2020-01-21 NOTE — ASSESSMENT
[Hypoglycemia Management] : hypoglycemia management [Diabetes Foot Care] : diabetes foot care [Carbohydrate Consistent Diet] : carbohydrate consistent diet [Action and use of Insulin] : action and use of short and long-acting insulin [Long Term Vascular Complications] : long term vascular complications of diabetes [Self Monitoring of Blood Glucose] : self monitoring of blood glucose [Insulin Self-Administration] : insulin self-administration [Injection Technique, Storage, Sharps Disposal] : injection technique, storage, and sharps disposal [Retinopathy Screening] : Patient was referred to ophthalmology for retinopathy screening [FreeTextEntry1] : Patient is a 34-year-old male with history of diabetic ketoacidosis in the setting of newly diagnosed type 2 diabetes mellitus, prior antibody workup includes negative HAM, negative islet cell antibody; here for endocrinology followup after his discharge from the hospital.\par \par #1 type 2 diabetes mellitus\par His A1c today is 5.7%.  Review of his continuous glucose monitoring system was noted above.  Notable for severe hypoglycemia, recommend to decrease his insulin doses just below.\par Lantus 66-60 units at bedtime\par NovoLog 20 units with breakfast, 18 units with lunch and 18 units with dinner.\par Given obesity, we'll also start patient on metformin 500 mg twice daily and titrate to a total daily dose of thousand milligram twice daily.\par Will try to discontinue his mealtime insulin if possible and transitioned to GLP1 for additional weight benefit.\par We'll check fasting lipid panel to ensure that triglyceride level is on target.  Prior to starting GLP1.\par Referral for opthal and podiatry given. \par \par 2 morbid obesity\par Discussed weight loss surgery with patient.  We'll give referral to bariatric surgery.  Patient is somewhat hesitant because his sister had a terrible experience increased.  He also had a relative who  from complications of weight loss surgery.\par \par #3 hyperlipidemia\par LDL goal for this patient is less than 70 mg/dL.  Recommend to check fasting lipid panel.\par \par #4 blood pressure\par Blood pressure today is currently at school.  We'll check microalbumin level.

## 2020-01-21 NOTE — PHYSICAL EXAM
[No Acute Distress] : no acute distress [Well Nourished] : well nourished [Alert] : alert [Well Developed] : well developed [EOMI] : extra ocular movement intact [Normal Sclera/Conjunctiva] : normal sclera/conjunctiva [Thyroid Not Enlarged] : the thyroid was not enlarged [No Proptosis] : no proptosis [Normal Oropharynx] : the oropharynx was normal [No Accessory Muscle Use] : no accessory muscle use [No Respiratory Distress] : no respiratory distress [No Thyroid Nodules] : there were no palpable thyroid nodules [Clear to Auscultation] : lungs were clear to auscultation bilaterally [Normal S1, S2] : normal S1 and S2 [Normal Rate] : heart rate was normal  [Regular Rhythm] : with a regular rhythm [Pedal Pulses Normal] : the pedal pulses are present [No Edema] : there was no peripheral edema [Normal Bowel Sounds] : normal bowel sounds [Not Tender] : non-tender [Not Distended] : not distended [Anterior Cervical Nodes] : anterior cervical nodes [Soft] : abdomen soft [Post Cervical Nodes] : posterior cervical nodes [Normal] : normal and non tender [Axillary Nodes] : axillary nodes [No Spinal Tenderness] : no spinal tenderness [Spine Straight] : spine straight [No Stigmata of Cushings Syndrome] : no stigmata of cushings syndrome [Normal Strength/Tone] : muscle strength and tone were normal [Normal Gait] : normal gait [No Rash] : no rash [Normal Reflexes] : deep tendon reflexes were 2+ and symmetric [Oriented x3] : oriented to person, place, and time [No Tremors] : no tremors [Acanthosis Nigricans] : no acanthosis nigricans [de-identified] : obese male.

## 2020-01-23 LAB
CHOLEST SERPL-MCNC: 189 MG/DL
CHOLEST/HDLC SERPL: 5.2 RATIO
CREAT SPEC-SCNC: 175 MG/DL
GLUCOSE BLDC GLUCOMTR-MCNC: 97
HBA1C MFR BLD HPLC: 5.7
HDLC SERPL-MCNC: 36 MG/DL
LDLC SERPL CALC-MCNC: 116 MG/DL
MICROALBUMIN 24H UR DL<=1MG/L-MCNC: 1.5 MG/DL
MICROALBUMIN/CREAT 24H UR-RTO: 8 MG/G
TRIGL SERPL-MCNC: 181 MG/DL

## 2020-04-27 ENCOUNTER — APPOINTMENT (OUTPATIENT)
Dept: ENDOCRINOLOGY | Facility: CLINIC | Age: 35
End: 2020-04-27
Payer: MEDICAID

## 2020-04-27 PROCEDURE — 99214 OFFICE O/P EST MOD 30 MIN: CPT | Mod: 95

## 2020-04-27 RX ORDER — INSULIN ASPART 100 [IU]/ML
100 INJECTION, SOLUTION INTRAVENOUS; SUBCUTANEOUS
Qty: 5 | Refills: 0 | Status: DISCONTINUED | COMMUNITY
Start: 2019-11-18 | End: 2020-04-27

## 2020-04-27 NOTE — PHYSICAL EXAM
[Well Nourished] : well nourished [Alert] : alert [No Proptosis] : no proptosis [No Lid Lag] : no lid lag [No Neck Mass] : no neck mass was observed [Normal Hearing] : hearing was normal [No Respiratory Distress] : no respiratory distress [Normal Rate and Effort] : normal respiratory rate and effort [No Accessory Muscle Use] : no accessory muscle use [No Motor Deficits] : the motor exam was normal [No Tremors] : no tremors [No Spinal Tenderness] : no spinal tenderness [Normal Affect] : the affect was normal [Oriented x3] : oriented to person, place, and time [Normal Insight/Judgement] : insight and judgment were intact [Normal Mood] : the mood was normal [de-identified] : Unable to perform [de-identified] : Unable to perform [de-identified] : Unable to perform [de-identified] : Unable to perform

## 2020-04-27 NOTE — HISTORY OF PRESENT ILLNESS
[Continuous Glucose Monitoring] : Continuous Glucose Monitoring: Yes [Craig] : Craig [FreeTextEntry2] : 89 [FreeTextEntry1] : Mr. ALBERTO CHRISTIANSON is 34 year old male here to establish care for Type 2 DM. \par \par Was discharged from hospital in Oct 2019, with newly diagnosed Type 2 Dm, A1C 11.3% (Sept/2019), EGFR 133. \par He was started on basal bolus regimen with Lantus 60 units and Novolog 22 units ac meals\par \par Currently taking \par Novolog 20/18/18 (B/L/D)\par Basaglar 56 units at night time. \par Metformin 1000mg bid \par \par He reports that he had been adherent with his medication. \par \par He is checking his glucose Craig sensor, he checks his glucose "very often".  \par \par 24 hour recall\par B: Egg, sometimes grilled cheese.  \par L: soup, greek food.  \par D: A burger, he only eats with a bun.  \par He doesn't snack. \par \par negative HAM, negative islet cell antibody.  \par \par He doesn't eat the same hours daily.  He works as a .  He eats dinner sometimes late at night.  \par \par Regarding cholesterol, patient denied prior history of elevated cholesterol, currently taking crestor 10mg daily.  \par \par Patient denies any history of hypertension as well.   [FreeTextEntry4] : 3 [FreeTextEntry3] : 8 [FreeTextEntry5] : 123 [FreeTextEntry6] : 30.2

## 2020-04-27 NOTE — ASSESSMENT
[Carbohydrate Consistent Diet] : carbohydrate consistent diet [Diabetes Foot Care] : diabetes foot care [Action and use of Insulin] : action and use of short and long-acting insulin [Hypoglycemia Management] : hypoglycemia management [FreeTextEntry1] : Patient is a 34-year-old male with history of diabetic ketoacidosis in the setting of newly diagnosed type 2 diabetes mellitus, prior antibody workup includes negative HAM, negative islet cell antibody; here for endocrinology followup after his discharge from the hospital.\par \par #1 type 2 diabetes mellitus\par His A1c today is 5.7%.  Review of his continuous glucose monitoring system was noted above.  Notable for severe hypoglycemia, recommend to decrease his insulin doses just below.\par Continue with Lantus 56 units at bedtime\par Continue with metformin 1000 twice daily\par Will discontinue mealtime insulin and transition to GLP-1.\par Will start Ozempic 0.25 mg weekly for 4 weeks followed by 0.5 mg afterwards\par Email patient instructions on how to administer Ozempic pen, last triglyceride 2020 181 mg/dL, done nonfasting.\par Follow-up with ophthalmology\par Follow-up with podiatry both appointment has not been made due to current pandemic\par 2 morbid obesity\par Discussed weight loss surgery with patient.  We'll give referral to bariatric surgery.  Patient is somewhat hesitant because his sister had a terrible experience increased.  He also had a relative who  from complications of weight loss surgery.\par \par #3 hyperlipidemia\par  mg/dL started on statin therapy with atorvastatin 20 mg once daily\par \par #4 blood pressure\par Blood pressure currently on target, microalbumin check in 2020 8 mg/g.

## 2020-08-10 ENCOUNTER — RX RENEWAL (OUTPATIENT)
Age: 35
End: 2020-08-10

## 2020-09-16 ENCOUNTER — APPOINTMENT (OUTPATIENT)
Dept: ENDOCRINOLOGY | Facility: CLINIC | Age: 35
End: 2020-09-16
Payer: MEDICAID

## 2020-09-16 VITALS
WEIGHT: 315 LBS | HEIGHT: 71 IN | SYSTOLIC BLOOD PRESSURE: 122 MMHG | OXYGEN SATURATION: 98 % | TEMPERATURE: 98.3 F | BODY MASS INDEX: 44.1 KG/M2 | DIASTOLIC BLOOD PRESSURE: 82 MMHG | HEART RATE: 90 BPM

## 2020-09-16 LAB
GLUCOSE BLDC GLUCOMTR-MCNC: 109
HBA1C MFR BLD HPLC: 7.5

## 2020-09-16 PROCEDURE — 83036 HEMOGLOBIN GLYCOSYLATED A1C: CPT | Mod: QW

## 2020-09-16 PROCEDURE — 99214 OFFICE O/P EST MOD 30 MIN: CPT | Mod: 25

## 2020-09-16 PROCEDURE — 82962 GLUCOSE BLOOD TEST: CPT

## 2020-09-16 NOTE — ASSESSMENT
[Carbohydrate Consistent Diet] : carbohydrate consistent diet [Diabetes Foot Care] : diabetes foot care [Long Term Vascular Complications] : long term vascular complications of diabetes [Exercise/Effect on Glucose] : exercise/effect on glucose [Hypoglycemia Management] : hypoglycemia management [Importance of Diet and Exercise] : importance of diet and exercise to improve glycemic control, achieve weight loss and improve cardiovascular health [Glucagon Use] : glucagon use [Ketone Testing] : ketone testing [Action and use of Insulin] : action and use of short and long-acting insulin [Self Monitoring of Blood Glucose] : self monitoring of blood glucose [Insulin Self-Administration] : insulin self-administration [Injection Technique, Storage, Sharps Disposal] : injection technique, storage, and sharps disposal [Retinopathy Screening] : Patient was referred to ophthalmology for retinopathy screening [Sick-Day Management] : sick-day management [FreeTextEntry1] : Patient is a 34-year-old male with history of diabetic ketoacidosis in the setting of newly diagnosed type 2 diabetes mellitus, prior antibody workup includes negative HAM, negative islet cell antibody; here for endocrinology followup after his discharge from the hospital.\par \par #1 type 2 diabetes mellitus\par His A1c today is 7.5 percent, in 2028 was 5.7%.\par Patient was able to discontinue mealtime insulin. \par Patient is currently on Ozempic 0.5 mg once weekly and tolerating well. \par We will consider increasing the dose to 1 mg once weekly. \par Patient continues to have terrible side effects, abdominal cramp, with metformin, even 500 mg once daily.  Also seeing the whole pill being excreted through the stool.  Therefore recommend to discontinue metformin\par Continue with Lantus 56 units at bedtime\par Will discontinue mealtime insulin and transition to GLP-1.\par Continue with Ozempic and increase the dose to 1 mg once weekly.\par Follow-up with ophthalmology needed, will call to schedule \par Follow-up with podiatry both appointment has not been made due to current pandemic\par \par #2 Morbid Obesity\par Discussed weight loss surgery with patient.  We'll give referral to bariatric surgery.  Patient is somewhat hesitant because his sister had a terrible experience increased.  He also had a relative who  from complications of weight loss surgery.\par \par #3 hyperlipidemia\par  mg/dL started on statin therapy with atorvastatin 10mg once daily \par \par #4 blood pressure, 122/82\par Blood pressure currently on target, microalbumin check in 2020 8 mg/g.\par \par #5 Thyroid enlargement\par Will thyroid US.

## 2020-09-16 NOTE — PHYSICAL EXAM
[Alert] : alert [Well Nourished] : well nourished [No Acute Distress] : no acute distress [Well Developed] : well developed [Normal Sclera/Conjunctiva] : normal sclera/conjunctiva [EOMI] : extra ocular movement intact [No Proptosis] : no proptosis [Normal Oropharynx] : the oropharynx was normal [No Respiratory Distress] : no respiratory distress [No Accessory Muscle Use] : no accessory muscle use [Clear to Auscultation] : lungs were clear to auscultation bilaterally [Normal S1, S2] : normal S1 and S2 [Normal Rate] : heart rate was normal [Regular Rhythm] : with a regular rhythm [No Edema] : no peripheral edema [Pedal Pulses Normal] : the pedal pulses are present [Normal Bowel Sounds] : normal bowel sounds [Not Tender] : non-tender [Not Distended] : not distended [Soft] : abdomen soft [Normal Anterior Cervical Nodes] : no anterior cervical lymphadenopathy [Normal Posterior Cervical Nodes] : no posterior cervical lymphadenopathy [No Spinal Tenderness] : no spinal tenderness [Spine Straight] : spine straight [No Stigmata of Cushings Syndrome] : no stigmata of Cushings Syndrome [Normal Gait] : normal gait [Normal Strength/Tone] : muscle strength and tone were normal [No Rash] : no rash [Acanthosis Nigricans] : no acanthosis nigricans [Normal Reflexes] : deep tendon reflexes were 2+ and symmetric [No Tremors] : no tremors [Oriented x3] : oriented to person, place, and time [de-identified] : possible thyroid nodule on right thyroid gland.

## 2020-09-16 NOTE — HISTORY OF PRESENT ILLNESS
[FreeTextEntry1] : Mr. ALBERTO CHRISTIANSON is 34 year old male here to establish care for Type 2 DM. \par \par Was discharged from hospital in Oct 2019, with newly diagnosed Type 2 Dm, A1C 11.3% (Sept/2019), EGFR 133. \par He was started on basal bolus regimen with Lantus 60 units and Novolog 22 units ac meals\par Last visit, given overall good A1c control of 5.7% in January 2020, mealtime insulin was discontinued.\par \par Currently taking: \par Basaglar 56 units at night time. \par Metformin, self reduced to 500mg twice daily.  He was feeling abdominal cramps.  \par He reports that he had been adherent with his medication. \par Ozempic 0.5mg once weekly \par \par He is checking his glucose Craig sensor, he checks his glucose "very often".  \par \par 24 hour recall\par B: Egg, sometimes grilled cheese.  \par L: soup, greek food.  \par D: A burger, he only eats with a bun.  \par He doesn't snack. \par \par negative HAM, negative islet cell antibody.  \par \par He doesn't eat the same hours daily.  He works as a .  He eats dinner sometimes late at night.  \par \par Regarding cholesterol, patient denied prior history of elevated cholesterol, currently taking crestor 10mg daily.  \par \par Patient denies any history of hypertension as well.

## 2020-09-21 ENCOUNTER — OUTPATIENT (OUTPATIENT)
Dept: OUTPATIENT SERVICES | Facility: HOSPITAL | Age: 35
LOS: 1 days | End: 2020-09-21
Payer: MEDICAID

## 2020-09-21 ENCOUNTER — APPOINTMENT (OUTPATIENT)
Dept: ULTRASOUND IMAGING | Facility: CLINIC | Age: 35
End: 2020-09-21
Payer: MEDICAID

## 2020-09-21 DIAGNOSIS — E04.9 NONTOXIC GOITER, UNSPECIFIED: ICD-10-CM

## 2020-09-21 PROCEDURE — 76536 US EXAM OF HEAD AND NECK: CPT | Mod: 26

## 2020-09-21 PROCEDURE — 76536 US EXAM OF HEAD AND NECK: CPT

## 2020-10-07 ENCOUNTER — RX RENEWAL (OUTPATIENT)
Age: 35
End: 2020-10-07

## 2020-11-03 ENCOUNTER — RX RENEWAL (OUTPATIENT)
Age: 35
End: 2020-11-03

## 2020-11-12 ENCOUNTER — APPOINTMENT (OUTPATIENT)
Dept: ENDOCRINOLOGY | Facility: CLINIC | Age: 35
End: 2020-11-12
Payer: MEDICAID

## 2020-11-12 VITALS
SYSTOLIC BLOOD PRESSURE: 136 MMHG | TEMPERATURE: 98.4 F | HEART RATE: 103 BPM | DIASTOLIC BLOOD PRESSURE: 80 MMHG | OXYGEN SATURATION: 98 %

## 2020-11-12 PROCEDURE — 99072 ADDL SUPL MATRL&STAF TM PHE: CPT

## 2020-11-12 PROCEDURE — 99213 OFFICE O/P EST LOW 20 MIN: CPT | Mod: 25

## 2020-11-12 PROCEDURE — 10005 FNA BX W/US GDN 1ST LES: CPT

## 2020-11-12 NOTE — ASSESSMENT
[FreeTextEntry1] : 35 year old male s/p FNA of right lower pole 2.5 cm nodule (Mixed) \par \par -Saved for affirma \par -Will be called back with results\par -Follow up with Dr. Driver

## 2020-11-12 NOTE — PHYSICAL EXAM
[Alert] : alert [Well Nourished] : well nourished [No Acute Distress] : no acute distress [Normal Sclera/Conjunctiva] : normal sclera/conjunctiva [EOMI] : extra ocular movement intact [Normal Outer Ear/Nose] : the ears and nose were normal in appearance [Normal Hearing] : hearing was normal [Normal TMs] : both tympanic membranes were normal [No Neck Mass] : no neck mass was observed [Thyroid Not Enlarged] : the thyroid was not enlarged [No Respiratory Distress] : no respiratory distress [Clear to Auscultation] : lungs were clear to auscultation bilaterally [Normal S1, S2] : normal S1 and S2 [Normal Rate] : heart rate was normal [Regular Rhythm] : with a regular rhythm [Normal Bowel Sounds] : normal bowel sounds [Not Tender] : non-tender [Soft] : abdomen soft [Normal Gait] : normal gait [No Clubbing, Cyanosis] : no clubbing  or cyanosis of the fingernails [No Joint Swelling] : no joint swelling seen [Normal Strength/Tone] : muscle strength and tone were normal [No Rash] : no rash [No Skin Lesions] : no skin lesions [No Motor Deficits] : the motor exam was normal [Normal Reflexes] : deep tendon reflexes were 2+ and symmetric [No Tremors] : no tremors [Oriented x3] : oriented to person, place, and time [Normal Affect] : the affect was normal [Normal Insight/Judgement] : insight and judgment were intact [Normal Mood] : the mood was normal

## 2020-11-12 NOTE — PROCEDURE
[Fine Needle Aspiration] : Fine needle aspiration ~T ~C was performed. [Area of Mass: ______] : mass identified in the [unfilled] [Risks] : risks [Patient] : the patient [Benefits] : benefits [Consent Obtained] : Written consent was obtained prior to the procedure and is detailed in the patient's record [Supine] : The patient was placed in the supine position with the neck extended as tolerated. [Alcohol] : with alcohol [25 gauge 1.5 inch] : A 25 gauge 1.5 inch needle was used [Ultrasonic Guidance] : ultrasound guidance was employed [Sent to Histology] : The specimens were prepared in the usual manner and sent to histology. [Afirma] : Afirma [Tolerated Well] : the patient tolerated the procedure well [Vital Signs Stable] : the vital signs were stable [No Complications] : There were no complications [FreeTextEntry1] : 35 year old male s/p FNA of right lower pole 2.5 cm nodule (Mixed) \par \par -Saved for affirma \par -Will be called back with results\par -Follow up with Dr. Driver

## 2020-11-12 NOTE — HISTORY OF PRESENT ILLNESS
[FreeTextEntry1] : 35 year old male here for follow up of thyroid nodule \par \par Noted to have thyroid enlargement and then subsequently found to have a 2.5 cm nodule on the right. \par Nodule is mixed with isoechoic solid component \par No recent TFTs\par \par No family history of thyroid cancer. No previous head or neck radiation exposure \par

## 2020-11-16 LAB — FNA, THYROID: NORMAL

## 2020-11-19 ENCOUNTER — APPOINTMENT (OUTPATIENT)
Dept: SURGERY | Facility: CLINIC | Age: 35
End: 2020-11-19
Payer: MEDICAID

## 2020-11-19 VITALS
DIASTOLIC BLOOD PRESSURE: 93 MMHG | WEIGHT: 315 LBS | BODY MASS INDEX: 42.66 KG/M2 | SYSTOLIC BLOOD PRESSURE: 132 MMHG | HEIGHT: 72 IN | HEART RATE: 123 BPM

## 2020-11-19 PROCEDURE — 99203 OFFICE O/P NEW LOW 30 MIN: CPT

## 2020-11-22 NOTE — CONSULT LETTER
[Dear  ___] : Dear  [unfilled], [Consult Letter:] : I had the pleasure of evaluating your patient, [unfilled]. [Please see my note below.] : Please see my note below. [Consult Closing:] : Thank you very much for allowing me to participate in the care of this patient.  If you have any questions, please do not hesitate to contact me. [FreeTextEntry2] : Dr. Michael Driver [FreeTextEntry3] : Sincerely yours,\par \par Maria Elena Pereyra MD, FACS\par Assistant Professor of Surgery\par [facility]

## 2020-11-22 NOTE — PHYSICAL EXAM
[de-identified] : no cervical or supraclavicular adenopathy, trachea midline, thyroid without enlargement, with right 3cm firm palpable mass [Normal] : no neck adenopathy [FreeTextEntry1] : morbidly obese [de-identified] : Skin:  normal appearance.  no rash, nodules, vesicles, or erythema,\par Musculoskeletal:  full range of motion and no deformities appreciated\par Neurological:  grossly intact\par Psychiatric:  oriented to person, place and time with appropriate affect

## 2020-11-22 NOTE — REASON FOR VISIT
[Initial Consultation] : an initial consultation for [FreeTextEntry2] : papillary thyroid cancer [Other: _____] : [unfilled]

## 2020-11-22 NOTE — HISTORY OF PRESENT ILLNESS
[de-identified] : Patient referred by Dr. Driver for evaluation of papillary thyroid cancer. During recent physical examination, patient was noted to have a right thyroid mass. Patient denies prior history of thyroid disease, dysphagia, change in voice or radiation exposure. Thyroid ultrasound September 2020: Right lobe 6.5 x 2.7 x 2.8 CM with 2.5 x 1.7 x 2.4 CM nodule. Left lobe by 0.8 x 1.7 x 2.3 CM, no discrete nodules. No suspicious lymph nodes. Biopsy, right nodule positive for papillary thyroid cancer.

## 2020-11-22 NOTE — ASSESSMENT
[FreeTextEntry1] : Patient with newly diagnosed papillary thyroid cancer. I have recommended a total thyroidectomy with central neck dissection.  I have reviewed the pathophysiology of the disease process, the area anatomy and the rationale for surgery.  I have discussed the risks, benefits and alternative treatments which include but are not limited to bleeding, infection, numbness, hoarseness, hypocalcemia, scarring, and need for reoperation. I have answered the patient's questions to their satisfaction. The patient wishes to proceed with recommended procedure.They will contact my office to schedule surgery.

## 2020-12-01 ENCOUNTER — OUTPATIENT (OUTPATIENT)
Dept: OUTPATIENT SERVICES | Facility: HOSPITAL | Age: 35
LOS: 1 days | End: 2020-12-01
Payer: MEDICAID

## 2020-12-01 VITALS
HEIGHT: 72 IN | HEART RATE: 92 BPM | RESPIRATION RATE: 18 BRPM | DIASTOLIC BLOOD PRESSURE: 73 MMHG | TEMPERATURE: 98 F | WEIGHT: 315 LBS | SYSTOLIC BLOOD PRESSURE: 119 MMHG

## 2020-12-01 DIAGNOSIS — C73 MALIGNANT NEOPLASM OF THYROID GLAND: ICD-10-CM

## 2020-12-01 DIAGNOSIS — E11.9 TYPE 2 DIABETES MELLITUS WITHOUT COMPLICATIONS: ICD-10-CM

## 2020-12-01 DIAGNOSIS — Z01.818 ENCOUNTER FOR OTHER PREPROCEDURAL EXAMINATION: ICD-10-CM

## 2020-12-01 DIAGNOSIS — Z98.890 OTHER SPECIFIED POSTPROCEDURAL STATES: Chronic | ICD-10-CM

## 2020-12-01 DIAGNOSIS — E66.01 MORBID (SEVERE) OBESITY DUE TO EXCESS CALORIES: ICD-10-CM

## 2020-12-01 LAB
ANION GAP SERPL CALC-SCNC: 3 MMOL/L — LOW (ref 5–17)
BUN SERPL-MCNC: 15 MG/DL — SIGNIFICANT CHANGE UP (ref 7–23)
CALCIUM SERPL-MCNC: 9.6 MG/DL — SIGNIFICANT CHANGE UP (ref 8.4–10.5)
CHLORIDE SERPL-SCNC: 103 MMOL/L — SIGNIFICANT CHANGE UP (ref 96–108)
CO2 SERPL-SCNC: 28 MMOL/L — SIGNIFICANT CHANGE UP (ref 22–31)
CREAT SERPL-MCNC: 1 MG/DL — SIGNIFICANT CHANGE UP (ref 0.5–1.3)
GLUCOSE SERPL-MCNC: 96 MG/DL — SIGNIFICANT CHANGE UP (ref 70–99)
HCT VFR BLD CALC: 45.7 % — SIGNIFICANT CHANGE UP (ref 39–50)
HGB BLD-MCNC: 15.4 G/DL — SIGNIFICANT CHANGE UP (ref 13–17)
MCHC RBC-ENTMCNC: 29.2 PG — SIGNIFICANT CHANGE UP (ref 27–34)
MCHC RBC-ENTMCNC: 33.7 GM/DL — SIGNIFICANT CHANGE UP (ref 32–36)
MCV RBC AUTO: 86.7 FL — SIGNIFICANT CHANGE UP (ref 80–100)
NRBC # BLD: 0 /100 WBCS — SIGNIFICANT CHANGE UP (ref 0–0)
PLATELET # BLD AUTO: 196 K/UL — SIGNIFICANT CHANGE UP (ref 150–400)
POTASSIUM SERPL-MCNC: 3.6 MMOL/L — SIGNIFICANT CHANGE UP (ref 3.5–5.3)
POTASSIUM SERPL-SCNC: 3.6 MMOL/L — SIGNIFICANT CHANGE UP (ref 3.5–5.3)
RBC # BLD: 5.27 M/UL — SIGNIFICANT CHANGE UP (ref 4.2–5.8)
RBC # FLD: 12.6 % — SIGNIFICANT CHANGE UP (ref 10.3–14.5)
SODIUM SERPL-SCNC: 134 MMOL/L — LOW (ref 135–145)
WBC # BLD: 6.66 K/UL — SIGNIFICANT CHANGE UP (ref 3.8–10.5)
WBC # FLD AUTO: 6.66 K/UL — SIGNIFICANT CHANGE UP (ref 3.8–10.5)

## 2020-12-01 PROCEDURE — 36415 COLL VENOUS BLD VENIPUNCTURE: CPT

## 2020-12-01 PROCEDURE — 84443 ASSAY THYROID STIM HORMONE: CPT

## 2020-12-01 PROCEDURE — 84436 ASSAY OF TOTAL THYROXINE: CPT

## 2020-12-01 PROCEDURE — 84479 ASSAY OF THYROID (T3 OR T4): CPT

## 2020-12-01 PROCEDURE — G0463: CPT

## 2020-12-01 PROCEDURE — 85027 COMPLETE CBC AUTOMATED: CPT

## 2020-12-01 PROCEDURE — 80048 BASIC METABOLIC PNL TOTAL CA: CPT

## 2020-12-01 NOTE — H&P PST ADULT - HISTORY OF PRESENT ILLNESS
36 y/o male with DM type 2 presents to [facility] for scheduled total thyroidectomy with central neck dissection on 12/7/20. Patient states during appointment with endocrinologist for diabetes type 2 thyroid nodule noted and was referred for biopsy with abnormal results.

## 2020-12-01 NOTE — H&P PST ADULT - ASSESSMENT
36 y/o male with DM type 2 presents to [facility] for scheduled total thyroidectomy with central neck dissection on 12/7/20.

## 2020-12-01 NOTE — H&P PST ADULT - NSICDXPROBLEM_GEN_ALL_CORE_FT
PROBLEM DIAGNOSES  Problem: Thyroid malignant neoplasm  Assessment and Plan: Pre op and Hibiclens instructions given and explained.  Avoid NSAIDs and OTC supplements.   Patient verbalized understanding  medical consult requested by surgeron scheduled for 12/4/20  advised to self quarantine covid19 scheduled for 12/4/20    Problem: Type 2 diabetes mellitus  Assessment and Plan: A1c appreciated in the chart from 9/2020  lantus 26 units the night prior to surgery   Fingerstick am of surgery

## 2020-12-01 NOTE — H&P PST ADULT - NSICDXPASTMEDICALHX_GEN_ALL_CORE_FT
PAST MEDICAL HISTORY:  History of heartburn     Morbid obesity with body mass index (BMI) of 40.0 to 49.9     Thyroid malignant neoplasm     Type 2 diabetes mellitus

## 2020-12-01 NOTE — H&P PST ADULT - HEALTH CARE MAINTENANCE
no recent travels outside of the country or states within the last 14 days, no fever, URI, SOB or recent change /loss in smell or taste  no flu vaccine

## 2020-12-02 LAB
FT4I SERPL CALC-MCNC: 2.9 FTI% — SIGNIFICANT CHANGE UP (ref 1.4–4.8)
FT4I SERPL CALC-MCNC: 8.2 INDEX — SIGNIFICANT CHANGE UP (ref 4.4–11.4)
T3/T3 UPTAKE INDEX SERPL-RTO: 37 % — SIGNIFICANT CHANGE UP (ref 28–41)
T4 AB SER-ACNC: 7.9 UG/DL — SIGNIFICANT CHANGE UP (ref 4.6–12)
T4/T3 UPTAKE INDEX SERPL: 1 TBI — SIGNIFICANT CHANGE UP (ref 0.8–1.3)
TSH SERPL-MCNC: 1.14 UIU/ML — SIGNIFICANT CHANGE UP (ref 0.27–4.2)

## 2020-12-03 DIAGNOSIS — Z01.818 ENCOUNTER FOR OTHER PREPROCEDURAL EXAMINATION: ICD-10-CM

## 2020-12-04 ENCOUNTER — APPOINTMENT (OUTPATIENT)
Dept: DISASTER EMERGENCY | Facility: CLINIC | Age: 35
End: 2020-12-04

## 2020-12-04 PROBLEM — C73 MALIGNANT NEOPLASM OF THYROID GLAND: Chronic | Status: ACTIVE | Noted: 2020-12-01

## 2020-12-04 PROBLEM — E11.9 TYPE 2 DIABETES MELLITUS WITHOUT COMPLICATIONS: Chronic | Status: ACTIVE | Noted: 2020-12-01

## 2020-12-05 LAB — SARS-COV-2 N GENE NPH QL NAA+PROBE: NOT DETECTED

## 2020-12-06 ENCOUNTER — TRANSCRIPTION ENCOUNTER (OUTPATIENT)
Age: 35
End: 2020-12-06

## 2020-12-07 ENCOUNTER — OUTPATIENT (OUTPATIENT)
Dept: OUTPATIENT SERVICES | Facility: HOSPITAL | Age: 35
LOS: 1 days | End: 2020-12-07
Payer: MEDICAID

## 2020-12-07 ENCOUNTER — APPOINTMENT (OUTPATIENT)
Dept: SURGERY | Facility: HOSPITAL | Age: 35
End: 2020-12-07

## 2020-12-07 ENCOUNTER — RESULT REVIEW (OUTPATIENT)
Age: 35
End: 2020-12-07

## 2020-12-07 VITALS
WEIGHT: 315 LBS | TEMPERATURE: 98 F | SYSTOLIC BLOOD PRESSURE: 156 MMHG | HEIGHT: 72 IN | HEART RATE: 86 BPM | RESPIRATION RATE: 18 BRPM | OXYGEN SATURATION: 97 % | DIASTOLIC BLOOD PRESSURE: 82 MMHG

## 2020-12-07 VITALS
OXYGEN SATURATION: 96 % | DIASTOLIC BLOOD PRESSURE: 83 MMHG | HEART RATE: 96 BPM | RESPIRATION RATE: 16 BRPM | SYSTOLIC BLOOD PRESSURE: 117 MMHG | TEMPERATURE: 98 F

## 2020-12-07 DIAGNOSIS — C73 MALIGNANT NEOPLASM OF THYROID GLAND: ICD-10-CM

## 2020-12-07 DIAGNOSIS — Z98.890 OTHER SPECIFIED POSTPROCEDURAL STATES: Chronic | ICD-10-CM

## 2020-12-07 LAB — GLUCOSE BLDC GLUCOMTR-MCNC: 101 MG/DL — HIGH (ref 70–99)

## 2020-12-07 PROCEDURE — 60252 REMOVAL OF THYROID: CPT

## 2020-12-07 PROCEDURE — 88307 TISSUE EXAM BY PATHOLOGIST: CPT | Mod: 26

## 2020-12-07 PROCEDURE — 88307 TISSUE EXAM BY PATHOLOGIST: CPT

## 2020-12-07 PROCEDURE — 82962 GLUCOSE BLOOD TEST: CPT

## 2020-12-07 PROCEDURE — 60252 REMOVAL OF THYROID: CPT | Mod: AS

## 2020-12-07 RX ORDER — SODIUM CHLORIDE 9 MG/ML
1000 INJECTION, SOLUTION INTRAVENOUS
Refills: 0 | Status: DISCONTINUED | OUTPATIENT
Start: 2020-12-07 | End: 2020-12-07

## 2020-12-07 RX ORDER — SODIUM CHLORIDE 9 MG/ML
1000 INJECTION, SOLUTION INTRAVENOUS
Refills: 0 | Status: DISCONTINUED | OUTPATIENT
Start: 2020-12-07 | End: 2020-12-21

## 2020-12-07 RX ORDER — ONDANSETRON 8 MG/1
4 TABLET, FILM COATED ORAL ONCE
Refills: 0 | Status: DISCONTINUED | OUTPATIENT
Start: 2020-12-07 | End: 2020-12-07

## 2020-12-07 RX ORDER — OXYCODONE HYDROCHLORIDE 5 MG/1
5 TABLET ORAL EVERY 6 HOURS
Refills: 0 | Status: DISCONTINUED | OUTPATIENT
Start: 2020-12-07 | End: 2020-12-07

## 2020-12-07 RX ORDER — BENZOCAINE AND MENTHOL 5; 1 G/100ML; G/100ML
1 LIQUID ORAL
Refills: 0 | Status: DISCONTINUED | OUTPATIENT
Start: 2020-12-07 | End: 2020-12-21

## 2020-12-07 RX ORDER — BENZOCAINE AND MENTHOL 5; 1 G/100ML; G/100ML
1 LIQUID ORAL
Qty: 0 | Refills: 0 | DISCHARGE
Start: 2020-12-07

## 2020-12-07 RX ORDER — ASPIRIN/CALCIUM CARB/MAGNESIUM 324 MG
1 TABLET ORAL
Qty: 0 | Refills: 0 | DISCHARGE

## 2020-12-07 RX ORDER — HYDROMORPHONE HYDROCHLORIDE 2 MG/ML
0.5 INJECTION INTRAMUSCULAR; INTRAVENOUS; SUBCUTANEOUS ONCE
Refills: 0 | Status: DISCONTINUED | OUTPATIENT
Start: 2020-12-07 | End: 2020-12-07

## 2020-12-07 RX ORDER — HYDROMORPHONE HYDROCHLORIDE 2 MG/ML
0.25 INJECTION INTRAMUSCULAR; INTRAVENOUS; SUBCUTANEOUS ONCE
Refills: 0 | Status: DISCONTINUED | OUTPATIENT
Start: 2020-12-07 | End: 2020-12-07

## 2020-12-07 RX ADMIN — SODIUM CHLORIDE 50 MILLILITER(S): 9 INJECTION, SOLUTION INTRAVENOUS at 06:50

## 2020-12-07 RX ADMIN — BENZOCAINE AND MENTHOL 1 LOZENGE: 5; 1 LIQUID ORAL at 14:37

## 2020-12-07 RX ADMIN — HYDROMORPHONE HYDROCHLORIDE 0.25 MILLIGRAM(S): 2 INJECTION INTRAMUSCULAR; INTRAVENOUS; SUBCUTANEOUS at 11:10

## 2020-12-07 RX ADMIN — OXYCODONE HYDROCHLORIDE 5 MILLIGRAM(S): 5 TABLET ORAL at 14:49

## 2020-12-07 RX ADMIN — HYDROMORPHONE HYDROCHLORIDE 0.5 MILLIGRAM(S): 2 INJECTION INTRAMUSCULAR; INTRAVENOUS; SUBCUTANEOUS at 10:41

## 2020-12-07 RX ADMIN — HYDROMORPHONE HYDROCHLORIDE 0.25 MILLIGRAM(S): 2 INJECTION INTRAMUSCULAR; INTRAVENOUS; SUBCUTANEOUS at 10:55

## 2020-12-07 RX ADMIN — Medication 2 TABLET(S): at 14:12

## 2020-12-07 RX ADMIN — HYDROMORPHONE HYDROCHLORIDE 0.5 MILLIGRAM(S): 2 INJECTION INTRAMUSCULAR; INTRAVENOUS; SUBCUTANEOUS at 10:55

## 2020-12-07 NOTE — PRE-ANESTHESIA EVALUATION ADULT - NSDENTALSD_ENT_ALL_CORE
Misshappen lower front teeth. Missing upper rear right teeth. No loose teeth./appears normal and intact

## 2020-12-07 NOTE — ASU DISCHARGE PLAN (ADULT/PEDIATRIC) - ASU DC SPECIAL INSTRUCTIONSFT
Patient is to be discharged home when criteria is met   Follow up with Dr Pereyra in the office tomorrow , call for apt time in am   Teach HEMANT drainage instructions , patient is to be discharged home from [facility] with drain in place   take tylenol for relief of pain

## 2020-12-07 NOTE — ASU DISCHARGE PLAN (ADULT/PEDIATRIC) - NURSING INSTRUCTIONS
DRINK PLENTY OF FLUIDS,  TAKE TYLENOL FOR PAIN AS NEEDED,  USE ICE PACKS 15-20 MIN ON/OFF AS NEEDED FOR PAIN AND EDEMA CONTROL,  TAKE CALCIUM 2TABS THREE TIMES A DAY

## 2020-12-07 NOTE — ASU PATIENT PROFILE, ADULT - PMH
History of heartburn    Morbid obesity with body mass index (BMI) of 40.0 to 49.9    Thyroid malignant neoplasm    Type 2 diabetes mellitus

## 2020-12-07 NOTE — ASU PATIENT PROFILE, ADULT - P[facility]
History of tonsillectomy and adenoidectomy     History of tonsillectomy and adenoidectomy  as a child

## 2020-12-07 NOTE — ASU DISCHARGE PLAN (ADULT/PEDIATRIC) - CARE PROVIDER_API CALL
Maria Elena Pereyra  SURGERY  [address] [place] [address], [address]  [place] [address]  Phone: [phone]  Fax: [phone]  Scheduled Appointment: 12/08/2020 11:00 AM

## 2020-12-08 ENCOUNTER — APPOINTMENT (OUTPATIENT)
Dept: SURGERY | Facility: CLINIC | Age: 35
End: 2020-12-08
Payer: MEDICAID

## 2020-12-08 PROCEDURE — 99024 POSTOP FOLLOW-UP VISIT: CPT

## 2020-12-08 NOTE — HISTORY OF PRESENT ILLNESS
[de-identified] : Patient referred by Dr. Driver for evaluation of papillary thyroid cancer. During recent physical examination, patient was noted to have a right thyroid mass. Patient denies prior history of thyroid disease, dysphagia, change in voice or radiation exposure. Thyroid ultrasound September 2020: Right lobe 6.5 x 2.7 x 2.8 CM with 2.5 x 1.7 x 2.4 CM nodule. Left lobe by 0.8 x 1.7 x 2.3 CM, no discrete nodules. No suspicious lymph nodes. Biopsy, right nodule positive for papillary thyroid cancer.\par 12/7/20 total thyroidectomy with central neck dissection.  denies dysphagia , hoarseness or para thesias.  HEMANT serous

## 2020-12-08 NOTE — ASSESSMENT
[FreeTextEntry1] : Patient with newly diagnosed papillary thyroid cancer. Doing well postop.  RTO 1 week

## 2020-12-08 NOTE — PHYSICAL EXAM
[de-identified] : dressing intact, HEMANT removed.  no cervical or supraclavicular adenopathy, trachea midline,  [Normal] : no neck adenopathy [FreeTextEntry1] : morbidly obese [de-identified] : Skin:  normal appearance.  no rash, nodules, vesicles, or erythema,\par Musculoskeletal:  full range of motion and no deformities appreciated\par Neurological:  grossly intact\par Psychiatric:  oriented to person, place and time with appropriate affect

## 2020-12-10 LAB — SURGICAL PATHOLOGY STUDY: SIGNIFICANT CHANGE UP

## 2020-12-15 ENCOUNTER — APPOINTMENT (OUTPATIENT)
Dept: SURGERY | Facility: CLINIC | Age: 35
End: 2020-12-15
Payer: MEDICAID

## 2020-12-15 ENCOUNTER — RX RENEWAL (OUTPATIENT)
Age: 35
End: 2020-12-15

## 2020-12-15 PROCEDURE — 99024 POSTOP FOLLOW-UP VISIT: CPT

## 2020-12-15 RX ORDER — PEN NEEDLE, DIABETIC 29 G X1/2"
32G X 4 MM NEEDLE, DISPOSABLE MISCELLANEOUS
Qty: 200 | Refills: 3 | Status: ACTIVE | COMMUNITY
Start: 2019-11-18 | End: 1900-01-01

## 2020-12-15 NOTE — PHYSICAL EXAM
[de-identified] : incision healing with min swelling, scar min discussed. .  no cervical or supraclavicular adenopathy, trachea midline,  [Normal] : no neck adenopathy [FreeTextEntry1] : morbidly obese [de-identified] : Skin:  normal appearance.  no rash, nodules, vesicles, or erythema,\par Musculoskeletal:  full range of motion and no deformities appreciated\par Neurological:  grossly intact\par Psychiatric:  oriented to person, place and time with appropriate affect

## 2020-12-15 NOTE — HISTORY OF PRESENT ILLNESS
[de-identified] : Patient referred by Dr. Driver for evaluation of papillary thyroid cancer. During recent physical examination, patient was noted to have a right thyroid mass. Patient denies prior history of thyroid disease, dysphagia, change in voice or radiation exposure. Thyroid ultrasound September 2020: Right lobe 6.5 x 2.7 x 2.8 CM with 2.5 x 1.7 x 2.4 CM nodule. Left lobe by 0.8 x 1.7 x 2.3 CM, no discrete nodules. No suspicious lymph nodes. Biopsy, right nodule positive for papillary thyroid cancer.\par 12/7/20 total thyroidectomy with central neck dissection.  Path 3.1 cm papillary thyroid cancer with 3/5 LN positive.  denies dysphagia , hoarseness or parathesias.

## 2020-12-15 NOTE — ASSESSMENT
[FreeTextEntry1] : Patient with newly diagnosed papillary thyroid cancer with LN involvement.  Recommend FRYE treatment.  Doing well postop.  will start levothyroxine, scheduled  to see Dr Driver in one month. RTO 4 mo

## 2021-01-06 ENCOUNTER — APPOINTMENT (OUTPATIENT)
Dept: ENDOCRINOLOGY | Facility: CLINIC | Age: 36
End: 2021-01-06

## 2021-01-19 ENCOUNTER — RX RENEWAL (OUTPATIENT)
Age: 36
End: 2021-01-19

## 2021-02-23 ENCOUNTER — APPOINTMENT (OUTPATIENT)
Dept: ENDOCRINOLOGY | Facility: CLINIC | Age: 36
End: 2021-02-23
Payer: MEDICAID

## 2021-02-23 LAB — GLUCOSE BLDC GLUCOMTR-MCNC: 157

## 2021-02-23 PROCEDURE — 82962 GLUCOSE BLOOD TEST: CPT

## 2021-02-23 PROCEDURE — 99215 OFFICE O/P EST HI 40 MIN: CPT | Mod: 25

## 2021-02-23 PROCEDURE — 99072 ADDL SUPL MATRL&STAF TM PHE: CPT

## 2021-02-23 NOTE — PHYSICAL EXAM
[Alert] : alert [Well Nourished] : well nourished [No Acute Distress] : no acute distress [Well Developed] : well developed [Normal Sclera/Conjunctiva] : normal sclera/conjunctiva [EOMI] : extra ocular movement intact [No Proptosis] : no proptosis [Normal Oropharynx] : the oropharynx was normal [Well Healed Scar] : well healed scar [No Respiratory Distress] : no respiratory distress [No Accessory Muscle Use] : no accessory muscle use [Clear to Auscultation] : lungs were clear to auscultation bilaterally [Normal S1, S2] : normal S1 and S2 [Normal Rate] : heart rate was normal [Regular Rhythm] : with a regular rhythm [No Edema] : no peripheral edema [Pedal Pulses Normal] : the pedal pulses are present [Normal Bowel Sounds] : normal bowel sounds [Not Tender] : non-tender [Not Distended] : not distended [Soft] : abdomen soft [Normal Anterior Cervical Nodes] : no anterior cervical lymphadenopathy [No Spinal Tenderness] : no spinal tenderness [Spine Straight] : spine straight [No Stigmata of Cushings Syndrome] : no stigmata of Cushings Syndrome [Normal Gait] : normal gait [Normal Strength/Tone] : muscle strength and tone were normal [No Rash] : no rash [Acanthosis Nigricans] : no acanthosis nigricans [Normal Reflexes] : deep tendon reflexes were 2+ and symmetric [No Tremors] : no tremors [Oriented x3] : oriented to person, place, and time

## 2021-02-23 NOTE — ASSESSMENT
[FreeTextEntry1] : Patient is a 34-year-old male with history of diabetic ketoacidosis in the setting of newly diagnosed type 2 diabetes mellitus, prior antibody workup includes negative HAM, negative islet cell antibody; here for endocrinology followup after his discharge from the hospital.\par \par #1 type 2 diabetes mellitus\par He was using the Craig sensor, glucose mostly in target. \par A1C is 5.4%\par Patient was able to discontinue mealtime insulin. \par Continue with Lantus 32 untis at bedtime. \par Patient is currently on Ozempic 0.5 mg once weekly and tolerating well. \par We will consider increasing the dose to 1 mg once weekly. \par Patient continues to have terrible side effects, abdominal cramp, with metformin, even 500 mg once daily.  Also seeing the whole pill being excreted through the stool.  Therefore recommend to discontinue metformin\par Will discontinue mealtime insulin and transition to GLP-1.\par Continue with Ozempic and increase the dose to 1 mg once weekly.\par Follow-up with ophthalmology needed, will call to schedule \par Follow-up with podiatry both appointment has not been made due to current pandemic\par \par #2 Morbid Obesity\par BMI 46.1 kg/m2\par Discussed weight loss surgery with patient.  We'll give referral to bariatric surgery.  Patient is somewhat hesitant because his sister had a terrible experience increased.  He also had a relative who  from complications of weight loss surgery.\par \par #3 hyperlipidemia\par  mg/dL started on statin therapy with atorvastatin 10mg once daily \par \par #4 blood pressure, 122/82\par Blood pressure currently on target, microalbumin check in 2020 8 mg/g.\par \par #5 PTC\par Patient is status post total thyroidectomy in 2020.  Papillary thyroid cancer involving right lobe, classic variant with focal infiltrative follicular differentiation, 3.1 cm in greatest dimension.  3 out of 5 lymph nodes positive for metastatic carcinoma.  Patient underwent central neck dissection for the surgery.\par Recommend to proceed with radioactive iodine for remnant ablation.  Referral was given to patient.\par He will schedule an appointment with nuclear medicine as soon as possible.\par

## 2021-02-23 NOTE — HISTORY OF PRESENT ILLNESS
[FreeTextEntry1] : 35 [FreeTextEntry2] : Less than 0.1 [de-identified] : December 2020 [de-identified] : Total thyroidectomy with central neck dissection [de-identified] : Tumor size:\par Greatest dimension: 3.1 cm, additional dimension 2.2 x 1.9 cm\par Histologic type: Papillary thyroid carcinoma, classic variant with focal infiltrative follicular differentiation.\par Margin: Uninvolved by carcinoma.\par Angioinvasion: Not identified\par Lymphatic invasion: Not identified.\par Perineural invasion: Not identified.\par Mitotic rate, less than 1 pro time per power field.\par Extrathyroidal extension: Not identified.\par \par 3 of the 5 lymph node positive for metastatic carcinoma.  The largest metastatic focus measures 0.5 cm in greatest dimension.

## 2021-03-02 LAB
CALCIUM SERPL-MCNC: 9.2 MG/DL
PARATHYROID HORMONE INTACT: 36 PG/ML
T4 FREE SERPL-MCNC: 1.9 NG/DL
THYROGLOB AB SERPL-ACNC: <20 IU/ML
THYROGLOB SERPL-MCNC: 0.29 NG/ML
TSH SERPL-ACNC: 4.07 UIU/ML

## 2021-03-25 ENCOUNTER — RX RENEWAL (OUTPATIENT)
Age: 36
End: 2021-03-25

## 2021-04-08 ENCOUNTER — NON-APPOINTMENT (OUTPATIENT)
Age: 36
End: 2021-04-08

## 2021-04-16 ENCOUNTER — APPOINTMENT (OUTPATIENT)
Dept: ENDOCRINOLOGY | Facility: CLINIC | Age: 36
End: 2021-04-16

## 2021-05-06 ENCOUNTER — APPOINTMENT (OUTPATIENT)
Dept: SURGERY | Facility: CLINIC | Age: 36
End: 2021-05-06

## 2021-05-14 ENCOUNTER — RX RENEWAL (OUTPATIENT)
Age: 36
End: 2021-05-14

## 2021-05-20 ENCOUNTER — RX RENEWAL (OUTPATIENT)
Age: 36
End: 2021-05-20

## 2021-06-01 ENCOUNTER — APPOINTMENT (OUTPATIENT)
Dept: ENDOCRINOLOGY | Facility: CLINIC | Age: 36
End: 2021-06-01

## 2021-06-01 NOTE — ASSESSMENT
[FreeTextEntry1] : Patient is a 34-year-old male with history of diabetic ketoacidosis in the setting of newly diagnosed type 2 diabetes mellitus, prior antibody workup includes negative HAM, negative islet cell antibody; here for endocrinology followup after his discharge from the hospital.\par \par 1. Type 2 DM\par He was using the Craig sensor, glucose mostly in target. \par A1C is 5.4%\par Patient was able to discontinue mealtime insulin. \par Continue with Lantus 32 untis at bedtime. \par Patient is currently on Ozempic 0.5 mg once weekly and tolerating well. \par We will consider increasing the dose to 1 mg once weekly. \par Patient continues to have terrible side effects, abdominal cramp, with metformin, even 500 mg once daily.  Also seeing the whole pill being excreted through the stool.  Therefore recommend to discontinue metformin\par Will discontinue mealtime insulin and transition to GLP-1.\par Continue with Ozempic and increase the dose to 1 mg once weekly.\par Follow-up with ophthalmology needed, will call to schedule \par Follow-up with podiatry both appointment has not been made due to current pandemic\par \par 2. Morbid obesity \par BMI 46.1 kg/m2\par Discussed weight loss surgery with patient.  We'll give referral to bariatric surgery.  Patient is somewhat hesitant because his sister had a terrible experience increased.  He also had a relative who  from complications of weight loss surgery.\par \par 3. HLD\par  mg/dL started on statin therapy with atorvastatin 10mg once daily \par \par 4. Blood pressure management. \par Blood pressure currently on target, microalbumin check in 2020 8 mg/g.\par \par 5. Papillary thyroid cancer\par Patient is status post total thyroidectomy in 2020.  Papillary thyroid cancer involving right lobe, classic variant with focal infiltrative follicular differentiation, 3.1 cm in greatest dimension.  3 out of 5 lymph nodes positive for metastatic carcinoma.  Patient underwent central neck dissection for the surgery.\par Recommend to proceed with radioactive iodine for remnant ablation.  Referral was given to patient.\par He will schedule an appointment with nuclear medicine as soon as possible.\par

## 2021-07-07 NOTE — ED ADULT NURSE NOTE - CAS DISCH ACCOMP BY
Last office visit 6/11/2021  Medication(s) Requested: losartan 25 mg,2/18/2021 d/c'd by becky lew    Routing refill requests for nortriptyline and losartan to Dr. Hernandes to approve.     Family/EDT/Self/MD

## 2021-07-26 ENCOUNTER — NON-APPOINTMENT (OUTPATIENT)
Age: 36
End: 2021-07-26

## 2021-07-26 ENCOUNTER — RX RENEWAL (OUTPATIENT)
Age: 36
End: 2021-07-26

## 2021-07-26 RX ORDER — ROSUVASTATIN CALCIUM 10 MG/1
10 TABLET, FILM COATED ORAL
Qty: 30 | Refills: 5 | Status: DISCONTINUED | COMMUNITY
Start: 2020-03-12 | End: 2021-07-26

## 2021-11-01 ENCOUNTER — APPOINTMENT (OUTPATIENT)
Dept: NUCLEAR MEDICINE | Facility: HOSPITAL | Age: 36
End: 2021-11-01
Payer: MEDICAID

## 2021-11-01 ENCOUNTER — OUTPATIENT (OUTPATIENT)
Dept: OUTPATIENT SERVICES | Facility: HOSPITAL | Age: 36
LOS: 1 days | End: 2021-11-01
Payer: MEDICAID

## 2021-11-01 DIAGNOSIS — C73 MALIGNANT NEOPLASM OF THYROID GLAND: ICD-10-CM

## 2021-11-01 DIAGNOSIS — Z98.890 OTHER SPECIFIED POSTPROCEDURAL STATES: Chronic | ICD-10-CM

## 2021-11-01 PROCEDURE — 99202 OFFICE O/P NEW SF 15 MIN: CPT

## 2021-11-02 ENCOUNTER — APPOINTMENT (OUTPATIENT)
Dept: NUCLEAR MEDICINE | Facility: HOSPITAL | Age: 36
End: 2021-11-02

## 2021-11-03 ENCOUNTER — APPOINTMENT (OUTPATIENT)
Dept: NUCLEAR MEDICINE | Facility: HOSPITAL | Age: 36
End: 2021-11-03

## 2021-11-03 ENCOUNTER — RESULT REVIEW (OUTPATIENT)
Age: 36
End: 2021-11-03

## 2021-11-03 PROCEDURE — 79005 NUCLEAR RX ORAL ADMIN: CPT | Mod: 26

## 2021-11-09 ENCOUNTER — APPOINTMENT (OUTPATIENT)
Dept: NUCLEAR MEDICINE | Facility: HOSPITAL | Age: 36
End: 2021-11-09

## 2021-11-09 ENCOUNTER — RESULT REVIEW (OUTPATIENT)
Age: 36
End: 2021-11-09

## 2021-11-09 PROCEDURE — 96372 THER/PROPH/DIAG INJ SC/IM: CPT

## 2021-11-09 PROCEDURE — 78830 RP LOCLZJ TUM SPECT W/CT 1: CPT | Mod: 26

## 2021-11-09 PROCEDURE — 78018 THYROID MET IMAGING BODY: CPT

## 2021-11-09 PROCEDURE — 78018 THYROID MET IMAGING BODY: CPT | Mod: 26

## 2021-11-09 PROCEDURE — 79005 NUCLEAR RX ORAL ADMIN: CPT

## 2021-11-09 PROCEDURE — 78830 RP LOCLZJ TUM SPECT W/CT 1: CPT

## 2021-11-09 PROCEDURE — A9517: CPT

## 2022-01-18 ENCOUNTER — APPOINTMENT (OUTPATIENT)
Dept: ENDOCRINOLOGY | Facility: CLINIC | Age: 37
End: 2022-01-18
Payer: MEDICAID

## 2022-01-18 VITALS
TEMPERATURE: 97.2 F | WEIGHT: 315 LBS | OXYGEN SATURATION: 97 % | BODY MASS INDEX: 49.72 KG/M2 | SYSTOLIC BLOOD PRESSURE: 120 MMHG | DIASTOLIC BLOOD PRESSURE: 86 MMHG | HEART RATE: 105 BPM

## 2022-01-18 PROCEDURE — 82962 GLUCOSE BLOOD TEST: CPT

## 2022-01-18 PROCEDURE — 83036 HEMOGLOBIN GLYCOSYLATED A1C: CPT | Mod: QW

## 2022-01-18 PROCEDURE — 99215 OFFICE O/P EST HI 40 MIN: CPT

## 2022-01-18 RX ORDER — INSULIN ASPART 100 [IU]/ML
100 INJECTION, SOLUTION INTRAVENOUS; SUBCUTANEOUS
Qty: 6 | Refills: 1 | Status: DISCONTINUED | COMMUNITY
Start: 2019-10-14 | End: 2022-01-18

## 2022-01-18 RX ORDER — FLASH GLUCOSE SCANNING READER
EACH MISCELLANEOUS
Qty: 1 | Refills: 0 | Status: DISCONTINUED | COMMUNITY
Start: 2019-11-18 | End: 2022-01-18

## 2022-01-18 NOTE — ASSESSMENT
[FreeTextEntry1] : Patient is a 34-year-old male with history of diabetic ketoacidosis in the setting of newly diagnosed type 2 diabetes mellitus, prior antibody workup includes negative HAM, negative islet cell antibody; here for endocrinology followup after his discharge from the hospital.\par \par 1. Type 2 DM\par He was using the Craig sensor, glucose mostly in target. \par 2022 A1C 5.9%\par Patient was able to discontinue mealtime insulin. \par Continue with Lantus 56 untis at bedtime. \par Patient is currently on Ozempic 0.5 mg once weekly and tolerating well. \par We will consider increasing the dose to 1 mg once weekly. \par Patient continues to have terrible side effects, abdominal cramp, with metformin, even 500 mg once daily.  Also seeing the whole pill being excreted through the stool.  Therefore recommend to discontinue metformin\par Will discontinue mealtime insulin and transition to GLP-1.\par Continue with Ozempic and increase the dose to 1 mg once weekly.\par Follow-up with ophthalmology needed, will call to schedule \par Follow-up with podiatry both appointment has not been made due to current pandemic\par \par 2. Morbid obesity \par BMI 46.1 kg/m2\par Discussed weight loss surgery with patient.  We'll give referral to bariatric surgery.  Patient is somewhat hesitant because his sister had a terrible experience increased.  He also had a relative who  from complications of weight loss surgery.\par \par 3. HLD\par  mg/dL started on statin therapy with atorvastatin 10mg once daily \par \par 4. Blood pressure management. \par Blood pressure currently on target, microalbumin check in 2020 8 mg/g.\par Check microalbumin/creatinine ratio\par \par 5. Papillary thyroid cancer\par Patient is status post total thyroidectomy in 2020.  Papillary thyroid cancer involving right lobe, classic variant with focal infiltrative follicular differentiation, 3.1 cm in greatest dimension.  3 out of 5 lymph nodes positive for metastatic carcinoma.  Patient underwent central neck dissection for the surgery.\par Status post radioactive iodine treatment in 2021\par Post therapy scan shows iodine avid tissue in the anterior neck/thyroid bed.  Stimulated thyroglobulin was 5.95 ng/mL.\par Currently taking levothyroxine 225 mcg once daily\par Will check TSH, free T4 and thyroglobulin level today.\par TSH goal of less than 0.10  uIU/mL.\par  will obtain thyroid ultrasound.\par  [Diabetes Foot Care] : diabetes foot care [Long Term Vascular Complications] : long term vascular complications of diabetes [Carbohydrate Consistent Diet] : carbohydrate consistent diet [Importance of Diet and Exercise] : importance of diet and exercise to improve glycemic control, achieve weight loss and improve cardiovascular health [Exercise/Effect on Glucose] : exercise/effect on glucose [Hypoglycemia Management] : hypoglycemia management [Glucagon Use] : glucagon use [Ketone Testing] : ketone testing [Action and use of Insulin] : action and use of short and long-acting insulin [Self Monitoring of Blood Glucose] : self monitoring of blood glucose [Insulin Self-Administration] : insulin self-administration [Injection Technique, Storage, Sharps Disposal] : injection technique, storage, and sharps disposal [Sick-Day Management] : sick-day management [Retinopathy Screening] : Patient was referred to ophthalmology for retinopathy screening

## 2022-01-18 NOTE — PHYSICAL EXAM
[Alert] : alert [Well Nourished] : well nourished [No Acute Distress] : no acute distress [Well Developed] : well developed [Normal Sclera/Conjunctiva] : normal sclera/conjunctiva [EOMI] : extra ocular movement intact [No Proptosis] : no proptosis [Normal Oropharynx] : the oropharynx was normal [Well Healed Scar] : well healed scar [No Respiratory Distress] : no respiratory distress [No Accessory Muscle Use] : no accessory muscle use [Clear to Auscultation] : lungs were clear to auscultation bilaterally [Normal S1, S2] : normal S1 and S2 [Normal Rate] : heart rate was normal [Regular Rhythm] : with a regular rhythm [No Edema] : no peripheral edema [Pedal Pulses Normal] : the pedal pulses are present [Normal Bowel Sounds] : normal bowel sounds [Not Tender] : non-tender [Not Distended] : not distended [Soft] : abdomen soft [Normal Anterior Cervical Nodes] : no anterior cervical lymphadenopathy [No Spinal Tenderness] : no spinal tenderness [Spine Straight] : spine straight [No Stigmata of Cushings Syndrome] : no stigmata of Cushings Syndrome [Normal Gait] : normal gait [Normal Strength/Tone] : muscle strength and tone were normal [No Rash] : no rash [Acanthosis Nigricans] : no acanthosis nigricans [Normal] : normal [Full ROM] : with full range of motion [Diminished Throughout Both Feet] : normal tactile sensation with monofilament testing throughout both feet [Normal Reflexes] : deep tendon reflexes were 2+ and symmetric [No Tremors] : no tremors [Oriented x3] : oriented to person, place, and time

## 2022-01-18 NOTE — HISTORY OF PRESENT ILLNESS
[FreeTextEntry1] : Mr. ALBERTO CHRISTIANSON is 36 year old man here to follow-up for type 2 diabetes as well as recently diagnosed papillary thyroid cancer.\par \par Was discharged from hospital in Oct 2019, with newly diagnosed Type 2 Dm, A1C 11.3% (Sept/2019), EGFR 133. \par He was started on basal bolus regimen with Lantus 60 units and Novolog 22 units ac meals\par Last visit, given overall good A1c control of 5.7% in January 2020, mealtime insulin was discontinued.\par \par Currently taking: \par Patient is currently taking Basaglar 56 units at bedtime\par Unable to tolerate Metformin due to abdominal pain\par Ozempic 1.0 mg once weekly subcutaneously\par \par He is checking his glucose Craig sensor, he checks his glucose "very often".  \par \par 24 hour recall\par B: Egg, sometimes grilled cheese.  \par L: soup, greek food.  \par D: A burger, he only eats with a bun.  \par He doesn't snack. \par \par He had negative HAM, negative islet cell antibody.  \par \par He doesn't eat the same hours daily.  He works as a .  He eats dinner sometimes late at night.  \par \par Regarding cholesterol, patient denied prior history of elevated cholesterol, currently taking crestor 10mg daily.  \par \par Patient denies any history of hypertension as well.  \par \par Last visit in September 2020, patient was noted to have a thyroid nodule on exam.  Ultrasound was done, notable for 2.5 cm right thyroid nodule.  Fine-needle aspiration was done in November 2020, patient was found to have papillary thyroid cancer.  Patient underwent total thyroidectomy.  Found to have papillary thyroid cancer involving the right lobe, calcific variant with focal infiltrative follicular differentiation, 3.1 cm in greatest dimension, l there is no lymphovascular or perineural invasion.  The resection margins are negative for carcinoma.  3 of 5 lymph nodes positive for metastatic carcinoma, 3/5.  The largest metastatic focus was 0.5 cm in greatest dimension.  No extranodal extension identified.  Focal dystrophic calcifications.  \par \par Patient underwent radioactive iodine treatment with 93.2 mCi of I-131 sodium iodine capsule in Nov 2021. \par \par Post therapy whole-body I-131 scan demonstrated iodine avid tissue in the anterior neck/thyroid bed.  Stimulated thyroglobulin was 5.95 ng/mm.  There is no evidence of distant functioning metastases.\par \par Patient has not follow-up with Dr. Pereyra.\par \par He is currently taking levothyroxine 250 mcg once daily.\par \par

## 2022-01-28 LAB
25(OH)D3 SERPL-MCNC: 13.1 NG/ML
ALBUMIN SERPL ELPH-MCNC: 4.7 G/DL
ALP BLD-CCNC: 59 U/L
ALT SERPL-CCNC: 77 U/L
ANION GAP SERPL CALC-SCNC: 16 MMOL/L
AST SERPL-CCNC: 43 U/L
BILIRUB SERPL-MCNC: 0.3 MG/DL
BUN SERPL-MCNC: 15 MG/DL
CALCIUM SERPL-MCNC: 9.1 MG/DL
CHLORIDE SERPL-SCNC: 103 MMOL/L
CHOLEST SERPL-MCNC: 179 MG/DL
CO2 SERPL-SCNC: 22 MMOL/L
CREAT SERPL-MCNC: 0.89 MG/DL
CREAT SPEC-SCNC: 156 MG/DL
GLUCOSE BLDC GLUCOMTR-MCNC: 211
GLUCOSE SERPL-MCNC: 182 MG/DL
HBA1C MFR BLD HPLC: 5.9
HDLC SERPL-MCNC: 31 MG/DL
LDLC SERPL CALC-MCNC: 102 MG/DL
MICROALBUMIN 24H UR DL<=1MG/L-MCNC: <1.2 MG/DL
MICROALBUMIN/CREAT 24H UR-RTO: NORMAL MG/G
NONHDLC SERPL-MCNC: 147 MG/DL
POTASSIUM SERPL-SCNC: 4.4 MMOL/L
PROT SERPL-MCNC: 7.4 G/DL
SODIUM SERPL-SCNC: 140 MMOL/L
T4 FREE SERPL-MCNC: 2 NG/DL
THYROGLOB AB SERPL-ACNC: <20 IU/ML
THYROGLOB SERPL-MCNC: <0.2 NG/ML
TRIGL SERPL-MCNC: 228 MG/DL
TSH SERPL-ACNC: 1.1 UIU/ML

## 2022-02-01 ENCOUNTER — RX CHANGE (OUTPATIENT)
Age: 37
End: 2022-02-01

## 2022-02-02 ENCOUNTER — RX RENEWAL (OUTPATIENT)
Age: 37
End: 2022-02-02

## 2022-04-10 ENCOUNTER — RX RENEWAL (OUTPATIENT)
Age: 37
End: 2022-04-10

## 2022-07-18 ENCOUNTER — APPOINTMENT (OUTPATIENT)
Dept: ENDOCRINOLOGY | Facility: CLINIC | Age: 37
End: 2022-07-18

## 2022-10-13 ENCOUNTER — RX RENEWAL (OUTPATIENT)
Age: 37
End: 2022-10-13

## 2022-11-18 ENCOUNTER — RX RENEWAL (OUTPATIENT)
Age: 37
End: 2022-11-18

## 2022-11-20 ENCOUNTER — RX RENEWAL (OUTPATIENT)
Age: 37
End: 2022-11-20

## 2022-11-23 ENCOUNTER — RX RENEWAL (OUTPATIENT)
Age: 37
End: 2022-11-23

## 2022-12-21 ENCOUNTER — RX RENEWAL (OUTPATIENT)
Age: 37
End: 2022-12-21

## 2023-01-24 ENCOUNTER — RX RENEWAL (OUTPATIENT)
Age: 38
End: 2023-01-24

## 2023-01-24 ENCOUNTER — APPOINTMENT (OUTPATIENT)
Dept: ENDOCRINOLOGY | Facility: CLINIC | Age: 38
End: 2023-01-24
Payer: MEDICAID

## 2023-01-24 VITALS
WEIGHT: 315 LBS | HEIGHT: 72 IN | SYSTOLIC BLOOD PRESSURE: 130 MMHG | DIASTOLIC BLOOD PRESSURE: 86 MMHG | OXYGEN SATURATION: 97 % | HEART RATE: 104 BPM | BODY MASS INDEX: 42.66 KG/M2

## 2023-01-24 PROCEDURE — 99215 OFFICE O/P EST HI 40 MIN: CPT

## 2023-01-24 NOTE — HISTORY OF PRESENT ILLNESS
[FreeTextEntry1] : Mr. ALBERTO CHRISTIANSON is 37 year old man here to follow-up for type 2 diabetes as well as recently diagnosed papillary thyroid cancer.\par \par Was discharged from hospital in Oct 2019, with newly diagnosed Type 2 Dm, A1C 11.3% (Sept/2019), EGFR 133. \par He was started on basal bolus regimen with Lantus 60 units and Novolog 22 units ac meals\par Last visit, given overall good A1c control of 5.7% in January 2020, mealtime insulin was discontinued.\par \par Currently taking: \par Patient is currently taking Basaglar 56 units at bedtime\par Unable to tolerate Metformin due to abdominal pain\par Ozempic 1.0 mg once weekly subcutaneously, unable to fill Ozempic therefore currently taking Trulicity 1.5 mg once weekly.\par \par Monitor glucose with chris sensor.  Reports that mostly at goal.\par \par 24 hour recall\par B: Egg, sometimes grilled cheese.  \par L: soup, greek food.  \par D: A burger, he only eats with a bun.  \par He doesn't snack. \par \par He had negative HAM, negative islet cell antibody.  \par \par He doesn't eat the same hours daily.  He works as a .  He eats dinner sometimes late at night.  \par \par Regarding cholesterol, patient denied prior history of elevated cholesterol, currently taking crestor 10mg daily.  \par \par Patient denies any history of hypertension as well.  \par \par Last visit in September 2020, patient was noted to have a thyroid nodule on exam.  Ultrasound was done, notable for 2.5 cm right thyroid nodule.  Fine-needle aspiration was done in November 2020, patient was found to have papillary thyroid cancer.  Patient underwent total thyroidectomy.  Found to have papillary thyroid cancer involving the right lobe, calcific variant with focal infiltrative follicular differentiation, 3.1 cm in greatest dimension, l there is no lymphovascular or perineural invasion.  The resection margins are negative for carcinoma.  3 of 5 lymph nodes positive for metastatic carcinoma, 3/5.  The largest metastatic focus was 0.5 cm in greatest dimension.  No extranodal extension identified.  Focal dystrophic calcifications.  \par \par Patient underwent radioactive iodine treatment with 93.2 mCi of I-131 sodium iodine capsule in Nov 2021. \par \par Post therapy whole-body I-131 scan demonstrated iodine avid tissue in the anterior neck/thyroid bed.  Stimulated thyroglobulin was 5.95 ng/mm.  There is no evidence of distant functioning metastases.\par \par Patient has not follow-up with Dr. Pereyra.\par \par He is currently taking levothyroxine 250 mcg once daily.\par \par Did not complete the ultrasound ordered last year in 2022.\par \par  show

## 2023-01-24 NOTE — ASSESSMENT
[FreeTextEntry1] : Patient is a 34-year-old male with history of diabetic ketoacidosis in the setting of newly diagnosed type 2 diabetes mellitus, prior antibody workup includes negative HAM, negative islet cell antibody; here for endocrinology followup after his discharge from the hospital.\par \par 1. Type 2 DM\par He was using the Craig sensor, glucose mostly in target. \par A1c 6.1% today 2023\par Patient was able to discontinue mealtime insulin. \par Basaglar 56 units at bedtime. \par Patient is currently on Ozempic 0.5 mg once weekly and tolerating well\par Recently changed to Trulicity 1.5mg once weekly \par We will consider increasing the dose to 1 mg once weekly. \par Patient continues to have terrible side effects, abdominal cramp, with metformin, even 500 mg once daily.  Also seeing the whole pill being excreted through the stool.  Therefore recommend to discontinue metformin\par Will discontinue mealtime insulin and transition to GLP-1.\par Continue with Ozempic and increase the dose to 1 mg once weekly.\par Follow-up with ophthalmology needed, will call to schedule \par Follow-up with podiatry both appointment has not been made due to current pandemic\par \par 24 hour food recall:\par Breakfast: sandwich or a egg\par Lunch: sometimes skip. or a sandwich\par Dinner: sometimes sushi, sometimes Chinese food.  \par \par Checking glucose once every 2 days.  \par \par 2. Morbid obesity \par BMI 46.1 kg/m2\par Discussed weight loss surgery with patient.  We'll give referral to bariatric surgery.  Patient is somewhat hesitant because his sister had a terrible experience.  He also had a relative who  from complications of weight loss surgery.\par \par 3. HLD\par  mg/dL started on statin therapy with atorvastatin 10mg once daily \par Stopped with atorvastatin because he was having muscle pain.\par Recommend patient to check fasting lipid profile.\par \par 4. Blood pressure management. \par Blood pressure currently on target, microalbumin check in 2020 8 mg/g.\par Check microalbumin/creatinine ratio\par \par 5. Papillary thyroid cancer\par Patient is status post total thyroidectomy in 2020.  Papillary thyroid cancer involving right lobe, classic variant with focal infiltrative follicular differentiation, 3.1 cm in greatest dimension.  3 out of 5 lymph nodes positive for metastatic carcinoma.  \par Patient underwent central neck dissection for the surgery.\par Status post radioactive iodine treatment in 2021\par Post therapy scan shows iodine avid tissue in the anterior neck/thyroid bed.  Stimulated thyroglobulin was 5.95 ng/mL.\par Currently taking levothyroxine to 50 mcg once daily\par Will check TSH, free T4 and thyroglobulin level today.\par TSH goal of less than 0.10  uIU/mL.\par Will repeat thyroid US.  \par Patient need repeat whole-body scan 1 year after radioactive iodine treatment.\par We will proceed after thyroid ultrasound.  We will check thyroglobulin level, TSH and free T4 level.\par

## 2023-01-24 NOTE — HISTORY OF PRESENT ILLNESS
[FreeTextEntry1] : Mr. ALBERTO CHRISTIANSON is 37 year old man here to follow-up for type 2 diabetes as well as recently diagnosed papillary thyroid cancer.\par \par Was discharged from hospital in Oct 2019, with newly diagnosed Type 2 Dm, A1C 11.3% (Sept/2019), EGFR 133. \par He was started on basal bolus regimen with Lantus 60 units and Novolog 22 units ac meals\par Last visit, given overall good A1c control of 5.7% in January 2020, mealtime insulin was discontinued.\par \par Currently taking: \par Patient is currently taking Basaglar 56 units at bedtime\par Unable to tolerate Metformin due to abdominal pain\par Ozempic 1.0 mg once weekly subcutaneously, unable to fill Ozempic therefore currently taking Trulicity 1.5 mg once weekly.\par \par Monitor glucose with chris sensor.  Reports that mostly at goal.\par \par 24 hour recall\par B: Egg, sometimes grilled cheese.  \par L: soup, greek food.  \par D: A burger, he only eats with a bun.  \par He doesn't snack. \par \par He had negative HAM, negative islet cell antibody.  \par \par He doesn't eat the same hours daily.  He works as a .  He eats dinner sometimes late at night.  \par \par Regarding cholesterol, patient denied prior history of elevated cholesterol, currently taking crestor 10mg daily.  \par \par Patient denies any history of hypertension as well.  \par \par Last visit in September 2020, patient was noted to have a thyroid nodule on exam.  Ultrasound was done, notable for 2.5 cm right thyroid nodule.  Fine-needle aspiration was done in November 2020, patient was found to have papillary thyroid cancer.  Patient underwent total thyroidectomy.  Found to have papillary thyroid cancer involving the right lobe, calcific variant with focal infiltrative follicular differentiation, 3.1 cm in greatest dimension, l there is no lymphovascular or perineural invasion.  The resection margins are negative for carcinoma.  3 of 5 lymph nodes positive for metastatic carcinoma, 3/5.  The largest metastatic focus was 0.5 cm in greatest dimension.  No extranodal extension identified.  Focal dystrophic calcifications.  \par \par Patient underwent radioactive iodine treatment with 93.2 mCi of I-131 sodium iodine capsule in Nov 2021. \par \par Post therapy whole-body I-131 scan demonstrated iodine avid tissue in the anterior neck/thyroid bed.  Stimulated thyroglobulin was 5.95 ng/mm.  There is no evidence of distant functioning metastases.\par \par Patient has not follow-up with Dr. Pereyra.\par \par He is currently taking levothyroxine 250 mcg once daily.\par \par Did not complete the ultrasound ordered last year in 2022.\par \par

## 2023-02-22 ENCOUNTER — APPOINTMENT (OUTPATIENT)
Dept: INTERNAL MEDICINE | Facility: CLINIC | Age: 38
End: 2023-02-22

## 2023-04-24 ENCOUNTER — RX RENEWAL (OUTPATIENT)
Age: 38
End: 2023-04-24

## 2023-05-15 NOTE — ASU DISCHARGE PLAN (ADULT/PEDIATRIC) - PROVIDER TOKENS
PROVIDER:[TOKEN:[[id number]],SCHEDULEDAPPT:[12/08/2020],SCHEDULEDAPPTTIME:[11:00 AM]] Time spent for extensive review of the physical chart, electronic medical record, and documentation to obtain collateral information including but not limited to:  [ x] Inpatient records (ED, H&P, primary team, and consultants if applicable, care coordination)  [ x] Inpatient values/results (biomarkers, immunoassays, imaging, and microbiology results)  [ x] Current or proposed treatment plans  [x ] Discussion with the primary team  [ x] Discussion with the patient, surrogate decision maker, or family    Time spent: >75

## 2023-05-25 ENCOUNTER — RX RENEWAL (OUTPATIENT)
Age: 38
End: 2023-05-25

## 2023-06-20 ENCOUNTER — NON-APPOINTMENT (OUTPATIENT)
Age: 38
End: 2023-06-20

## 2023-06-20 LAB — HBA1C MFR BLD HPLC: 8.8

## 2023-06-20 RX ORDER — DULAGLUTIDE 1.5 MG/.5ML
1.5 INJECTION, SOLUTION SUBCUTANEOUS
Qty: 1 | Refills: 5 | Status: DISCONTINUED | COMMUNITY
Start: 2022-11-17 | End: 2023-06-20

## 2023-06-21 LAB — TSH SERPL-ACNC: 7.23

## 2023-08-25 ENCOUNTER — RX RENEWAL (OUTPATIENT)
Age: 38
End: 2023-08-25

## 2023-08-26 ENCOUNTER — INPATIENT (INPATIENT)
Facility: HOSPITAL | Age: 38
LOS: 2 days | Discharge: HOME | DRG: 872 | End: 2023-08-29
Attending: INTERNAL MEDICINE | Admitting: HOSPITALIST
Payer: MEDICAID

## 2023-08-26 VITALS
DIASTOLIC BLOOD PRESSURE: 69 MMHG | RESPIRATION RATE: 23 BRPM | WEIGHT: 300.05 LBS | OXYGEN SATURATION: 96 % | HEART RATE: 138 BPM | TEMPERATURE: 99 F | SYSTOLIC BLOOD PRESSURE: 126 MMHG | HEIGHT: 74 IN

## 2023-08-26 DIAGNOSIS — L03.90 CELLULITIS, UNSPECIFIED: ICD-10-CM

## 2023-08-26 DIAGNOSIS — Z98.890 OTHER SPECIFIED POSTPROCEDURAL STATES: Chronic | ICD-10-CM

## 2023-08-26 LAB
APPEARANCE UR: CLEAR — SIGNIFICANT CHANGE UP
APTT BLD: 27.3 SEC — SIGNIFICANT CHANGE UP (ref 24.5–35.6)
BASE EXCESS BLDV CALC-SCNC: 2.7 MMOL/L — SIGNIFICANT CHANGE UP (ref -2–3)
BASOPHILS # BLD AUTO: 0.04 K/UL — SIGNIFICANT CHANGE UP (ref 0–0.2)
BASOPHILS NFR BLD AUTO: 0.5 % — SIGNIFICANT CHANGE UP (ref 0–2)
BILIRUB UR-MCNC: NEGATIVE — SIGNIFICANT CHANGE UP
CA-I SERPL-SCNC: 1.17 MMOL/L — SIGNIFICANT CHANGE UP (ref 1.15–1.33)
CHLORIDE BLDV-SCNC: 95 MMOL/L — LOW (ref 96–108)
CO2 BLDV-SCNC: 29 MMOL/L — HIGH (ref 22–26)
COLOR SPEC: SIGNIFICANT CHANGE UP
CRP SERPL-MCNC: 90 MG/L — HIGH (ref 0–4)
DIFF PNL FLD: NEGATIVE — SIGNIFICANT CHANGE UP
EOSINOPHIL # BLD AUTO: 0.12 K/UL — SIGNIFICANT CHANGE UP (ref 0–0.5)
EOSINOPHIL NFR BLD AUTO: 1.4 % — SIGNIFICANT CHANGE UP (ref 0–6)
GAS PNL BLDV: 132 MMOL/L — LOW (ref 136–145)
GAS PNL BLDV: SIGNIFICANT CHANGE UP
GLUCOSE BLDV-MCNC: 512 MG/DL — CRITICAL HIGH (ref 70–99)
GLUCOSE UR QL: ABNORMAL
HCO3 BLDV-SCNC: 28 MMOL/L — SIGNIFICANT CHANGE UP (ref 22–29)
HCT VFR BLD CALC: 44.6 % — SIGNIFICANT CHANGE UP (ref 39–50)
HCT VFR BLDA CALC: 47 % — SIGNIFICANT CHANGE UP (ref 39–51)
HGB BLD CALC-MCNC: 15.7 G/DL — SIGNIFICANT CHANGE UP (ref 12.6–17.4)
HGB BLD-MCNC: 15.6 G/DL — SIGNIFICANT CHANGE UP (ref 13–17)
IMM GRANULOCYTES NFR BLD AUTO: 0.4 % — SIGNIFICANT CHANGE UP (ref 0–0.9)
INR BLD: 1 RATIO — SIGNIFICANT CHANGE UP (ref 0.85–1.18)
KETONES UR-MCNC: ABNORMAL
LACTATE BLDV-MCNC: 1.9 MMOL/L — SIGNIFICANT CHANGE UP (ref 0.5–2)
LEUKOCYTE ESTERASE UR-ACNC: NEGATIVE — SIGNIFICANT CHANGE UP
LYMPHOCYTES # BLD AUTO: 1.36 K/UL — SIGNIFICANT CHANGE UP (ref 1–3.3)
LYMPHOCYTES # BLD AUTO: 16.4 % — SIGNIFICANT CHANGE UP (ref 13–44)
MAGNESIUM SERPL-MCNC: 1.9 MG/DL — SIGNIFICANT CHANGE UP (ref 1.6–2.6)
MCHC RBC-ENTMCNC: 30.1 PG — SIGNIFICANT CHANGE UP (ref 27–34)
MCHC RBC-ENTMCNC: 35 GM/DL — SIGNIFICANT CHANGE UP (ref 32–36)
MCV RBC AUTO: 85.9 FL — SIGNIFICANT CHANGE UP (ref 80–100)
MONOCYTES # BLD AUTO: 0.61 K/UL — SIGNIFICANT CHANGE UP (ref 0–0.9)
MONOCYTES NFR BLD AUTO: 7.4 % — SIGNIFICANT CHANGE UP (ref 2–14)
NEUTROPHILS # BLD AUTO: 6.13 K/UL — SIGNIFICANT CHANGE UP (ref 1.8–7.4)
NEUTROPHILS NFR BLD AUTO: 73.9 % — SIGNIFICANT CHANGE UP (ref 43–77)
NITRITE UR-MCNC: NEGATIVE — SIGNIFICANT CHANGE UP
NRBC # BLD: 0 /100 WBCS — SIGNIFICANT CHANGE UP (ref 0–0)
PCO2 BLDV: 44 MMHG — SIGNIFICANT CHANGE UP (ref 42–55)
PH BLDV: 7.41 — SIGNIFICANT CHANGE UP (ref 7.32–7.43)
PH UR: 5 — SIGNIFICANT CHANGE UP (ref 5–8)
PHOSPHATE SERPL-MCNC: 3.7 MG/DL — SIGNIFICANT CHANGE UP (ref 2.5–4.5)
PLATELET # BLD AUTO: 171 K/UL — SIGNIFICANT CHANGE UP (ref 150–400)
PO2 BLDV: 35 MMHG — SIGNIFICANT CHANGE UP (ref 25–45)
POTASSIUM BLDV-SCNC: 4 MMOL/L — SIGNIFICANT CHANGE UP (ref 3.5–5.1)
PROT UR-MCNC: NEGATIVE — SIGNIFICANT CHANGE UP
PROTHROM AB SERPL-ACNC: 10.5 SEC — SIGNIFICANT CHANGE UP (ref 9.5–13)
RBC # BLD: 5.19 M/UL — SIGNIFICANT CHANGE UP (ref 4.2–5.8)
RBC # FLD: 12.1 % — SIGNIFICANT CHANGE UP (ref 10.3–14.5)
SAO2 % BLDV: 56.6 % — LOW (ref 67–88)
SP GR SPEC: 1.03 — HIGH (ref 1.01–1.02)
T3 SERPL-MCNC: 83 NG/DL — SIGNIFICANT CHANGE UP (ref 80–200)
T4 AB SER-ACNC: 11.2 UG/DL — SIGNIFICANT CHANGE UP (ref 4.6–12)
TSH SERPL-MCNC: 0.45 UIU/ML — SIGNIFICANT CHANGE UP (ref 0.27–4.2)
UROBILINOGEN FLD QL: NEGATIVE — SIGNIFICANT CHANGE UP
WBC # BLD: 8.29 K/UL — SIGNIFICANT CHANGE UP (ref 3.8–10.5)
WBC # FLD AUTO: 8.29 K/UL — SIGNIFICANT CHANGE UP (ref 3.8–10.5)

## 2023-08-26 PROCEDURE — 99223 1ST HOSP IP/OBS HIGH 75: CPT | Mod: GC

## 2023-08-26 PROCEDURE — 73701 CT LOWER EXTREMITY W/DYE: CPT | Mod: 26,LT,MA

## 2023-08-26 PROCEDURE — 74177 CT ABD & PELVIS W/CONTRAST: CPT | Mod: 26,MA

## 2023-08-26 PROCEDURE — 99285 EMERGENCY DEPT VISIT HI MDM: CPT

## 2023-08-26 PROCEDURE — 10060 I&D ABSCESS SIMPLE/SINGLE: CPT

## 2023-08-26 RX ORDER — DEXTROSE 50 % IN WATER 50 %
25 SYRINGE (ML) INTRAVENOUS ONCE
Refills: 0 | Status: DISCONTINUED | OUTPATIENT
Start: 2023-08-26 | End: 2023-08-29

## 2023-08-26 RX ORDER — SODIUM CHLORIDE 9 MG/ML
2000 INJECTION INTRAMUSCULAR; INTRAVENOUS; SUBCUTANEOUS ONCE
Refills: 0 | Status: COMPLETED | OUTPATIENT
Start: 2023-08-26 | End: 2023-08-26

## 2023-08-26 RX ORDER — DIPHENHYDRAMINE HCL 50 MG
25 CAPSULE ORAL ONCE
Refills: 0 | Status: COMPLETED | OUTPATIENT
Start: 2023-08-26 | End: 2023-08-26

## 2023-08-26 RX ORDER — PIPERACILLIN AND TAZOBACTAM 4; .5 G/20ML; G/20ML
3.38 INJECTION, POWDER, LYOPHILIZED, FOR SOLUTION INTRAVENOUS ONCE
Refills: 0 | Status: COMPLETED | OUTPATIENT
Start: 2023-08-26 | End: 2023-08-26

## 2023-08-26 RX ORDER — HYDROMORPHONE HYDROCHLORIDE 2 MG/ML
0.5 INJECTION INTRAMUSCULAR; INTRAVENOUS; SUBCUTANEOUS ONCE
Refills: 0 | Status: DISCONTINUED | OUTPATIENT
Start: 2023-08-26 | End: 2023-08-26

## 2023-08-26 RX ORDER — INSULIN LISPRO 100/ML
VIAL (ML) SUBCUTANEOUS
Refills: 0 | Status: DISCONTINUED | OUTPATIENT
Start: 2023-08-26 | End: 2023-08-29

## 2023-08-26 RX ORDER — SODIUM CHLORIDE 9 MG/ML
1000 INJECTION, SOLUTION INTRAVENOUS
Refills: 0 | Status: DISCONTINUED | OUTPATIENT
Start: 2023-08-26 | End: 2023-08-29

## 2023-08-26 RX ORDER — INSULIN HUMAN 100 [IU]/ML
10 INJECTION, SOLUTION SUBCUTANEOUS ONCE
Refills: 0 | Status: COMPLETED | OUTPATIENT
Start: 2023-08-26 | End: 2023-08-26

## 2023-08-26 RX ORDER — ACETAMINOPHEN 500 MG
650 TABLET ORAL EVERY 6 HOURS
Refills: 0 | Status: DISCONTINUED | OUTPATIENT
Start: 2023-08-26 | End: 2023-08-29

## 2023-08-26 RX ORDER — GLUCAGON INJECTION, SOLUTION 0.5 MG/.1ML
1 INJECTION, SOLUTION SUBCUTANEOUS ONCE
Refills: 0 | Status: DISCONTINUED | OUTPATIENT
Start: 2023-08-26 | End: 2023-08-29

## 2023-08-26 RX ORDER — VANCOMYCIN HCL 1 G
1000 VIAL (EA) INTRAVENOUS ONCE
Refills: 0 | Status: COMPLETED | OUTPATIENT
Start: 2023-08-26 | End: 2023-08-26

## 2023-08-26 RX ORDER — DEXTROSE 50 % IN WATER 50 %
15 SYRINGE (ML) INTRAVENOUS ONCE
Refills: 0 | Status: DISCONTINUED | OUTPATIENT
Start: 2023-08-26 | End: 2023-08-29

## 2023-08-26 RX ORDER — DIPHENHYDRAMINE HCL 50 MG
50 CAPSULE ORAL ONCE
Refills: 0 | Status: DISCONTINUED | OUTPATIENT
Start: 2023-08-26 | End: 2023-08-26

## 2023-08-26 RX ORDER — PIPERACILLIN AND TAZOBACTAM 4; .5 G/20ML; G/20ML
3.38 INJECTION, POWDER, LYOPHILIZED, FOR SOLUTION INTRAVENOUS EVERY 8 HOURS
Refills: 0 | Status: DISCONTINUED | OUTPATIENT
Start: 2023-08-26 | End: 2023-08-27

## 2023-08-26 RX ORDER — INSULIN LISPRO 100/ML
VIAL (ML) SUBCUTANEOUS AT BEDTIME
Refills: 0 | Status: DISCONTINUED | OUTPATIENT
Start: 2023-08-26 | End: 2023-08-29

## 2023-08-26 RX ORDER — LANOLIN ALCOHOL/MO/W.PET/CERES
3 CREAM (GRAM) TOPICAL AT BEDTIME
Refills: 0 | Status: DISCONTINUED | OUTPATIENT
Start: 2023-08-26 | End: 2023-08-29

## 2023-08-26 RX ORDER — DEXTROSE 50 % IN WATER 50 %
12.5 SYRINGE (ML) INTRAVENOUS ONCE
Refills: 0 | Status: DISCONTINUED | OUTPATIENT
Start: 2023-08-26 | End: 2023-08-29

## 2023-08-26 RX ORDER — SODIUM CHLORIDE 9 MG/ML
1000 INJECTION INTRAMUSCULAR; INTRAVENOUS; SUBCUTANEOUS ONCE
Refills: 0 | Status: COMPLETED | OUTPATIENT
Start: 2023-08-26 | End: 2023-08-26

## 2023-08-26 RX ADMIN — SODIUM CHLORIDE 2000 MILLILITER(S): 9 INJECTION INTRAMUSCULAR; INTRAVENOUS; SUBCUTANEOUS at 15:39

## 2023-08-26 RX ADMIN — HYDROMORPHONE HYDROCHLORIDE 0.5 MILLIGRAM(S): 2 INJECTION INTRAMUSCULAR; INTRAVENOUS; SUBCUTANEOUS at 19:44

## 2023-08-26 RX ADMIN — PIPERACILLIN AND TAZOBACTAM 200 GRAM(S): 4; .5 INJECTION, POWDER, LYOPHILIZED, FOR SOLUTION INTRAVENOUS at 15:39

## 2023-08-26 RX ADMIN — Medication 100 MILLIGRAM(S): at 16:42

## 2023-08-26 RX ADMIN — SODIUM CHLORIDE 1000 MILLILITER(S): 9 INJECTION INTRAMUSCULAR; INTRAVENOUS; SUBCUTANEOUS at 18:41

## 2023-08-26 RX ADMIN — PIPERACILLIN AND TAZOBACTAM 25 GRAM(S): 4; .5 INJECTION, POWDER, LYOPHILIZED, FOR SOLUTION INTRAVENOUS at 20:19

## 2023-08-26 RX ADMIN — INSULIN HUMAN 10 UNIT(S): 100 INJECTION, SOLUTION SUBCUTANEOUS at 17:36

## 2023-08-26 RX ADMIN — Medication 25 MILLIGRAM(S): at 16:45

## 2023-08-26 RX ADMIN — Medication 250 MILLIGRAM(S): at 16:15

## 2023-08-26 NOTE — CONSULT NOTE ADULT - SUBJECTIVE AND OBJECTIVE BOX
SURGERY CONSULT NOTE  --------------------------------------------------------------------------------------------    Patient is a 37y old  Male who presents with a chief complaint of left thigh redness for several days.  Had an ingrown hair/pimple, he scratched the area, and over the past 2 days redness got worse.  Has noticed only scant drainage. no fevers or chills, otherwise well. Has never had these symptoms.     ROS: 10-system review is otherwise negative except HPI above.      PAST MEDICAL & SURGICAL HISTORY:  History of heartburn      Morbid obesity with body mass index (BMI) of 40.0 to 49.9      Type 2 diabetes mellitus      Thyroid malignant neoplasm      History of tonsillectomy and adenoidectomy  as a child        FAMILY HISTORY:  Family history of colon cancer in mother  throat        SOCIAL HISTORY:      ALLERGIES: No Known Allergies      HOME MEDICATIONS:     CURRENT MEDICATIONS  MEDICATIONS (STANDING):   MEDICATIONS (PRN):  --------------------------------------------------------------------------------------------    Vitals:   T(C): 36.6 (23 @ 19:44), Max: 38.1 (23 @ 15:28)  HR: 93 (23 @ 19:44) (91 - 138)  BP: 107/74 (23 @ 19:44) (106/76 - 126/69)  RR: 20 (23 @ 19:44) (20 - 23)  SpO2: 98% (23 @ 19:44) (96% - 98%)  CAPILLARY BLOOD GLUCOSE      POCT Blood Glucose.: 401 mg/dL (26 Aug 2023 17:34)    CAPILLARY BLOOD GLUCOSE      POCT Blood Glucose.: 401 mg/dL (26 Aug 2023 17:34)      Height (cm): 188 ( @ 14:53)  Weight (kg): 136.1 ( @ 14:53)  BMI (kg/m2): 38.5 ( @ 14:53)  BSA (m2): 2.58 ( @ 14:53)    PHYSICAL EXAM:   General: NAD, Lying in bed comfortably  Neuro: A+Ox3  HEENT: NC/AT, EOMI  Neck: Soft, supple  Cardio: RRR  Resp: Good effort  GI/Abd: Soft, NT/ND, no rebound/guarding, no masses palpated  Thigh: left thigh no crepitus, fluctuance x2 medial thigh, cellulitis not expanding past radha over several hours   Musculoskeletal: All 4 extremities moving spontaneously, no limitations.  --------------------------------------------------------------------------------------------    LABS  CBC ( @ 16:08)                              15.6                           8.29    )----------------(  171        73.9  % Neutrophils, 16.4  % Lymphocytes, ANC: 6.13                                44.6      BMP ( @ 16:08)             134<L>  |  95<L>   |  14    		Ca++ --      Ca 9.5                ---------------------------------( 494<HH>		Mg 1.9                4.1     |  24      |  0.77  			Ph 3.7       LFTs ( @ 16:08)      TPro 7.5 / Alb 4.3 / TBili 1.0 / DBili -- / AST 22 / ALT 45 / AlkPhos 63    Coags ( @ 16:08)  aPTT 27.3 / INR 1.00 / PT 10.5        VBG ( @ 15:50)     7.41 / 44 / 35 / 28 / 2.7 / 56.6<L>%     Lactate: 1.9    --------------------------------------------------------------------------------------------    MICROBIOLOGY  Urinalysis ( @ 16:25):     Color: Light Yellow / Appearance: Clear / S.034<H> / pH: 5.0 / Gluc: 1000 mg/dL<!> / Ketones: Small<!> / Bili: Negative / Urobili: Negative / Protein :Negative / Nitrites: Negative / Leuk.Est: Negative / RBC:  / WBC:  / Sq Epi:  / Non Sq Epi:  / Bacteria          --------------------------------------------------------------------------------------------    IMAGING    < from: CT Lower Extremity w/ IV Cont, Left (23 @ 18:18) >  BOWEL: Scattered colonic diverticula without acute diverticulitis. No   bowel obstruction. Appendix is normal.  PERITONEUM: No ascites.  VESSELS: Within normal limits.  RETROPERITONEUM/LYMPH NODES: No lymphadenopathy.  ABDOMINAL WALL: A tiny fat-containing umbilical hernia.  LEFT LOWER EXTREMITY: Mild skin thickening/inflammatory change involving   the medial left thigh, suggesting cellulitis. There are a couple areas of  focal skin thickening including a focal area of skin thickening in the   anteromedial left thigh superiorly with underlying inflammatory change,   but no drainable collection. Slightly posterior inferiorly, in the medial   thigh, there is a second area of focal skin thickening inclusive of a   small intradermal collection measuring 3.2 x 1.5 x 1.6 cm (3:77) with   subjacent subcutaneous inflammatory change, suggesting small   abscess/superinfected dermal cyst. Underlying osseous structures are   unremarkable. No extension to the joint space.  BONES: Degenerative changes.    IMPRESSION:  Medial left thigh cellulitis with a couple areas of more focal skin   thickening including at the anteromedial upper thigh without abscess and   more inferiorly in the medial thigh inclusive of a 3.2 cm intradermal   collection, compatible with abscess/superinfected cyst.    No CT evidence of acute intra-abdominal pathology.

## 2023-08-26 NOTE — ED ADULT NURSE NOTE - OBJECTIVE STATEMENT
37yM, A&Ox4, independent, PMH thyroid ca s/p resection, DM, HTN, HLD presents to the ED left upper thigh pain, redness and swelling x3-4 days.  Patient states a few days ago noticed a pimple on his left groin that gradually worsened in size. States area became red and hard. Pt states put alcohol on it last night and the redness and swelling improved slightly. Serosanguinous fluid coming from the wound. Endorsing pain with palpation and while walking. Pt states he has had multiple abcesses on his back in the past. Gross neuro intact, no difficulty speaking in complete sentences, pulses x 4, moving all extremities, abdomen soft nontender nondistended. Pt denies fevers/chills, numbness/tingling, weakness, headache, dizziness, vision changes, cp, sob, cough, abd pain, n/v/d, dysuria, hematuria, bloody stools, back pain.

## 2023-08-26 NOTE — ED PROVIDER NOTE - PHYSICAL EXAMINATION
GENERAL: Awake, alert, NAD  HEENT: NC/AT, moist mucous membranes, PERRL, EOMI  LUNGS: CTAB, no wheezes or crackles   CARDIAC: tachycardic, no m/r/g  ABDOMEN: Soft, non tender, non distended, no rebound, no guarding  BACK: No midline spinal tenderness, no CVA tenderness  EXT: No edema, no calf tenderness, 2+ DP pulses bilaterally, no deformities.  NEURO: A&Ox3. Moving all extremities.  SKIN: Warm and dry. No rash. Large area of erythema and fluctuance over the left groin, TTP of the region, Two pimples on the groin not draining.   PSYCH: Normal affect.

## 2023-08-26 NOTE — CONSULT NOTE ADULT - ASSESSMENT
37 year old man with DM on large amount of basaglar, with cellulitis and abscesses of LLE at thigh, no evidence of NSTI.     Recs:  - I&D x2 with purulence, packed   - Will follow   - Medical admission for IV abx for cellulitis   - Glycemic control per medicine  - Call with questions  - Seen with Dr. Iván Gusman MD, PGY3  ACS  [id number]

## 2023-08-26 NOTE — ED ADULT TRIAGE NOTE - CHIEF COMPLAINT QUOTE
abscess next to left testicle with swelling and drainage x a few days, palpitations x3days  Denies fever, abdominal pain

## 2023-08-26 NOTE — ED ADULT NURSE REASSESSMENT NOTE - NS ED NURSE REASSESS COMMENT FT1
pt experienced redness and itching to face and neck after receiving vancomycin IVPB. MD bates notified. Pt given benadryl per order. Pt in no acute distress at this time. VSS

## 2023-08-26 NOTE — ED PROVIDER NOTE - OBJECTIVE STATEMENT
37-year-old male medical history significant for thyroid cancer status post resection, DM, HTN, HLD presenting for groin pain.  Patient states a few days ago noticed a pimple on his left groin that gradually worsened in size.  States that the area became red and he put alcohol on it last night and the redness and swelling mildly decreased.  Patient reports that in the past he had numerous abscesses on his back.  The patient endorses pain with palpation and with ambulation.  The patient has not taken anything for his symptoms.  The patient denies fevers, chills, chest pain, shortness of breath, headache, visual changes, nausea or vomiting.

## 2023-08-26 NOTE — PROCEDURE NOTE - ADDITIONAL PROCEDURE DETAILS
Left thigh abscess seen on CT/POCUS, drained with #11 blade, 10cc purulent material from 2 incisions, packed, hemostatic.

## 2023-08-26 NOTE — PATIENT PROFILE ADULT - FALL HARM RISK - HARM RISK INTERVENTIONS

## 2023-08-26 NOTE — ED PROVIDER NOTE - CLINICAL SUMMARY MEDICAL DECISION MAKING FREE TEXT BOX
37-year-old male medical history significant for thyroid cancer status post resection, diabetes, HTN, HLD presenting for groin pain.  The patient denies fevers, chills, chest pain, shortness of breath, headache, visual changes, nausea or vomiting. VS. febrile, tachycardic, meeting SEPSIS criteria. PE. Large area of erythema and fluctuance over the left groin, TTP of the region, Two pimples on the groin not draining.     Differential not limited to abscess, cellulitis, necrotizing fasciitis, lower concern for sarai's gangrene, will also evaluate for electrolyte/hematological/thyroid hormone abnormalities.  Will obtain basic labs, blood cultures, CT abdomen and pelvis and CT of the left femur, give patient pain control and fluids and antipyretics.  Dispo pending labs imaging and reassessment.  Patient likely to be admitted for IV antibiotics and further management. 37-year-old male  morbidly obese ,works as   with  medical history significant for thyroid cancer status post resection,  no RT or chemo ,diabetes, HTN, HLD presenting for groin pain. and rapidly progressive cellulitis in l groin area     The patient denies fevers, chills, chest pain, shortness of breath, headache, visual changes, nausea or vomiting. VS. febrile, tachycardic, meeting SEPSIS criteria. PE. Large area of erythema and fluctuance over the left groin,  with 2 pustules in inner thigh ,TTP of the region,  r/o necrotising fasciatis for sarai's gangrene, will also evaluate for electrolyte/hematological/thyroid hormone abnormalities.  Will obtain basic labs, blood cultures, CT abdomen and pelvis and CT of the left femur, give patient pain control and fluids and antipyretics.   surgical cons ,admission for i.v antibiotics ZR

## 2023-08-27 DIAGNOSIS — L03.90 CELLULITIS, UNSPECIFIED: ICD-10-CM

## 2023-08-27 DIAGNOSIS — E11.9 TYPE 2 DIABETES MELLITUS WITHOUT COMPLICATIONS: ICD-10-CM

## 2023-08-27 DIAGNOSIS — A41.9 SEPSIS, UNSPECIFIED ORGANISM: ICD-10-CM

## 2023-08-27 DIAGNOSIS — C73 MALIGNANT NEOPLASM OF THYROID GLAND: ICD-10-CM

## 2023-08-27 DIAGNOSIS — Z29.9 ENCOUNTER FOR PROPHYLACTIC MEASURES, UNSPECIFIED: ICD-10-CM

## 2023-08-27 DIAGNOSIS — L02.91 CUTANEOUS ABSCESS, UNSPECIFIED: ICD-10-CM

## 2023-08-27 DIAGNOSIS — I10 ESSENTIAL (PRIMARY) HYPERTENSION: ICD-10-CM

## 2023-08-27 LAB
A1C WITH ESTIMATED AVERAGE GLUCOSE RESULT: 10.6 % — HIGH (ref 4–5.6)
ALBUMIN SERPL ELPH-MCNC: 3.4 G/DL — SIGNIFICANT CHANGE UP (ref 3.3–5)
ALP SERPL-CCNC: 48 U/L — SIGNIFICANT CHANGE UP (ref 40–120)
ALT FLD-CCNC: 38 U/L — SIGNIFICANT CHANGE UP (ref 10–45)
ANION GAP SERPL CALC-SCNC: 12 MMOL/L — SIGNIFICANT CHANGE UP (ref 5–17)
AST SERPL-CCNC: 23 U/L — SIGNIFICANT CHANGE UP (ref 10–40)
BILIRUB SERPL-MCNC: 0.4 MG/DL — SIGNIFICANT CHANGE UP (ref 0.2–1.2)
BUN SERPL-MCNC: 15 MG/DL — SIGNIFICANT CHANGE UP (ref 7–23)
CALCIUM SERPL-MCNC: 8 MG/DL — LOW (ref 8.4–10.5)
CHLORIDE SERPL-SCNC: 104 MMOL/L — SIGNIFICANT CHANGE UP (ref 96–108)
CK SERPL-CCNC: 64 U/L — SIGNIFICANT CHANGE UP (ref 30–200)
CO2 SERPL-SCNC: 22 MMOL/L — SIGNIFICANT CHANGE UP (ref 22–31)
CREAT SERPL-MCNC: 0.76 MG/DL — SIGNIFICANT CHANGE UP (ref 0.5–1.3)
CULTURE RESULTS: NO GROWTH — SIGNIFICANT CHANGE UP
EGFR: 117 ML/MIN/1.73M2 — SIGNIFICANT CHANGE UP
ESTIMATED AVERAGE GLUCOSE: 258 MG/DL — HIGH (ref 68–114)
GLUCOSE BLDC GLUCOMTR-MCNC: 257 MG/DL — HIGH (ref 70–99)
GLUCOSE BLDC GLUCOMTR-MCNC: 299 MG/DL — HIGH (ref 70–99)
GLUCOSE BLDC GLUCOMTR-MCNC: 306 MG/DL — HIGH (ref 70–99)
GLUCOSE BLDC GLUCOMTR-MCNC: 317 MG/DL — HIGH (ref 70–99)
GLUCOSE BLDC GLUCOMTR-MCNC: 341 MG/DL — HIGH (ref 70–99)
GLUCOSE SERPL-MCNC: 269 MG/DL — HIGH (ref 70–99)
GRAM STN FLD: SIGNIFICANT CHANGE UP
HCT VFR BLD CALC: 38 % — LOW (ref 39–50)
HGB BLD-MCNC: 12.8 G/DL — LOW (ref 13–17)
MAGNESIUM SERPL-MCNC: 1.7 MG/DL — SIGNIFICANT CHANGE UP (ref 1.6–2.6)
MCHC RBC-ENTMCNC: 30 PG — SIGNIFICANT CHANGE UP (ref 27–34)
MCHC RBC-ENTMCNC: 33.7 GM/DL — SIGNIFICANT CHANGE UP (ref 32–36)
MCV RBC AUTO: 89.2 FL — SIGNIFICANT CHANGE UP (ref 80–100)
NRBC # BLD: 0 /100 WBCS — SIGNIFICANT CHANGE UP (ref 0–0)
PHOSPHATE SERPL-MCNC: 3.5 MG/DL — SIGNIFICANT CHANGE UP (ref 2.5–4.5)
PLATELET # BLD AUTO: 157 K/UL — SIGNIFICANT CHANGE UP (ref 150–400)
POTASSIUM SERPL-MCNC: 3.6 MMOL/L — SIGNIFICANT CHANGE UP (ref 3.5–5.3)
POTASSIUM SERPL-SCNC: 3.6 MMOL/L — SIGNIFICANT CHANGE UP (ref 3.5–5.3)
PROT SERPL-MCNC: 5.8 G/DL — LOW (ref 6–8.3)
RBC # BLD: 4.26 M/UL — SIGNIFICANT CHANGE UP (ref 4.2–5.8)
RBC # FLD: 12.4 % — SIGNIFICANT CHANGE UP (ref 10.3–14.5)
SODIUM SERPL-SCNC: 138 MMOL/L — SIGNIFICANT CHANGE UP (ref 135–145)
SPECIMEN SOURCE: SIGNIFICANT CHANGE UP
SPECIMEN SOURCE: SIGNIFICANT CHANGE UP
WBC # BLD: 6.88 K/UL — SIGNIFICANT CHANGE UP (ref 3.8–10.5)
WBC # FLD AUTO: 6.88 K/UL — SIGNIFICANT CHANGE UP (ref 3.8–10.5)

## 2023-08-27 PROCEDURE — 99253 IP/OBS CNSLTJ NEW/EST LOW 45: CPT | Mod: GC

## 2023-08-27 RX ORDER — INSULIN LISPRO 100/ML
4 VIAL (ML) SUBCUTANEOUS ONCE
Refills: 0 | Status: COMPLETED | OUTPATIENT
Start: 2023-08-27 | End: 2023-08-27

## 2023-08-27 RX ORDER — SODIUM CHLORIDE 9 MG/ML
1000 INJECTION, SOLUTION INTRAVENOUS ONCE
Refills: 0 | Status: COMPLETED | OUTPATIENT
Start: 2023-08-27 | End: 2023-08-27

## 2023-08-27 RX ORDER — CHLORHEXIDINE GLUCONATE 213 G/1000ML
1 SOLUTION TOPICAL DAILY
Refills: 0 | Status: DISCONTINUED | OUTPATIENT
Start: 2023-08-27 | End: 2023-08-29

## 2023-08-27 RX ORDER — DAPTOMYCIN 500 MG/10ML
640 INJECTION, POWDER, LYOPHILIZED, FOR SOLUTION INTRAVENOUS EVERY 24 HOURS
Refills: 0 | Status: DISCONTINUED | OUTPATIENT
Start: 2023-08-27 | End: 2023-08-27

## 2023-08-27 RX ORDER — SEMAGLUTIDE 0.68 MG/ML
1 INJECTION, SOLUTION SUBCUTANEOUS
Refills: 0 | DISCHARGE

## 2023-08-27 RX ORDER — PIPERACILLIN AND TAZOBACTAM 4; .5 G/20ML; G/20ML
3.38 INJECTION, POWDER, LYOPHILIZED, FOR SOLUTION INTRAVENOUS ONCE
Refills: 0 | Status: COMPLETED | OUTPATIENT
Start: 2023-08-27 | End: 2023-08-27

## 2023-08-27 RX ORDER — INSULIN GLARGINE 100 [IU]/ML
51 INJECTION, SOLUTION SUBCUTANEOUS AT BEDTIME
Refills: 0 | Status: DISCONTINUED | OUTPATIENT
Start: 2023-08-27 | End: 2023-08-27

## 2023-08-27 RX ORDER — VANCOMYCIN HCL 1 G
1500 VIAL (EA) INTRAVENOUS EVERY 12 HOURS
Refills: 0 | Status: DISCONTINUED | OUTPATIENT
Start: 2023-08-27 | End: 2023-08-29

## 2023-08-27 RX ORDER — INSULIN LISPRO 100/ML
3 VIAL (ML) SUBCUTANEOUS
Refills: 0 | Status: DISCONTINUED | OUTPATIENT
Start: 2023-08-27 | End: 2023-08-28

## 2023-08-27 RX ORDER — ATORVASTATIN CALCIUM 80 MG/1
20 TABLET, FILM COATED ORAL AT BEDTIME
Refills: 0 | Status: DISCONTINUED | OUTPATIENT
Start: 2023-08-27 | End: 2023-08-29

## 2023-08-27 RX ORDER — LEVOTHYROXINE SODIUM 125 MCG
275 TABLET ORAL DAILY
Refills: 0 | Status: DISCONTINUED | OUTPATIENT
Start: 2023-08-27 | End: 2023-08-29

## 2023-08-27 RX ORDER — INSULIN GLARGINE 100 [IU]/ML
60 INJECTION, SOLUTION SUBCUTANEOUS AT BEDTIME
Refills: 0 | Status: DISCONTINUED | OUTPATIENT
Start: 2023-08-27 | End: 2023-08-28

## 2023-08-27 RX ORDER — SEMAGLUTIDE 0.68 MG/ML
0 INJECTION, SOLUTION SUBCUTANEOUS
Qty: 0 | Refills: 0 | DISCHARGE

## 2023-08-27 RX ORDER — INSULIN GLARGINE 100 [IU]/ML
51 INJECTION, SOLUTION SUBCUTANEOUS ONCE
Refills: 0 | Status: COMPLETED | OUTPATIENT
Start: 2023-08-27 | End: 2023-08-27

## 2023-08-27 RX ORDER — PIPERACILLIN AND TAZOBACTAM 4; .5 G/20ML; G/20ML
3.38 INJECTION, POWDER, LYOPHILIZED, FOR SOLUTION INTRAVENOUS EVERY 8 HOURS
Refills: 0 | Status: DISCONTINUED | OUTPATIENT
Start: 2023-08-28 | End: 2023-08-29

## 2023-08-27 RX ORDER — LOSARTAN/HYDROCHLOROTHIAZIDE 100MG-25MG
1 TABLET ORAL
Refills: 0 | DISCHARGE

## 2023-08-27 RX ORDER — ACETAMINOPHEN 500 MG
2 TABLET ORAL
Qty: 0 | Refills: 0 | DISCHARGE

## 2023-08-27 RX ORDER — SODIUM CHLORIDE 9 MG/ML
1000 INJECTION, SOLUTION INTRAVENOUS
Refills: 0 | Status: DISCONTINUED | OUTPATIENT
Start: 2023-08-27 | End: 2023-08-29

## 2023-08-27 RX ADMIN — DAPTOMYCIN 125.6 MILLIGRAM(S): 500 INJECTION, POWDER, LYOPHILIZED, FOR SOLUTION INTRAVENOUS at 07:17

## 2023-08-27 RX ADMIN — Medication 3 UNIT(S): at 09:11

## 2023-08-27 RX ADMIN — Medication 3: at 18:05

## 2023-08-27 RX ADMIN — Medication 4: at 12:56

## 2023-08-27 RX ADMIN — Medication 2 TABLET(S): at 06:02

## 2023-08-27 RX ADMIN — INSULIN GLARGINE 51 UNIT(S): 100 INJECTION, SOLUTION SUBCUTANEOUS at 01:15

## 2023-08-27 RX ADMIN — Medication 3: at 09:10

## 2023-08-27 RX ADMIN — Medication 3 UNIT(S): at 18:06

## 2023-08-27 RX ADMIN — Medication 275 MICROGRAM(S): at 06:02

## 2023-08-27 RX ADMIN — Medication 3 UNIT(S): at 12:57

## 2023-08-27 RX ADMIN — INSULIN GLARGINE 60 UNIT(S): 100 INJECTION, SOLUTION SUBCUTANEOUS at 21:56

## 2023-08-27 RX ADMIN — PIPERACILLIN AND TAZOBACTAM 200 GRAM(S): 4; .5 INJECTION, POWDER, LYOPHILIZED, FOR SOLUTION INTRAVENOUS at 19:40

## 2023-08-27 RX ADMIN — CHLORHEXIDINE GLUCONATE 1 APPLICATION(S): 213 SOLUTION TOPICAL at 11:23

## 2023-08-27 RX ADMIN — Medication 4 UNIT(S): at 01:15

## 2023-08-27 RX ADMIN — Medication 2: at 00:21

## 2023-08-27 RX ADMIN — PIPERACILLIN AND TAZOBACTAM 25 GRAM(S): 4; .5 INJECTION, POWDER, LYOPHILIZED, FOR SOLUTION INTRAVENOUS at 22:09

## 2023-08-27 RX ADMIN — SODIUM CHLORIDE 1000 MILLILITER(S): 9 INJECTION, SOLUTION INTRAVENOUS at 02:52

## 2023-08-27 RX ADMIN — SODIUM CHLORIDE 75 MILLILITER(S): 9 INJECTION, SOLUTION INTRAVENOUS at 07:54

## 2023-08-27 RX ADMIN — ATORVASTATIN CALCIUM 20 MILLIGRAM(S): 80 TABLET, FILM COATED ORAL at 21:57

## 2023-08-27 RX ADMIN — Medication 2: at 22:00

## 2023-08-27 NOTE — CONSULT NOTE ADULT - ASSESSMENT
WORK UP:      ANTIBIOTIC:      DIAGNOSIS and IMPRESSION:        RECOMMENDATIONS:        Above recommendations are Preliminary until Attending's Addendum which includes Final Recommendations      Shabbir Soliman DO, PGY-5   ID fellow  [employer] Teams Preferred  After 5pm/weekends call [phone]   37M with DM (A1c 10), HTN, HLD, thyroid cancer s/p resection presents with worsening L thigh redness for 3-4 days, found to be febrile 100.5F, no leukocytosis, CT showing L thigh cellulitis and 3.2cm collection concerning for abscess, s/p I&D with prelim GS showing GPC/GNR, s/p vancomycin in the ED, and developed redness and flushing, switched antibiotic to daptomycin, ID consulted for assistance.    Pt reports 3-4 days ago he noticed a pimple-like lesion on his L thigh that grew bigger and worsened in the last 2 days  Denies any scratches or bug bites.   Does have a pet dog but denies any fleas or ticks around dog.  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

## 2023-08-27 NOTE — PROGRESS NOTE ADULT - SUBJECTIVE AND OBJECTIVE BOX
PROGRESS NOTE:    Ne Luciano MD  Internal Medicine PGY-3  Available on [employer] Teams      Patient is a 37y old  Male who presents with a chief complaint of Cellulitis w/ abscess (27 Aug 2023 01:19)      SUBJECTIVE / OVERNIGHT EVENTS: No acute overnight events. Pt seen and examined. Denies fevers, chills, CP, SOB, Abdominal pain, N/V, Constipation, Diarrhea      MEDICATIONS  (STANDING):  atorvastatin 20 milliGRAM(s) Oral at bedtime  DAPTOmycin IVPB 640 milliGRAM(s) IV Intermittent every 24 hours  dextrose 5%. 1000 milliLiter(s) (100 mL/Hr) IV Continuous <Continuous>  dextrose 5%. 1000 milliLiter(s) (50 mL/Hr) IV Continuous <Continuous>  dextrose 50% Injectable 25 Gram(s) IV Push once  dextrose 50% Injectable 12.5 Gram(s) IV Push once  dextrose 50% Injectable 25 Gram(s) IV Push once  glucagon  Injectable 1 milliGRAM(s) IntraMuscular once  insulin glargine Injectable (LANTUS) 51 Unit(s) SubCutaneous at bedtime  insulin lispro (ADMELOG) corrective regimen sliding scale   SubCutaneous three times a day before meals  insulin lispro (ADMELOG) corrective regimen sliding scale   SubCutaneous at bedtime  insulin lispro Injectable (ADMELOG) 3 Unit(s) SubCutaneous three times a day before meals  lactated ringers. 1000 milliLiter(s) (75 mL/Hr) IV Continuous <Continuous>  levothyroxine 275 MICROGram(s) Oral daily    MEDICATIONS  (PRN):  acetaminophen     Tablet .. 650 milliGRAM(s) Oral every 6 hours PRN Temp greater or equal to 38C (100.4F), Mild Pain (1 - 3)  dextrose Oral Gel 15 Gram(s) Oral once PRN Blood Glucose LESS THAN 70 milliGRAM(s)/deciliter  melatonin 3 milliGRAM(s) Oral at bedtime PRN Insomnia      I&O's Summary      Vital Signs Last 24 Hrs  T(C): 36.7 (27 Aug 2023 06:16), Max: 38.1 (26 Aug 2023 15:28)  T(F): 98 (27 Aug 2023 06:16), Max: 100.5 (26 Aug 2023 15:28)  HR: 86 (27 Aug 2023 06:16) (86 - 138)  BP: 97/52 (27 Aug 2023 06:16) (97/52 - 126/69)  BP(mean): --  RR: 18 (27 Aug 2023 06:16) (18 - 23)  SpO2: 97% (27 Aug 2023 06:16) (96% - 98%)    Parameters below as of 27 Aug 2023 06:16  Patient On (Oxygen Delivery Method): room air        =================PHYSICAL EXAM=================    GENERAL: NAD, lying in bed comfortably  HEAD:  Atraumatic, Normocephalic  EYES: EOMI, PERRLA, conjunctiva and sclera clear  ENT: Moist mucous membranes  NECK: Supple, No JVD  CHEST/LUNG: Clear to auscultation bilaterally; No rales, rhonchi, wheezing, or rubs. Unlabored respirations  HEART: Regular rate and rhythm; No murmurs, rubs, or gallops  ABDOMEN: Bowel sounds present; Soft, Nontender, Nondistended. No hepatomegally  EXTREMITIES:  2+ Peripheral Pulses, brisk capillary refill. No clubbing, cyanosis, or edema  NERVOUS SYSTEM:  Alert & Oriented X3, speech clear. No deficits   MSK: FROM all 4 extremities, full and equal strength  SKIN: No rashes or lesions    =================================================    LABS:                        15.6   8.29  )-----------( 171      ( 26 Aug 2023 16:08 )             44.6     Auto Eosinophil # 0.12  / Auto Eosinophil % 1.4   / Auto Neutrophil # 6.13  / Auto Neutrophil % 73.9  / BANDS % x            134<L>  |  95<L>  |  14  ----------------------------<  494<HH>  4.1   |  24  |  0.77    Ca    9.5      26 Aug 2023 16:08  Mg     1.9       Phos  3.7       TPro  7.5  /  Alb  4.3  /  TBili  1.0  /  DBili  x   /  AST  22  /  ALT  45  /  AlkPhos  63  08-26    PT/INR - ( 26 Aug 2023 16:08 )   PT: 10.5 sec;   INR: 1.00 ratio         PTT - ( 26 Aug 2023 16:08 )  PTT:27.3 sec      Urinalysis Basic - ( 26 Aug 2023 16:25 )    Color: Light Yellow / Appearance: Clear / S.034 / pH: x  Gluc: x / Ketone: Small  / Bili: Negative / Urobili: Negative   Blood: x / Protein: Negative / Nitrite: Negative   Leuk Esterase: Negative / RBC: x / WBC x   Sq Epi: x / Non Sq Epi: x / Bacteria: x            RADIOLOGY & ADDITIONAL TESTS:    Imaging Personally Reviewed:    Consultant(s) Notes Reviewed:      Care Discussed with Consultants/Other Providers:   PROGRESS NOTE:    Ne Luciano MD  Internal Medicine PGY-3  Available on [employer] Teams      Patient is a 37y old  Male who presents with a chief complaint of Cellulitis w/ abscess (27 Aug 2023 01:19)      SUBJECTIVE / OVERNIGHT EVENTS: No acute overnight events. Pt seen and examined. He states that there is less pain and redness at the site of the abscess since it was drained by surgery.  Denies fevers, chills, CP, SOB, Abdominal pain, N/V, Constipation, Diarrhea      MEDICATIONS  (STANDING):  atorvastatin 20 milliGRAM(s) Oral at bedtime  DAPTOmycin IVPB 640 milliGRAM(s) IV Intermittent every 24 hours  dextrose 5%. 1000 milliLiter(s) (100 mL/Hr) IV Continuous <Continuous>  dextrose 5%. 1000 milliLiter(s) (50 mL/Hr) IV Continuous <Continuous>  dextrose 50% Injectable 25 Gram(s) IV Push once  dextrose 50% Injectable 12.5 Gram(s) IV Push once  dextrose 50% Injectable 25 Gram(s) IV Push once  glucagon  Injectable 1 milliGRAM(s) IntraMuscular once  insulin glargine Injectable (LANTUS) 51 Unit(s) SubCutaneous at bedtime  insulin lispro (ADMELOG) corrective regimen sliding scale   SubCutaneous three times a day before meals  insulin lispro (ADMELOG) corrective regimen sliding scale   SubCutaneous at bedtime  insulin lispro Injectable (ADMELOG) 3 Unit(s) SubCutaneous three times a day before meals  lactated ringers. 1000 milliLiter(s) (75 mL/Hr) IV Continuous <Continuous>  levothyroxine 275 MICROGram(s) Oral daily    MEDICATIONS  (PRN):  acetaminophen     Tablet .. 650 milliGRAM(s) Oral every 6 hours PRN Temp greater or equal to 38C (100.4F), Mild Pain (1 - 3)  dextrose Oral Gel 15 Gram(s) Oral once PRN Blood Glucose LESS THAN 70 milliGRAM(s)/deciliter  melatonin 3 milliGRAM(s) Oral at bedtime PRN Insomnia      I&O's Summary      Vital Signs Last 24 Hrs  T(C): 36.7 (27 Aug 2023 06:16), Max: 38.1 (26 Aug 2023 15:28)  T(F): 98 (27 Aug 2023 06:16), Max: 100.5 (26 Aug 2023 15:28)  HR: 86 (27 Aug 2023 06:16) (86 - 138)  BP: 97/52 (27 Aug 2023 06:16) (97/52 - 126/69)  BP(mean): --  RR: 18 (27 Aug 2023 06:16) (18 - 23)  SpO2: 97% (27 Aug 2023 06:16) (96% - 98%)    Parameters below as of 27 Aug 2023 06:16  Patient On (Oxygen Delivery Method): room air        =================PHYSICAL EXAM=================    GENERAL: Young gentleman in NAD, sitting in bed comfortably  EYES: EOMI, PERRLA, conjunctiva and sclera clear  ENT: Moist mucous membranes  CHEST/LUNG: Clear to auscultation bilaterally; No rales, rhonchi, wheezing, or rubs. Unlabored respirations  HEART: Regular rate and rhythm; No murmurs, rubs, or gallops  ABDOMEN: Bowel sounds present; Soft, Nontender, Nondistended. No hepatomegally  EXTREMITIES:  2+ Peripheral Pulses, brisk capillary refill. No clubbing, cyanosis, or edema  NERVOUS SYSTEM:  Alert & Oriented X3, speech clear. No deficits   MSK: FROM all 4 extremities, full and equal strength  SKIN: Medial left thigh with packing, minimal drainage, with erythema that has receded the marked borders, no TTP      =================================================    LABS:                        15.6   8.29  )-----------( 171      ( 26 Aug 2023 16:08 )             44.6     Auto Eosinophil # 0.12  / Auto Eosinophil % 1.4   / Auto Neutrophil # 6.13  / Auto Neutrophil % 73.9  / BANDS % x        -    134<L>  |  95<L>  |  14  ----------------------------<  494<HH>  4.1   |  24  |  0.77    Ca    9.5      26 Aug 2023 16:08  Mg     1.9       Phos  3.7     08-26  TPro  7.5  /  Alb  4.3  /  TBili  1.0  /  DBili  x   /  AST  22  /  ALT  45  /  AlkPhos  63      PT/INR - ( 26 Aug 2023 16:08 )   PT: 10.5 sec;   INR: 1.00 ratio         PTT - ( 26 Aug 2023 16:08 )  PTT:27.3 sec      Urinalysis Basic - ( 26 Aug 2023 16:25 )    Color: Light Yellow / Appearance: Clear / S.034 / pH: x  Gluc: x / Ketone: Small  / Bili: Negative / Urobili: Negative   Blood: x / Protein: Negative / Nitrite: Negative   Leuk Esterase: Negative / RBC: x / WBC x   Sq Epi: x / Non Sq Epi: x / Bacteria: x            RADIOLOGY & ADDITIONAL TESTS:    Imaging Personally Reviewed:    Consultant(s) Notes Reviewed:      Care Discussed with Consultants/Other Providers:

## 2023-08-27 NOTE — H&P ADULT - NSHPLABSRESULTS_GEN_ALL_CORE
LABS:                           15.6   8.29  )-----------( 171      ( 26 Aug 2023 16:08 )             44.6         134<L>  |  95<L>  |  14  ----------------------------<  494<HH>  4.1   |  24  |  0.77    Ca    9.5      26 Aug 2023 16:08  Phos  3.7       Mg     1.9         TPro  7.5  /  Alb  4.3  /  TBili  1.0  /  DBili  x   /  AST  22  /  ALT  45  /  AlkPhos  63          PT/INR - ( 26 Aug 2023 16:08 )   PT: 10.5 sec;   INR: 1.00 ratio         PTT - ( 26 Aug 2023 16:08 )  PTT:27.3 sec      Urinalysis Basic - ( 26 Aug 2023 16:25 )    Color: Light Yellow / Appearance: Clear / S.034 / pH: x  Gluc: x / Ketone: Small  / Bili: Negative / Urobili: Negative   Blood: x / Protein: Negative / Nitrite: Negative   Leuk Esterase: Negative / RBC: x / WBC x   Sq Epi: x / Non Sq Epi: x / Bacteria: x        LIVER FUNCTIONS - ( 26 Aug 2023 16:08 )  Alb: 4.3 g/dL / Pro: 7.5 g/dL / ALK PHOS: 63 U/L / ALT: 45 U/L / AST: 22 U/L / GGT: x             Blood Gas Profile w/Lytes - Venous: Performed In Lab (23 @ 15:50)    Blood Gas Profile w/Lytes - Venous: Performed In Lab (23 @ 15:50)    I&O's Summary         /     CAPILLARY BLOOD GLUCOSE      POCT Blood Glucose.: 317 mg/dL (27 Aug 2023 00:20)  POCT Blood Glucose.: 401 mg/dL (26 Aug 2023 17:34)            MICRO:

## 2023-08-27 NOTE — H&P ADULT - ATTENDING COMMENTS
This is a 38 y/o male w/ PMHx IDDM2, HTN, HLD, morbid obesity, and thyroid CA s/p resection who presents for left thigh redness and swelling. The symptoms started about a week ago after he noticed a pimple on his thigh and scratched it. The area then started to become swollen and increasingly red, especially over the past 2 days. He was febrile to 100.5 w/  in the ED. CT scan was done on the L thigh to r/o necrotizing fasciitis due to the size and induration, and the patient was given Vancomycin/Zosyn/Clindamycin. CT scan revealed areas of focal skin thickening including a focal area of skin thickening in the anteromedial left thigh superiorly with underlying inflammatory change and slightly posteroinferiorly, in the medial thigh, a second area of focal skin thickening inclusive of a small intradermal collection measuring 3.2 x 1.5 x 1.6 cm with subjacent subcutaneous inflammatory change, suggesting small abscess/superinfected dermal cyst. Of note the patient had itching and redness of the face and neck shortly after vancomycin infusion, and required benadryl. I&D of abscess done by surgery.    ROS:  +redness, pain, and swelling of left thigh  All other ROS negative    Physical exam:  Medial L thigh w/ packing, malodorous, serosanguineous discharge draining, lateral to the area there is another raised, indurated area that is tender to touch, no crepitus noted, erythema has receded from marked borders  Sensation intact in affected area  Not in acute distress  Obese  +S1S2, no murmurs/rubs/gallops, no pitting edema  Lungs CTA bilaterally, no wheezes, rales rhonchi  AAOx3, no focal deficits    Assessment/Plan   38 y/o male w/ PMHx IDDM2, HTN, HLD, morbid obesity, and thyroid CA s/p resection presenting for L thigh swelling, redness and pain, markedly worsening over the last 2 days. Found to meet SIRS criteria with cellulitis w/ abscess of L medial thigh. Admitted for IV abx for purulent cellulitis.    #Purulent Cellulitis  #DM  #HTN  #Thyroid CA s/p resection    - Start Daptomycin 4mg/kg - patient had allergic reaction to Vancomycin in ED  - F/u blood and abscess cultures, adjust treatment accordingly  - Monitor for s/s of rhabdomyolysis while on Daptomycin  - Lantus 54u + Lispro 3u TID + ISS while inpatient  - C/w levothyroxine 275mcg daily This is a 38 y/o male w/ PMHx IDDM2, HTN, HLD, morbid obesity, and thyroid CA s/p resection who presents for left thigh redness and swelling. The symptoms started about a week ago after he noticed a pimple on his thigh and scratched it. The area then started to become swollen and increasingly red, especially over the past 2 days. He was febrile to 100.5 w/  in the ED. CT scan was done on the L thigh to r/o necrotizing fasciitis due to the size and induration, and the patient was given Vancomycin/Zosyn/Clindamycin. CT scan revealed areas of focal skin thickening including a focal area of skin thickening in the anteromedial left thigh superiorly with underlying inflammatory change and slightly posteroinferiorly, in the medial thigh, a second area of focal skin thickening inclusive of a small intradermal collection measuring 3.2 x 1.5 x 1.6 cm with subjacent subcutaneous inflammatory change, suggesting small abscess/superinfected dermal cyst. Of note the patient had itching and redness of the face and neck shortly after vancomycin infusion, and required benadryl. I&D of abscess done by surgery.    ROS:  +redness, pain, and swelling of left thigh  All other ROS negative    Physical exam:  Medial L thigh w/ packing, malodorous, serosanguineous discharge draining, lateral to the area there is another raised, indurated area that is tender to touch, no crepitus noted, erythema has receded from marked borders  Sensation intact in affected area  Not in acute distress  Obese  +S1S2, no murmurs/rubs/gallops, no pitting edema  Lungs CTA bilaterally, no wheezes, rales rhonchi  AAOx3, no focal deficits    Assessment/Plan   38 y/o male w/ PMHx IDDM2, HTN, HLD, morbid obesity, and thyroid CA s/p resection presenting for L thigh swelling, redness and pain, markedly worsening over the last 2 days. Found to meet SIRS criteria with cellulitis w/ abscess of L medial thigh. Admitted for IV abx for purulent cellulitis.    #Purulent Cellulitis  #DM  #HTN  #Thyroid CA s/p resection    - Start Daptomycin 4mg/kg - patient had allergic reaction to Vancomycin in ED  - S/p I&D - F/u blood and abscess cultures, adjust treatment accordingly  - Monitor for s/s of rhabdomyolysis while on Daptomycin  - Lantus 54u + Lispro 3u TID + ISS while inpatient  - C/w levothyroxine 275mcg daily This is a 36 y/o male w/ PMHx IDDM2, HTN, HLD, morbid obesity, and thyroid CA s/p resection who presents for left thigh redness and swelling. The symptoms started about a week ago after he noticed a pimple on his thigh and scratched it. The area then started to become swollen and increasingly red, especially over the past 2 days. He was febrile to 100.5 w/  in the ED. CT scan was done on the L thigh to r/o necrotizing fasciitis due to the size and induration, and the patient was given Vancomycin/Zosyn/Clindamycin. CT scan revealed areas of focal skin thickening including a focal area of skin thickening in the anteromedial left thigh superiorly with underlying inflammatory change and slightly posteroinferiorly, in the medial thigh, a second area of focal skin thickening inclusive of a small intradermal collection measuring 3.2 x 1.5 x 1.6 cm with subjacent subcutaneous inflammatory change, suggesting small abscess/superinfected dermal cyst. Of note the patient had itching and redness of the face and neck shortly after vancomycin infusion, and required benadryl. I&D of abscess done by surgery.    ROS:  +redness, pain, and swelling of left thigh  All other ROS negative    Physical exam:  Medial L thigh w/ packing, malodorous, serosanguineous discharge draining, lateral to the area there is another raised, indurated area that is tender to touch, no crepitus noted, erythema has receded from marked borders  Sensation intact in affected area  Not in acute distress  Obese  +S1S2, no murmurs/rubs/gallops, no pitting edema  Lungs CTA bilaterally, no wheezes, rales rhonchi  AAOx3, no focal deficits    Assessment/Plan   36 y/o male w/ PMHx IDDM2, HTN, HLD, morbid obesity, and thyroid CA s/p resection presenting for L thigh swelling, redness and pain, markedly worsening over the last 2 days. Found to meet SIRS criteria with cellulitis w/ abscess of L medial thigh. Admitted for IV abx for purulent cellulitis.    #Purulent Cellulitis  #DM  #HTN  #Thyroid CA s/p resection    - CT imaging personally reviewed, inflammatory changes of L medial thigh with skin collection noted  - Start Daptomycin 4mg/kg - patient had allergic reaction to Vancomycin in ED  - S/p I&D - F/u blood and abscess cultures, adjust treatment accordingly  - Monitor for s/s of rhabdomyolysis while on Daptomycin  - Lantus 54u + Lispro 3u TID + ISS while inpatient  - C/w levothyroxine 275mcg daily

## 2023-08-27 NOTE — PROGRESS NOTE ADULT - ASSESSMENT
36yo M with PMH DM2 (A1c 8.8 6/2023), HTN, HLD, thyroid cancer s/p resection, numerous back abscesses presenting with L thigh cellulitis and abscess and found to be septic. S/p I&D by surgery

## 2023-08-27 NOTE — CONSULT NOTE ADULT - SUBJECTIVE AND OBJECTIVE BOX
Patient is a 37y old  Male who presents with a chief complaint of Cellulitis w/ abscess (27 Aug 2023 07:00)    HPI:  37M with DM (A1c 10), HTN, HLD, thyroid cancer s/p resection presents with worsening L thigh redness for 3-4 days, found to be febrile 100.5F, no leukocytosis, CT showing L thigh cellulitis and 3.2cm collection concerning for abscess, s/p I&D with prelim GS showing GPC/GNR, s/p vancomycin in the ED, and developed redness and flushing, switched antibiotic to daptomycin, ID consulted for assistance.    Patient --     Pt reports 3-4 days ago he noticed a pimple-like lesion on his L thigh that grew bigger and worsened in the last 2 days.   Denies any scratches or bug bites.   Does have a pet dog but denies any fleas or ticks around dog.      prior hospital charts reviewed [  ]  primary team notes reviewed [  ]  other consultant notes reviewed [  ]    PAST MEDICAL & SURGICAL HISTORY:  History of heartburn      Morbid obesity with body mass index (BMI) of 40.0 to 49.9      Type 2 diabetes mellitus      Thyroid malignant neoplasm      History of tonsillectomy and adenoidectomy  as a child          Allergies  No Known Allergies    ANTIMICROBIALS (past 90 days)  MEDICATIONS  (STANDING):  clindamycin IVPB   100 mL/Hr IV Intermittent (08-26-23 @ 16:42)    DAPTOmycin IVPB   125.6 mL/Hr IV Intermittent (08-27-23 @ 07:17)    piperacillin/tazobactam IVPB.   200 mL/Hr IV Intermittent (08-26-23 @ 15:39)    piperacillin/tazobactam IVPB..   25 mL/Hr IV Intermittent (08-26-23 @ 20:19)    trimethoprim  160 mG/sulfamethoxazole 800 mG   2 Tablet(s) Oral (08-27-23 @ 06:02)    vancomycin  IVPB.   250 mL/Hr IV Intermittent (08-26-23 @ 16:15)    s/p Vancomycin 8/26  s/p Bactrim 8/27  s/p zosyn 8/26  s/p Clinda 8/26    DAPTOmycin IVPB 640 every 24 hours (8/27 --> )    MEDICATIONS  (STANDING):  acetaminophen     Tablet .. 650 every 6 hours PRN  atorvastatin 20 at bedtime  dextrose 50% Injectable 25 once  dextrose 50% Injectable 12.5 once  dextrose 50% Injectable 25 once  dextrose Oral Gel 15 once PRN  glucagon  Injectable 1 once  insulin glargine Injectable (LANTUS) 60 at bedtime  insulin lispro (ADMELOG) corrective regimen sliding scale  three times a day before meals  insulin lispro (ADMELOG) corrective regimen sliding scale  at bedtime  insulin lispro Injectable (ADMELOG) 3 three times a day before meals  levothyroxine 275 daily  melatonin 3 at bedtime PRN    SOCIAL HISTORY:       FAMILY HISTORY:  Family history of colon cancer in mother  throat      REVIEW OF SYSTEMS  [  ] ROS unobtainable because:    [  ] All other systems negative except as noted below:	    Constitutional:  [ ] fever [ ] chills  [ ] weight loss  [ ] weakness  Skin:  [ ] rash [ ] phlebitis	  Eyes: [ ] icterus [ ] pain  [ ] discharge	  ENMT: [ ] sore throat  [ ] thrush [ ] ulcers [ ] exudates  Respiratory: [ ] dyspnea [ ] hemoptysis [ ] cough [ ] sputum	  Cardiovascular:  [ ] chest pain [ ] palpitations [ ] edema	  Gastrointestinal:  [ ] nausea [ ] vomiting [ ] diarrhea [ ] constipation [ ] pain	  Genitourinary:  [ ] dysuria [ ] frequency [ ] hematuria [ ] discharge [ ] flank pain  [ ] incontinence  Musculoskeletal:  [ ] myalgias [ ] arthralgias [ ] arthritis  [ ] back pain  Neurological:  [ ] headache [ ] seizures  [ ] confusion/altered mental status  Psychiatric:  [ ] anxiety [ ] depression	  Hematology/Lymphatics:  [ ] lymphadenopathy  Endocrine:  [ ] adrenal [ ] thyroid  Allergic/Immunologic:	 [ ] transplant [ ] seasonal    Vital Signs Last 24 Hrs  T(F): 98.2 (08-27-23 @ 14:29), Max: 100.5 (08-26-23 @ 15:28)  Vital Signs Last 24 Hrs  HR: 87 (08-27-23 @ 14:29) (86 - 108)  BP: 113/77 (08-27-23 @ 14:29) (97/52 - 115/72)  RR: 18 (08-27-23 @ 14:29)  SpO2: 97% (08-27-23 @ 14:29) (96% - 98%)  Wt(kg): --    PHYSICAL EXAM:                              12.8   6.88  )-----------( 157      ( 27 Aug 2023 07:31 )             38.0   08-27    138  |  104  |  15  ----------------------------<  269<H>  3.6   |  22  |  0.76    Ca    8.0<L>      27 Aug 2023 07:31  Phos  3.5     08-27  Mg     1.7     08-27    TPro  5.8<L>  /  Alb  3.4  /  TBili  0.4  /  DBili  x   /  AST  23  /  ALT  38  /  AlkPhos  48  08-27    Urinalysis Basic - ( 27 Aug 2023 07:31 )    Color: x / Appearance: x / SG: x / pH: x  Gluc: 269 mg/dL / Ketone: x  / Bili: x / Urobili: x   Blood: x / Protein: x / Nitrite: x   Leuk Esterase: x / RBC: x / WBC x   Sq Epi: x / Non Sq Epi: x / Bacteria: x    MICROBIOLOGY:  Culture - Abscess with Gram Stain (collected 26 Aug 2023 21:08)  Source: .Abscess wound  Gram Stain (27 Aug 2023 08:10):    Few polymorphonuclear leukocytes seen per low power field    Rare Gram positive cocci in pairs seen per oil power field    Rare Gram Negative Rods seen per oil power field      RADIOLOGY:  imaging below personally reviewed and agree with findings  < from: CT Lower Extremity w/ IV Cont, Left (08.26.23 @ 18:18) >  IMPRESSION:  Medial left thigh cellulitis with a couple areas of more focal skin   thickening including at the anteromedial upper thigh without abscess and   more inferiorly in the medial thigh inclusive of a 3.2 cm intradermal   collection, compatible with abscess/superinfected cyst.    No CT evidence of acute intra-abdominal pathology.    < end of copied text >   Patient is a 37y old  Male who presents with a chief complaint of Cellulitis w/ abscess (27 Aug 2023 07:00)    HPI:  37M with DM (A1c 10), HTN, HLD, thyroid cancer s/p resection presents with worsening L thigh redness for 3-4 days, found to be febrile 100.5F, no leukocytosis, CT showing L thigh cellulitis and 3.2cm collection concerning for abscess, s/p I&D with prelim GS showing GPC/GNR, s/p vancomycin in the ED, and developed redness and flushing, switched antibiotic to daptomycin, ID consulted for assistance.    Pt reports 3-4 days ago he noticed a pimple-like lesion on his L thigh that grew bigger and worsened in the last 2 days  Denies any scratches or bug bites.   Does have a pet dog but denies any fleas or ticks around dog.  Works    Admits tobacco use, Denies alcohol, recreational drug use    prior hospital charts reviewed [ x ]  primary team notes reviewed [ x ]  other consultant notes reviewed [ x ]    PAST MEDICAL & SURGICAL HISTORY:  History of heartburn      Morbid obesity with body mass index (BMI) of 40.0 to 49.9      Type 2 diabetes mellitus      Thyroid malignant neoplasm      History of tonsillectomy and adenoidectomy  as a child          Allergies  No Known Allergies    ANTIMICROBIALS (past 90 days)  MEDICATIONS  (STANDING):  clindamycin IVPB   100 mL/Hr IV Intermittent (08-26-23 @ 16:42)    DAPTOmycin IVPB   125.6 mL/Hr IV Intermittent (08-27-23 @ 07:17)    piperacillin/tazobactam IVPB.   200 mL/Hr IV Intermittent (08-26-23 @ 15:39)    piperacillin/tazobactam IVPB..   25 mL/Hr IV Intermittent (08-26-23 @ 20:19)    trimethoprim  160 mG/sulfamethoxazole 800 mG   2 Tablet(s) Oral (08-27-23 @ 06:02)    vancomycin  IVPB.   250 mL/Hr IV Intermittent (08-26-23 @ 16:15)    s/p Vancomycin 8/26  s/p Bactrim 8/27  s/p zosyn 8/26  s/p Clinda 8/26    DAPTOmycin IVPB 640 every 24 hours (8/27 --> )    MEDICATIONS  (STANDING):  acetaminophen     Tablet .. 650 every 6 hours PRN  atorvastatin 20 at bedtime  dextrose 50% Injectable 25 once  dextrose 50% Injectable 12.5 once  dextrose 50% Injectable 25 once  dextrose Oral Gel 15 once PRN  glucagon  Injectable 1 once  insulin glargine Injectable (LANTUS) 60 at bedtime  insulin lispro (ADMELOG) corrective regimen sliding scale  three times a day before meals  insulin lispro (ADMELOG) corrective regimen sliding scale  at bedtime  insulin lispro Injectable (ADMELOG) 3 three times a day before meals  levothyroxine 275 daily  melatonin 3 at bedtime PRN    SOCIAL HISTORY:   Admits tobacco use, Denies alcohol, recreational drug use    FAMILY HISTORY:  Family history of colon cancer in mother  throat      REVIEW OF SYSTEMS  [  ] ROS unobtainable because:    [ x ] All other systems negative except as noted below:	    Constitutional:  [ ] fever [ ] chills  [ ] weight loss  [ ] weakness  Skin:  [ ] rash [ ] phlebitis	  Eyes: [ ] icterus [ ] pain  [ ] discharge	  ENMT: [ ] sore throat  [ ] thrush [ ] ulcers [ ] exudates  Respiratory: [ ] dyspnea [ ] hemoptysis [ ] cough [ ] sputum	  Cardiovascular:  [ ] chest pain [ ] palpitations [ ] edema	  Gastrointestinal:  [ ] nausea [ ] vomiting [ ] diarrhea [ ] constipation [ ] pain	  Genitourinary:  [ ] dysuria [ ] frequency [ ] hematuria [ ] discharge [ ] flank pain  [ ] incontinence  Musculoskeletal:  [ ] myalgias [ ] arthralgias [ ] arthritis  [ ] back pain  Neurological:  [ ] headache [ ] seizures  [ ] confusion/altered mental status  Psychiatric:  [ ] anxiety [ ] depression	  Hematology/Lymphatics:  [ ] lymphadenopathy  Endocrine:  [ ] adrenal [ ] thyroid  Allergic/Immunologic:	 [ ] transplant [ ] seasonal    Vital Signs Last 24 Hrs  T(F): 98.2 (08-27-23 @ 14:29), Max: 100.5 (08-26-23 @ 15:28)  Vital Signs Last 24 Hrs  HR: 87 (08-27-23 @ 14:29) (86 - 108)  BP: 113/77 (08-27-23 @ 14:29) (97/52 - 115/72)  RR: 18 (08-27-23 @ 14:29)  SpO2: 97% (08-27-23 @ 14:29) (96% - 98%)  Wt(kg): --    Physical Exam:  Constitutional:  obese, comfortable  Head/Eyes: no icterus  ENT:  supple, no cervical lymphadenopathy   LUNGS:  CTA  CVS:  regular rhythm, no murmur  Abd:  soft, non-tender; non-distended  Ext:  +L thigh erythema, mild TTP, 2 I&D sites with packing  Vascular:  IV site no erythema tenderness or discharge  MSK:  joints without swelling  Neuro: AAO X 3, non- focal                                12.8   6.88  )-----------( 157      ( 27 Aug 2023 07:31 )             38.0   08-27    138  |  104  |  15  ----------------------------<  269<H>  3.6   |  22  |  0.76    Ca    8.0<L>      27 Aug 2023 07:31  Phos  3.5     08-27  Mg     1.7     08-27    TPro  5.8<L>  /  Alb  3.4  /  TBili  0.4  /  DBili  x   /  AST  23  /  ALT  38  /  AlkPhos  48  08-27    Urinalysis Basic - ( 27 Aug 2023 07:31 )    Color: x / Appearance: x / SG: x / pH: x  Gluc: 269 mg/dL / Ketone: x  / Bili: x / Urobili: x   Blood: x / Protein: x / Nitrite: x   Leuk Esterase: x / RBC: x / WBC x   Sq Epi: x / Non Sq Epi: x / Bacteria: x    MICROBIOLOGY:  Culture - Abscess with Gram Stain (collected 26 Aug 2023 21:08)  Source: .Abscess wound  Gram Stain (27 Aug 2023 08:10):    Few polymorphonuclear leukocytes seen per low power field    Rare Gram positive cocci in pairs seen per oil power field    Rare Gram Negative Rods seen per oil power field      RADIOLOGY:  imaging below personally reviewed and agree with findings  < from: CT Lower Extremity w/ IV Cont, Left (08.26.23 @ 18:18) >  IMPRESSION:  Medial left thigh cellulitis with a couple areas of more focal skin   thickening including at the anteromedial upper thigh without abscess and   more inferiorly in the medial thigh inclusive of a 3.2 cm intradermal   collection, compatible with abscess/superinfected cyst.    No CT evidence of acute intra-abdominal pathology.    < end of copied text >

## 2023-08-27 NOTE — CONSULT NOTE ADULT - ATTENDING COMMENTS
Patient seen and examined by me in ED. 37 year old man with history of uncontrolled diabetis presenting with left medial thigh cellulitis and hyperglycemia. No clinical evidence of NSTI at this time. There were 2 discrete areas of fluctuance of the left medial thigh which were drained at the bedside and sent for culture. Recommend medical evaluation for hyperglycemia and IV abx. Surgery to follow and re-assess.
37M with DM (8/27/23: A1c=10.6 ), HTN, HLD, thyroid cancer s/p resection obesity (BMI = 45.1)  was admitted 8/26/23 with worsening L thigh redness for 3-4 days, found to be febrile 100.5F, no leukocytosis, CT showing L thigh cellulitis and 3.2cm collection concerning for abscess, s/p I&D with prelim GS showing GPC/GNR, s/p vancomycin in the ED, and developed redness and flushing, switched antibiotic to daptomycin, ID consulted for assistance.    Pt reports 3-4 days ago he noticed a pimple-like lesion on his L thigh that grew bigger and worsened in the last 2 days  He notes his diabetic medications has been in short supply  - has had difficulty with Ozempic and delayed delivery of Balsaglar insulin  Denies any scratches or bug bites.   Does have a pet dog but denies any fleas or ticks around dog.  Works    Admits tobacco use, Denies alcohol, recreational drug use    Antibiotics  Vancomycin 8/26  Bactrim 8/27   zosyn 8/26  Clinda 8/26  DAPTOmycin IVPB 640 every 24 hours (8/27 --> )    DIAGNOSIS and IMPRESSION:  L Thigh Abscess  with reactive cellulitis  8/26 I&D:  G+C, GNRs   Vanco Valentín Syndrome  poorly controlled DM  (8/27 A1C = 10.6  Obesity  BMI = 45.1    RECOMMENDATIONS:  CHANGE daptomycin to Vancomycin, -- infuse over 2 hours  RESUME Zosyn  - obtain MRSA Swab  - follow up BCx, Wound culture results    importance of glycemic control as strategy to reduce risk of infections was discussed

## 2023-08-27 NOTE — H&P ADULT - NSHPPHYSICALEXAM_GEN_ALL_CORE
VITALS:   T(C): 36.6 (08-26-23 @ 23:00), Max: 38.1 (08-26-23 @ 15:28)  HR: 87 (08-26-23 @ 23:00) (87 - 138)  BP: 104/65 (08-26-23 @ 23:00) (104/65 - 126/69)  RR: 20 (08-26-23 @ 23:00) (20 - 23)  SpO2: 96% (08-26-23 @ 23:00) (96% - 98%)    GENERAL: NAD, lying in bed comfortably  HEAD:  Atraumatic, normocephalic  EYES: EOMI, PERRLA, conjunctiva and sclera clear  HEART: Regular rate and rhythm, no murmurs, rubs, or gallops  LUNGS: Unlabored respirations.  Clear to auscultation bilaterally, no crackles, wheezing, or rhonchi  ABDOMEN: Soft, nontender, nondistended, +BS  EXTREMITIES: 2+ peripheral pulses bilaterally. No clubbing, cyanosis, or edema  NERVOUS SYSTEM:  A&Ox3, no focal deficits   SKIN: L thigh redness demarcated with marker, open lesion s/p I&D covered with gauze appreciated

## 2023-08-27 NOTE — H&P ADULT - ASSESSMENT
38yo M with PMH DM2 (A1c 8.8 6/2023), HTN, HLD, thyroid cancer s/p resection, numerous back abscesses presenting with L thigh cellulitis and abscess and found to be septic.

## 2023-08-27 NOTE — PROVIDER CONTACT NOTE (CRITICAL VALUE NOTIFICATION) - TEST AND RESULT REPORTED:
Abscess culture from 8/26 gram stain, few polymorpho nuclear leukocytes in low power field, rare gram positive cocci in pairs, oil power field rare gram negative rods, rare gram positive cocci in pairs in oil power field.

## 2023-08-27 NOTE — PROGRESS NOTE ADULT - PROBLEM SELECTOR PLAN 1
T100.5, , with cellulitis and abscess  -CT with medial left thigh cellulitis and medial thigh inclusive of a 3.2 cm intradermal collection inferiorly, compatible with abscess/superinfected cyst.  s/p vanc, zosyn, and clinda in ED   s/p I&D of abscess with surgery    IV vanc dosage exceeded acceptable dosing limit given pt's BMI and he had an allergic reaction in the ED, so started Daptomycin 4mg/kg, patient also became red and complained of flushing   - f/u final abscess culture  -f/u  and blood and urine cultures  -f/u ID recs   -c/w Daptomycin

## 2023-08-27 NOTE — H&P ADULT - HISTORY OF PRESENT ILLNESS
38yo M with PMH DM2 (A1c 8.8 6/2023), HTN, HLD, thyroid cancer s/p resection, numerous back abscesses presenting with L thigh cellulitis and abscess. Pt reports 3-4 days ago he noticed a pimple-like lesion on his L thigh that grew bigger and worsened in the last 2 days. Denies any scratches or bug bites. Does have a pet dog but denies any fleas or ticks around dog. Denies fever, n/v/d, chest pain, SOB.     In ED, vitals significant for T100.5 and . Labs and imaging significant for BG 400s, and CT with medical L thigh cellulitis and collection c/w abscess/cyst. S/p vanc/zosyn, clinda, 2L IVF, 10u humulin, and I&D with surgery.

## 2023-08-27 NOTE — H&P ADULT - NSHPREVIEWOFSYSTEMS_GEN_ALL_CORE
REVIEW OF SYSTEMS:  CONSTITUTIONAL: No fever, chills, night sweats, or fatigue  EYES: No eye pain, visual disturbances, or discharge  ENMT:  No difficulty hearing, tinnitus, vertigo; No sinus or throat pain  NECK: No pain or stiffness  RESPIRATORY: No cough, wheezing, or hemoptysis; No shortness of breath  CARDIOVASCULAR: No chest pain, palpitations, dizziness, or leg swelling  GASTROINTESTINAL: No abdominal or epigastric pain. No nausea, vomiting, or hematemesis; No diarrhea or constipation. No melena or hematochezia.  GENITOURINARY: No dysuria, frequency, hematuria, or incontinence  NEUROLOGICAL: No headaches, memory loss, loss of strength, numbness, or tremors  SKIN:++L thigh redness with pimple-like lesion No itching, burning   LYMPH NODES: No enlarged glands  ENDOCRINE: No heat or cold intolerance; No hair loss  MUSCULOSKELETAL: No joint pain or swelling; No muscle, back, or extremity pain  PSYCHIATRIC: No depression, anxiety, mood swings, or difficulty sleeping  HEME/LYMPH: No easy bruising, or bleeding gums  ALLERGY AND IMMUNOLOGIC: No hives or eczema

## 2023-08-27 NOTE — H&P ADULT - PROBLEM SELECTOR PLAN 1
T100.5, , with cellulitis and abscess  - CT with medial left thigh cellulitis and medial thigh inclusive of a 3.2 cm intradermal collection inferiorly, compatible with abscess/superinfected cyst.  - s/p vanc, zosyn, and clinda in ED   - start bactrim (8/27- )  - f/u abscess and blood cultures T100.5, , with cellulitis and abscess  - CT with medial left thigh cellulitis and medial thigh inclusive of a 3.2 cm intradermal collection inferiorly, compatible with abscess/superinfected cyst.  - s/p vanc, zosyn, and clinda in ED   - s/p I&D of abscess with surgery   - start bactrim (8/27- )  - f/u abscess and blood cultures T100.5, , with cellulitis and abscess  - CT with medial left thigh cellulitis and medial thigh inclusive of a 3.2 cm intradermal collection inferiorly, compatible with abscess/superinfected cyst.  - s/p vanc, zosyn, and clinda in ED   - s/p I&D of abscess with surgery   - start bactrim (8/27- ), note that dosing IV vanc exceeds acceptable dosing limit given pt's BMI  - f/u abscess and blood cultures T100.5, , with cellulitis and abscess  - CT with medial left thigh cellulitis and medial thigh inclusive of a 3.2 cm intradermal collection inferiorly, compatible with abscess/superinfected cyst.  - s/p vanc, zosyn, and clinda in ED   - s/p I&D of abscess with surgery   - IV vanc dosage exceeds acceptable dosing limit given pt's BMI and he had an allergic reaction in the ED, so start Daptomycin 4mg/kg   - f/u abscess and blood cultures

## 2023-08-27 NOTE — H&P ADULT - PROBLEM SELECTOR PLAN 4
BG 400s on presentation to ED without acidosis, BG now downtrending   - s/p 10u humulin in ED  - on ozempic and basaglar 68u at home, last A1c 8.8 6/2023  - start 75% of basal insulin, lantus 51u; titrate up as needed   - ISS, monitor BG, CC diet BG 400s on presentation to ED without acidosis, BG now downtrending   - s/p 10u humulin in ED  - on ozempic and basaglar 68u at home, last A1c 8.8 6/2023  - start 75% of basal insulin, lantus 51u and start 3u admelog; titrate up as needed   - ISS, monitor BG, CC diet

## 2023-08-28 ENCOUNTER — TRANSCRIPTION ENCOUNTER (OUTPATIENT)
Age: 38
End: 2023-08-28

## 2023-08-28 LAB
ALBUMIN SERPL ELPH-MCNC: 3.6 G/DL — SIGNIFICANT CHANGE UP (ref 3.3–5)
ALBUMIN SERPL ELPH-MCNC: 3.7 G/DL — SIGNIFICANT CHANGE UP (ref 3.3–5)
ALP SERPL-CCNC: 46 U/L — SIGNIFICANT CHANGE UP (ref 40–120)
ALP SERPL-CCNC: 53 U/L — SIGNIFICANT CHANGE UP (ref 40–120)
ALT FLD-CCNC: 47 U/L — HIGH (ref 10–45)
ALT FLD-CCNC: 56 U/L — HIGH (ref 10–45)
ANION GAP SERPL CALC-SCNC: 13 MMOL/L — SIGNIFICANT CHANGE UP (ref 5–17)
ANION GAP SERPL CALC-SCNC: 14 MMOL/L — SIGNIFICANT CHANGE UP (ref 5–17)
AST SERPL-CCNC: 32 U/L — SIGNIFICANT CHANGE UP (ref 10–40)
AST SERPL-CCNC: 36 U/L — SIGNIFICANT CHANGE UP (ref 10–40)
BILIRUB SERPL-MCNC: 0.4 MG/DL — SIGNIFICANT CHANGE UP (ref 0.2–1.2)
BILIRUB SERPL-MCNC: 0.5 MG/DL — SIGNIFICANT CHANGE UP (ref 0.2–1.2)
BUN SERPL-MCNC: 12 MG/DL — SIGNIFICANT CHANGE UP (ref 7–23)
BUN SERPL-MCNC: 14 MG/DL — SIGNIFICANT CHANGE UP (ref 7–23)
CALCIUM SERPL-MCNC: 8.2 MG/DL — LOW (ref 8.4–10.5)
CALCIUM SERPL-MCNC: 8.8 MG/DL — SIGNIFICANT CHANGE UP (ref 8.4–10.5)
CHLORIDE SERPL-SCNC: 101 MMOL/L — SIGNIFICANT CHANGE UP (ref 96–108)
CHLORIDE SERPL-SCNC: 101 MMOL/L — SIGNIFICANT CHANGE UP (ref 96–108)
CO2 SERPL-SCNC: 21 MMOL/L — LOW (ref 22–31)
CO2 SERPL-SCNC: 22 MMOL/L — SIGNIFICANT CHANGE UP (ref 22–31)
CREAT SERPL-MCNC: 0.85 MG/DL — SIGNIFICANT CHANGE UP (ref 0.5–1.3)
CREAT SERPL-MCNC: 0.86 MG/DL — SIGNIFICANT CHANGE UP (ref 0.5–1.3)
EGFR: 113 ML/MIN/1.73M2 — SIGNIFICANT CHANGE UP
EGFR: 113 ML/MIN/1.73M2 — SIGNIFICANT CHANGE UP
GLUCOSE BLDC GLUCOMTR-MCNC: 221 MG/DL — HIGH (ref 70–99)
GLUCOSE BLDC GLUCOMTR-MCNC: 237 MG/DL — HIGH (ref 70–99)
GLUCOSE BLDC GLUCOMTR-MCNC: 261 MG/DL — HIGH (ref 70–99)
GLUCOSE BLDC GLUCOMTR-MCNC: 372 MG/DL — HIGH (ref 70–99)
GLUCOSE SERPL-MCNC: 264 MG/DL — HIGH (ref 70–99)
GLUCOSE SERPL-MCNC: 314 MG/DL — HIGH (ref 70–99)
HCT VFR BLD CALC: 39.1 % — SIGNIFICANT CHANGE UP (ref 39–50)
HGB BLD-MCNC: 13.3 G/DL — SIGNIFICANT CHANGE UP (ref 13–17)
MAGNESIUM SERPL-MCNC: 1.8 MG/DL — SIGNIFICANT CHANGE UP (ref 1.6–2.6)
MCHC RBC-ENTMCNC: 30.1 PG — SIGNIFICANT CHANGE UP (ref 27–34)
MCHC RBC-ENTMCNC: 34 GM/DL — SIGNIFICANT CHANGE UP (ref 32–36)
MCV RBC AUTO: 88.5 FL — SIGNIFICANT CHANGE UP (ref 80–100)
MRSA PCR RESULT.: SIGNIFICANT CHANGE UP
MRSA PCR RESULT.: SIGNIFICANT CHANGE UP
NRBC # BLD: 0 /100 WBCS — SIGNIFICANT CHANGE UP (ref 0–0)
PHOSPHATE SERPL-MCNC: 3.7 MG/DL — SIGNIFICANT CHANGE UP (ref 2.5–4.5)
PLATELET # BLD AUTO: 160 K/UL — SIGNIFICANT CHANGE UP (ref 150–400)
POTASSIUM SERPL-MCNC: 3.6 MMOL/L — SIGNIFICANT CHANGE UP (ref 3.5–5.3)
POTASSIUM SERPL-MCNC: 4 MMOL/L — SIGNIFICANT CHANGE UP (ref 3.5–5.3)
POTASSIUM SERPL-SCNC: 3.6 MMOL/L — SIGNIFICANT CHANGE UP (ref 3.5–5.3)
POTASSIUM SERPL-SCNC: 4 MMOL/L — SIGNIFICANT CHANGE UP (ref 3.5–5.3)
PROT SERPL-MCNC: 6.1 G/DL — SIGNIFICANT CHANGE UP (ref 6–8.3)
PROT SERPL-MCNC: 6.6 G/DL — SIGNIFICANT CHANGE UP (ref 6–8.3)
RBC # BLD: 4.42 M/UL — SIGNIFICANT CHANGE UP (ref 4.2–5.8)
RBC # FLD: 12 % — SIGNIFICANT CHANGE UP (ref 10.3–14.5)
S AUREUS DNA NOSE QL NAA+PROBE: DETECTED
S AUREUS DNA NOSE QL NAA+PROBE: DETECTED
SODIUM SERPL-SCNC: 136 MMOL/L — SIGNIFICANT CHANGE UP (ref 135–145)
SODIUM SERPL-SCNC: 136 MMOL/L — SIGNIFICANT CHANGE UP (ref 135–145)
WBC # BLD: 5.41 K/UL — SIGNIFICANT CHANGE UP (ref 3.8–10.5)
WBC # FLD AUTO: 5.41 K/UL — SIGNIFICANT CHANGE UP (ref 3.8–10.5)

## 2023-08-28 PROCEDURE — 99232 SBSQ HOSP IP/OBS MODERATE 35: CPT | Mod: GC

## 2023-08-28 PROCEDURE — 99232 SBSQ HOSP IP/OBS MODERATE 35: CPT

## 2023-08-28 RX ORDER — INSULIN LISPRO 100/ML
4 VIAL (ML) SUBCUTANEOUS
Refills: 0 | Status: DISCONTINUED | OUTPATIENT
Start: 2023-08-28 | End: 2023-08-28

## 2023-08-28 RX ORDER — INSULIN LISPRO 100/ML
10 VIAL (ML) SUBCUTANEOUS
Refills: 0 | Status: DISCONTINUED | OUTPATIENT
Start: 2023-08-28 | End: 2023-08-29

## 2023-08-28 RX ORDER — INSULIN GLARGINE 100 [IU]/ML
60 INJECTION, SOLUTION SUBCUTANEOUS AT BEDTIME
Refills: 0 | Status: DISCONTINUED | OUTPATIENT
Start: 2023-08-28 | End: 2023-08-29

## 2023-08-28 RX ORDER — NICOTINE POLACRILEX 2 MG
1 GUM BUCCAL DAILY
Refills: 0 | Status: DISCONTINUED | OUTPATIENT
Start: 2023-08-28 | End: 2023-08-29

## 2023-08-28 RX ORDER — INSULIN GLARGINE 100 [IU]/ML
66 INJECTION, SOLUTION SUBCUTANEOUS AT BEDTIME
Refills: 0 | Status: DISCONTINUED | OUTPATIENT
Start: 2023-08-28 | End: 2023-08-28

## 2023-08-28 RX ORDER — POTASSIUM CHLORIDE 20 MEQ
40 PACKET (EA) ORAL ONCE
Refills: 0 | Status: DISCONTINUED | OUTPATIENT
Start: 2023-08-28 | End: 2023-08-29

## 2023-08-28 RX ADMIN — Medication 1 PATCH: at 17:37

## 2023-08-28 RX ADMIN — Medication 3: at 14:11

## 2023-08-28 RX ADMIN — Medication 10 UNIT(S): at 17:45

## 2023-08-28 RX ADMIN — Medication 5: at 17:45

## 2023-08-28 RX ADMIN — CHLORHEXIDINE GLUCONATE 1 APPLICATION(S): 213 SOLUTION TOPICAL at 14:12

## 2023-08-28 RX ADMIN — Medication 2: at 09:24

## 2023-08-28 RX ADMIN — Medication 3 UNIT(S): at 09:23

## 2023-08-28 RX ADMIN — PIPERACILLIN AND TAZOBACTAM 25 GRAM(S): 4; .5 INJECTION, POWDER, LYOPHILIZED, FOR SOLUTION INTRAVENOUS at 22:32

## 2023-08-28 RX ADMIN — INSULIN GLARGINE 60 UNIT(S): 100 INJECTION, SOLUTION SUBCUTANEOUS at 22:27

## 2023-08-28 RX ADMIN — Medication 1 PATCH: at 19:33

## 2023-08-28 RX ADMIN — PIPERACILLIN AND TAZOBACTAM 25 GRAM(S): 4; .5 INJECTION, POWDER, LYOPHILIZED, FOR SOLUTION INTRAVENOUS at 14:12

## 2023-08-28 RX ADMIN — PIPERACILLIN AND TAZOBACTAM 25 GRAM(S): 4; .5 INJECTION, POWDER, LYOPHILIZED, FOR SOLUTION INTRAVENOUS at 05:44

## 2023-08-28 RX ADMIN — Medication 275 MICROGRAM(S): at 05:45

## 2023-08-28 RX ADMIN — Medication 4 UNIT(S): at 14:17

## 2023-08-28 RX ADMIN — Medication 200 MILLIGRAM(S): at 02:46

## 2023-08-28 RX ADMIN — Medication 200 MILLIGRAM(S): at 17:38

## 2023-08-28 RX ADMIN — ATORVASTATIN CALCIUM 20 MILLIGRAM(S): 80 TABLET, FILM COATED ORAL at 22:32

## 2023-08-28 NOTE — PROGRESS NOTE ADULT - PROBLEM SELECTOR PLAN 6
On losartan-HCTZ at home  - hold in setting of sepsis
On losartan-HCTZ at home  - hold in setting of sepsis

## 2023-08-28 NOTE — DIETITIAN INITIAL EVALUATION ADULT - PERTINENT LABORATORY DATA
08-28    136  |  101  |  12  ----------------------------<  264<H>  3.6   |  21<L>  |  0.86    Ca    8.2<L>      28 Aug 2023 07:03  Phos  3.7     08-28  Mg     1.8     08-28    TPro  6.1  /  Alb  3.6  /  TBili  0.4  /  DBili  x   /  AST  32  /  ALT  47<H>  /  AlkPhos  46  08-28  POCT Blood Glucose.: 221 mg/dL (08-28-23 @ 09:06)  A1C with Estimated Average Glucose Result: 10.6 % (08-27-23 @ 07:31)

## 2023-08-28 NOTE — DIETITIAN INITIAL EVALUATION ADULT - REASON FOR ADMISSION
Cellulitis.  Per chart, "38yo M with PMH DM2 (A1c 8.8 6/2023), HTN, HLD, thyroid cancer s/p resection, numerous back abscesses presenting with L thigh cellulitis and abscess and found to be septic. S/p I&D by surgery."

## 2023-08-28 NOTE — DIETITIAN INITIAL EVALUATION ADULT - PROBLEM SELECTOR PLAN 4
BG 400s on presentation to ED without acidosis, BG now downtrending   - s/p 10u humulin in ED  - on ozempic and basaglar 68u at home, last A1c 8.8 6/2023  - start 75% of basal insulin, lantus 51u and start 3u admelog; titrate up as needed   - ISS, monitor BG, CC diet

## 2023-08-28 NOTE — DIETITIAN INITIAL EVALUATION ADULT - ENERGY INTAKE
Pt reported improved appetite and PO intake in-house. Pt stated he feels hungry again and has been eating all 3 meals. Currently, pt stated he is consuming approximately 75% of his trays in-house. Pt declined oral nutrition supplements, and stated he did not need a menu.  Adequate (%)

## 2023-08-28 NOTE — DIETITIAN INITIAL EVALUATION ADULT - PERSON TAUGHT/METHOD
Provided education on Nutrition Therapy for Type 2 Diabetes. Emphasis on what foods contain carbohydrates, importance of pairing carbohydrates with protein for glycemic control; choosing whole grains vs refined carbohydrates; limiting refined sugars, portion sizes. Emphasized the importance of blood glucose management for wound healing. Pt made aware RD to remain available for any questions./verbal instruction/written material/patient instructed

## 2023-08-28 NOTE — DIETITIAN INITIAL EVALUATION ADULT - NSFNSPHYEXAMSKINFT_GEN_A_CORE
No pressure injuries documented (per flow sheet).   Surgical Incision: Left:; upper; thigh (per flow sheets).

## 2023-08-28 NOTE — PROGRESS NOTE ADULT - SUBJECTIVE AND OBJECTIVE BOX
SURGERY PROGRESS NOTE    SUBJECTIVE / 24H EVENTS:  Patient seen and examined on morning rounds. No acute events overnight. Patient reports pain improved      OBJECTIVE:  VITAL SIGNS:  T(C): 36.7 (08-28-23 @ 13:34), Max: 36.9 (08-27-23 @ 21:07)  HR: 86 (08-28-23 @ 13:34) (77 - 86)  BP: 119/78 (08-28-23 @ 13:34) (115/74 - 123/68)  RR: 18 (08-28-23 @ 13:34) (18 - 20)  SpO2: 96% (08-28-23 @ 13:34) (95% - 98%)  Daily     Daily   POCT Blood Glucose.: 261 mg/dL (08-28-23 @ 13:20)  POCT Blood Glucose.: 221 mg/dL (08-28-23 @ 09:06)  POCT Blood Glucose.: 341 mg/dL (08-27-23 @ 21:44)      PHYSICAL EXAM:  Gen: NAD  LS: Respirations unlabored on RA  Ext: LLE with improved erythema and improving induration. Packing removed at bedside    08-27-23 @ 07:01  -  08-28-23 @ 07:00  --------------------------------------------------------  IN:    Lactated Ringers: 825 mL    Oral Fluid: 1380 mL  Total IN: 2205 mL    OUT:    Voided (mL): 1700 mL  Total OUT: 1700 mL    Total NET: 505 mL          LAB VALUES:  08-28    136  |  101  |  12  ----------------------------<  264<H>  3.6   |  21<L>  |  0.86    Ca    8.2<L>      28 Aug 2023 07:03  Phos  3.7     08-28  Mg     1.8     08-28    TPro  6.1  /  Alb  3.6  /  TBili  0.4  /  DBili  x   /  AST  32  /  ALT  47<H>  /  AlkPhos  46  08-28                               13.3   5.41  )-----------( 160      ( 28 Aug 2023 07:01 )             39.1     LIVER FUNCTIONS - ( 28 Aug 2023 07:03 )  Alb: 3.6 g/dL / Pro: 6.1 g/dL / ALK PHOS: 46 U/L / ALT: 47 U/L / AST: 32 U/L / GGT: x           PT/INR - ( 26 Aug 2023 16:08 )   PT: 10.5 sec;   INR: 1.00 ratio         PTT - ( 26 Aug 2023 16:08 )  PTT:27.3 sec    CARDIAC MARKERS ( 27 Aug 2023 07:31 )  x     / x     / 64 U/L / x     / x          Urinalysis Basic - ( 28 Aug 2023 07:03 )    Color: x / Appearance: x / SG: x / pH: x  Gluc: 264 mg/dL / Ketone: x  / Bili: x / Urobili: x   Blood: x / Protein: x / Nitrite: x   Leuk Esterase: x / RBC: x / WBC x   Sq Epi: x / Non Sq Epi: x / Bacteria: x        MICROBIOLOGY:    Culture - Abscess with Gram Stain (collected 26 Aug 2023 21:08)  Source: .Abscess wound  Gram Stain (27 Aug 2023 08:10):    Few polymorphonuclear leukocytes seen per low power field    Rare Gram positive cocci in pairs seen per oil power field    Rare Gram Negative Rods seen per oil power field  Preliminary Report (28 Aug 2023 07:35):    No growth to date.    Culture - Urine (collected 26 Aug 2023 16:25)  Source: Clean Catch Clean Catch (Midstream)  Final Report (27 Aug 2023 22:46):    No growth    Culture - Blood (collected 26 Aug 2023 15:52)  Source: .Blood Blood-Peripheral  Preliminary Report (27 Aug 2023 22:03):    No growth at 24 hours    Culture - Blood (collected 26 Aug 2023 15:36)  Source: .Blood Blood-Peripheral  Preliminary Report (27 Aug 2023 22:03):    No growth at 24 hours        RADIOLOGY:        MEDICATIONS  (STANDING):  atorvastatin 20 milliGRAM(s) Oral at bedtime  chlorhexidine 4% Liquid 1 Application(s) Topical daily  dextrose 5%. 1000 milliLiter(s) (100 mL/Hr) IV Continuous <Continuous>  dextrose 5%. 1000 milliLiter(s) (50 mL/Hr) IV Continuous <Continuous>  dextrose 50% Injectable 25 Gram(s) IV Push once  dextrose 50% Injectable 12.5 Gram(s) IV Push once  dextrose 50% Injectable 25 Gram(s) IV Push once  glucagon  Injectable 1 milliGRAM(s) IntraMuscular once  insulin glargine Injectable (LANTUS) 60 Unit(s) SubCutaneous at bedtime  insulin lispro (ADMELOG) corrective regimen sliding scale   SubCutaneous three times a day before meals  insulin lispro (ADMELOG) corrective regimen sliding scale   SubCutaneous at bedtime  insulin lispro Injectable (ADMELOG) 10 Unit(s) SubCutaneous three times a day before meals  lactated ringers. 1000 milliLiter(s) (75 mL/Hr) IV Continuous <Continuous>  levothyroxine 275 MICROGram(s) Oral daily  multivitamin 1 Tablet(s) Oral daily  piperacillin/tazobactam IVPB.. 3.375 Gram(s) IV Intermittent every 8 hours  potassium chloride   Powder 40 milliEquivalent(s) Oral once  vancomycin  IVPB 1500 milliGRAM(s) IV Intermittent every 12 hours    MEDICATIONS  (PRN):  acetaminophen     Tablet .. 650 milliGRAM(s) Oral every 6 hours PRN Temp greater or equal to 38C (100.4F), Mild Pain (1 - 3)  dextrose Oral Gel 15 Gram(s) Oral once PRN Blood Glucose LESS THAN 70 milliGRAM(s)/deciliter  melatonin 3 milliGRAM(s) Oral at bedtime PRN Insomnia

## 2023-08-28 NOTE — DISCHARGE NOTE PROVIDER - NSDCMRMEDTOKEN_GEN_ALL_CORE_FT
atorvastatin 20 mg oral tablet: 1 tab(s) orally once a day  Basaglar KwikPen 100 units/mL subcutaneous solution: 68 unit(s) subcutaneous once a day (at bedtime)  levothyroxine 50 mcg (0.05 mg) oral tablet: 5.5 tablet with sensor orally once a day  losartan-hydrochlorothiazide 50 mg-12.5 mg oral tablet: 1 tab(s) orally once a day  Ozempic 2 mg/1.5 mL (1 mg dose) subcutaneous solution: 1 milligram(s) subcutaneously once a week   amoxicillin-clavulanate 875 mg-125 mg oral tablet: 1 tab(s) orally 2 times a day  atorvastatin 40 mg oral tablet: 1 tab(s) orally once a day (at bedtime)  insulin glargine 100 units/mL subcutaneous solution: 60 unit(s) subcutaneous once a day (at bedtime)  levothyroxine 25 mcg (0.025 mg) oral tablet: 11 tab(s) orally once a day  losartan-hydrochlorothiazide 50 mg-12.5 mg oral tablet: 1 tab(s) orally once a day  Ozempic 2 mg/1.5 mL (1 mg dose) subcutaneous solution: 1 milligram(s) subcutaneously once a week   amoxicillin-clavulanate 875 mg-125 mg oral tablet: 1 tab(s) orally 2 times a day  atorvastatin 40 mg oral tablet: 1 tab(s) orally once a day (at bedtime)  Basaglar KwikPen 100 units/mL subcutaneous solution: 60 unit(s) subcutaneous once a day (at bedtime)  levothyroxine 25 mcg (0.025 mg) oral tablet: 11 tab(s) orally once a day  losartan-hydrochlorothiazide 50 mg-12.5 mg oral tablet: 1 tab(s) orally once a day  Ozempic 2 mg/1.5 mL (1 mg dose) subcutaneous solution: 1 milligram(s) subcutaneously once a week

## 2023-08-28 NOTE — DISCHARGE NOTE PROVIDER - NSDCCPCAREPLAN_GEN_ALL_CORE_FT
PRINCIPAL DISCHARGE DIAGNOSIS  Diagnosis: Cellulitis  Assessment and Plan of Treatment: You came to the hospital because you had noticed redness and swelling of your inner thigh. You were found to have an abscess and cellulitis, infection of the skin. Surgery evaluated you and drained the abscess.  You received IV antibiotics and your symptoms improved.     PRINCIPAL DISCHARGE DIAGNOSIS  Diagnosis: Cellulitis  Assessment and Plan of Treatment: You came to the hospital because you had noticed redness and swelling of your inner thigh. You were found to have an abscess and cellulitis, infection of the skin. Surgery evaluated you and drained the abscess.  You received IV antibiotics and your symptoms improved.      SECONDARY DISCHARGE DIAGNOSES  Diagnosis: Type 2 diabetes mellitus  Assessment and Plan of Treatment: Patient has an increased A1C of 10.6 from 8.4 in 6/2023. Patient stated that he has had difficulty accessing medications. Patient should follow up with endocrine, optho, podiatry and their pcp after discharge     PRINCIPAL DISCHARGE DIAGNOSIS  Diagnosis: Cellulitis  Assessment and Plan of Treatment: You came to the hospital because you had noticed redness and swelling of your inner thigh. You were found to have an abscess and cellulitis, infection of the skin. Surgery evaluated you and drained the abscess.  You received IV antibiotics and your symptoms improved. Please follow up with surgery outpatient and continue the course of antibiotics.      SECONDARY DISCHARGE DIAGNOSES  Diagnosis: Type 2 diabetes mellitus  Assessment and Plan of Treatment: You had an increased A1C of 10.6 from 8.4 in 6/2023. You stated that he has had difficulty accessing medications. We have sent insulin scripts to your pharmacy. Please follow up with endocrine, optho, podiatry and your primary provider after discharge.

## 2023-08-28 NOTE — DISCHARGE NOTE PROVIDER - NSFOLLOWUPCLINICSTOKEN_GEN_ALL_ED_FT
034172:1 month|| ||00\01||False;059261:2 weeks|| ||00\01||False;057029:2 weeks|| ||00\01||False;151453:2 weeks|| ||00\01||False;

## 2023-08-28 NOTE — DIETITIAN INITIAL EVALUATION ADULT - NS FNS WEIGHT CHANGE REASON
Pt states his UBW prior to admission is around 330-340 pounds, question accuracy. Per Denise MCFARLANE, pt has lost 4.4% of his weight x 2 months, not clinically significant.

## 2023-08-28 NOTE — DISCHARGE NOTE PROVIDER - CARE PROVIDER_API CALL
[facility]  Surgery  [address] [place] [id number]  Phone: [phone]  Fax: [phone]  Follow Up Time: 2 weeks

## 2023-08-28 NOTE — DISCHARGE NOTE PROVIDER - NSDCCPTREATMENT_GEN_ALL_CORE_FT
PRINCIPAL PROCEDURE  Procedure: CT scan  Findings and Treatment: Medial left thigh cellulitis with a couple areas of more focal skin   thickening including at the anteromedial upper thigh without abscess and   more inferiorly in the medial thigh inclusive of a 3.2 cm intradermal   collection, compatible with abscess/superinfected cyst.  No CT evidence of acute intra-abdominal pathology.

## 2023-08-28 NOTE — DIETITIAN INITIAL EVALUATION ADULT - NSFNSADHERENCEPTAFT_GEN_A_CORE
Prior to admission, pt states he follows a regular diet at home. Pt mentioned that he has received diabetes education in the past, and is aware of what foods to limit to control blood glucose levels. Pt states he used to wear a Freestyle Craig 14 Day Sensor at home, however he ran out and has been waiting for a new monitor. Pt also states he uses a Basaglar Pen at home at night before bed and takes Ozempic 1x a week. Pt stated he has not been using his Basaglar Pen for about a month now due to insurance issues and lack of stock in his local pharmacy.

## 2023-08-28 NOTE — DIETITIAN INITIAL EVALUATION ADULT - ADD RECOMMEND
1) Recommend continue current diet as tolerated: Consistent Carbohydrate (No Snack).  2) RD to add double protein at breakfast to assist with meeting estimated protein needs.   3) Recommend adding multivitamin daily for micronutrient coverage.  4) Monitor adherence to nutrition related recommendations.   5) BMI alert placed in chart.  1) Recommend continue current diet as tolerated: Consistent Carbohydrate (No Snack).  2) RD to add double protein at breakfast to assist with meeting estimated protein needs.   3) Recommend adding multivitamin daily for micronutrient coverage.  4) Monitor adherence to nutrition related recommendations.   5) Monitor nutritional intake, tolerance to diet prescription, weights, labs, and skin integrity.   6) BMI alert placed in chart.

## 2023-08-28 NOTE — PROGRESS NOTE ADULT - PROBLEM SELECTOR PLAN 7
DVT: encourage ambulation  Diet: CC  Dispo: pending improvement DVT: encourage ambulation  Diet: CC  Dispo: pending improvement and endo recs

## 2023-08-28 NOTE — DIETITIAN INITIAL EVALUATION ADULT - ETIOLOGY
related to lack of prior exposure to T2DM nutrition therapy related to suspected energy intake exceeding energy expenditure

## 2023-08-28 NOTE — PROGRESS NOTE ADULT - SUBJECTIVE AND OBJECTIVE BOX
Follow Up:  abscess    Interval History/ROS: better,  no more discomfort    Allergies  No Known Allergies    ANTIMICROBIALS:  piperacillin/tazobactam IVPB.. 3.375 every 8 hours  vancomycin  IVPB 1500 every 12 hours      OTHER MEDS:  MEDICATIONS  (STANDING):  acetaminophen     Tablet .. 650 every 6 hours PRN  atorvastatin 20 at bedtime  dextrose 50% Injectable 25 once  dextrose 50% Injectable 12.5 once  dextrose 50% Injectable 25 once  dextrose Oral Gel 15 once PRN  glucagon  Injectable 1 once  insulin glargine Injectable (LANTUS) 60 at bedtime  insulin lispro (ADMELOG) corrective regimen sliding scale  three times a day before meals  insulin lispro (ADMELOG) corrective regimen sliding scale  at bedtime  insulin lispro Injectable (ADMELOG) 10 three times a day before meals  levothyroxine 275 daily  melatonin 3 at bedtime PRN      Vital Signs Last 24 Hrs  T(C): 36.7 (28 Aug 2023 13:34), Max: 36.9 (27 Aug 2023 21:07)  T(F): 98 (28 Aug 2023 13:34), Max: 98.4 (27 Aug 2023 21:07)  HR: 86 (28 Aug 2023 13:34) (77 - 86)  BP: 119/78 (28 Aug 2023 13:34) (115/74 - 123/68)  BP(mean): --  RR: 18 (28 Aug 2023 13:34) (18 - 20)  SpO2: 96% (28 Aug 2023 13:34) (95% - 98%)    Parameters below as of 28 Aug 2023 13:34  Patient On (Oxygen Delivery Method): room air        PHYSICAL EXAM:  General: WN/WD NAD, Non-toxic  Neurology: A&Ox3, nonfocal  Respiratory: Clear to auscultation bilaterally  CV: RRR, S1S2, no murmurs, rubs or gallops  Abdominal: Soft, Non-tender, non-distended  Extremities: No edema,  decreased thigh swelling dressing clean and dry  Line Sites: Clear  Skin: No rash                        13.3   5.41  )-----------( 160      ( 28 Aug 2023 07:01 )             39.1     08-28    136  |  101  |  12  ----------------------------<  264<H>  3.6   |  21<L>  |  0.86    Ca    8.2<L>      28 Aug 2023 07:03  Phos  3.7     08-28  Mg     1.8     08-28    TPro  6.1  /  Alb  3.6  /  TBili  0.4  /  DBili  x   /  AST  32  /  ALT  47<H>  /  AlkPhos  46  08-28      Urinalysis Basic - ( 28 Aug 2023 07:03 )    Color: x / Appearance: x / SG: x / pH: x  Gluc: 264 mg/dL / Ketone: x  / Bili: x / Urobili: x   Blood: x / Protein: x / Nitrite: x   Leuk Esterase: x / RBC: x / WBC x   Sq Epi: x / Non Sq Epi: x / Bacteria: x    (08.28.23 @ 07:07)  MRSA PCR Result.: NotDete:  MICROBIOLOGY:  .Abscess wound  08-26-23   No growth to date.  --    Few polymorphonuclear leukocytes seen per low power field  Rare Gram positive cocci in pairs seen per oil power field  Rare Gram Negative Rods seen per oil power field    Clean Catch Clean Catch (Midstream)  08-26-23   No growth  --  --      .Blood Blood-Peripheral  08-26-23   No growth at 24 hours  --  --      .Blood Blood-Peripheral  08-26-23   No growth at 24 hours  --  --      RADIOLOGY:    Camilo Sneed MD; Division of Infectious Disease; Pager: [phone]; nights and weekends: [phone]

## 2023-08-28 NOTE — PROGRESS NOTE ADULT - PROBLEM SELECTOR PLAN 1
T100.5, , with cellulitis and abscess  - fever, vital signs and WBC now resolved.  -CT with medial left thigh cellulitis and medial thigh inclusive of a 3.2 cm intradermal collection inferiorly, compatible with abscess/superinfected cyst. Erythema has regressed. Area is now non erythematous and non tender.  s/p vanc, zosyn, and clinda in ED   s/p I&D of abscess with surgery    IV vanc dosage exceeded acceptable dosing limit given pt's BMI and he had an anaphylactoid reaction in the ED, patient turned red and complained of flushing so started Daptomycin 4mg/kg,  - per ID recs, slower rate of infusion for vancomycin ordered  - f/u final abscess culture - no growth at 24 hours  -blood cultures - no growth at 24 hours  - urine cultures negative T100.5, , with cellulitis and abscess  - fever, vital signs and WBC now resolved.  -CT with medial left thigh cellulitis and medial thigh inclusive of a 3.2 cm intradermal collection inferiorly, compatible with abscess/superinfected cyst. Erythema has regressed. Area is now non erythematous and non tender.  s/p vanc, zosyn, and clinda in ED   s/p I&D of abscess with surgery    IV vanc dosage exceeded acceptable dosing limit given pt's BMI and he had an anaphylactoid reaction in the ED, patient turned red and complained of flushing so started Daptomycin 4mg/kg,  - per ID recs, slower rate of infusion for vancomycin ordered  - day 4 of hospitalization Vancomycin/Zosyn was transitioned to PO Augmentin  - f/u final abscess culture - no growth at 48 hours  -blood cultures - no growth at 48  hours  - urine cultures negative

## 2023-08-28 NOTE — PROGRESS NOTE ADULT - ASSESSMENT
37M with DM (8/27/23: A1c=10.6 ), HTN, HLD, thyroid cancer s/p resection obesity (BMI = 45.1)  was admitted 8/26/23 with worsening L thigh redness for 3-4 days, found to be febrile 100.5F, no leukocytosis, CT showing L thigh cellulitis and 3.2cm collection concerning for abscess, s/p I&D with prelim GS showing GPC/GNR, s/p vancomycin in the ED, and developed redness and flushing, switched antibiotic to daptomycin, ID consulted for assistance.    Pt reports 3-4 days ago he noticed a pimple-like lesion on his L thigh that grew bigger and worsened in the last 2 days  He notes his diabetic medications has been in short supply  - has had difficulty with Ozempic and delayed delivery of Balsaglar insulin  Denies any scratches or bug bites.   Does have a pet dog but denies any fleas or ticks around dog.  Works    Admits tobacco use, Denies alcohol, recreational drug use    Antibiotics  Vancomycin 8/26; 8/27 -->  Bactrim 8/27   zosyn 8/26  Clinda 8/26  DAPTOmycin IVPB 640 every 24 hours (8/27 --> )    DIAGNOSIS and IMPRESSION:  L Thigh Abscess  with reactive cellulitis  8/26 I&D:  G+C, GNRs   Vanco Valentín Syndrome  poorly controlled DM  (8/27 A1C = 10.6  Obesity  BMI = 45.1  8/28 MRSA PCR NEG    improved    RECOMMENDATIONS:  If sustained improvement, can discharge in am on Augmentin 875mg twice daily through 9/5    discussed with primary medical team earlier today

## 2023-08-28 NOTE — PROGRESS NOTE ADULT - ASSESSMENT
38yo M with PMH DM2 (A1c 8.8 6/2023), HTN, HLD, thyroid cancer s/p resection, numerous back abscesses presenting with L thigh cellulitis and abscess and found to be septic. S/p I&D by surgery  36yo M with PMH DM2 (A1c 8.8 6/2023), HTN, HLD, thyroid cancer s/p resection, numerous back abscesses presenting with L thigh cellulitis and abscess and found to be septic. S/p I&D by surgery. course complicated by anaphylactoid type reaction from vancomycin. Patient was restarted on vancomycin at a slower rate per recs of ID.

## 2023-08-28 NOTE — DISCHARGE NOTE PROVIDER - ATTENDING ATTESTATION STATEMENT
chart(s) I have personally seen and examined the patient. I have collaborated with and supervised the

## 2023-08-28 NOTE — DIETITIAN INITIAL EVALUATION ADULT - NUTRITIONGOAL OUTCOME1
Return to the clinic in 3 month/s.  Will contact with results as needed.\   Pt will be able to provide 2 teach back points.

## 2023-08-28 NOTE — PROGRESS NOTE ADULT - SUBJECTIVE AND OBJECTIVE BOX
----- INTERVAL HPI/OVERNIGHT EVENTS -----     Patient was seen and examined at bedside. As per nurse and patient, no o/n events, patient resting comfortably. No complaints at this time. Patient denies: fever, chills, dizziness, weakness, HA, Changes in vision, CP, palpitations, SOB, cough, N/V/D/C, dysuria, changes in bowel movements, LE edema. ROS otherwise negative.      Subjective: Patient is a 37y old  Male who presents with a chief complaint of Cellulitis.  Per chart, 38yo M with PMH DM2 (A1c 8.8 6/2023), HTN, HLD, thyroid cancer s/p resection, numerous back abscesses presenting with L thigh cellulitis and abscess and found to be septic. S/p I&D by surgery.      ----- VITAL SIGNS -----  T(F): 98 (08-28-23 @ 13:34)  HR: 86 (08-28-23 @ 13:34)  BP: 119/78 (08-28-23 @ 13:34)  RR: 18 (08-28-23 @ 13:34)  SpO2: 96% (08-28-23 @ 13:34)  Wt(kg): --      08-27-23 @ 07:01  -  08-28-23 @ 07:00  --------------------------------------------------------  IN: 2205 mL / OUT: 1700 mL / NET: 505 mL        ----- Physical Exam -----     Constitutional: resting comfortably in bed; NAD  HEENT: NC/AT,MMM;   Respiratory: CTA B/L; no W/R/R, no retractions  Cardiac: +S1/S2; RRR; no M/R/G appreciated  Vascular: 2+ distal pedal pulses B/L  Dermatologic: skin warm, dry and intact; Abscess is covered with gauze, erythematous border demarcated from ED shows erythema regression by 10cm  Neurologic: AAOx3;   Psychiatric: affect and characteristics of appearance, verbalizations, behaviors are appropriate    MEDICATIONS  (STANDING):  atorvastatin 20 milliGRAM(s) Oral at bedtime  chlorhexidine 4% Liquid 1 Application(s) Topical daily  dextrose 5%. 1000 milliLiter(s) (100 mL/Hr) IV Continuous <Continuous>  dextrose 5%. 1000 milliLiter(s) (50 mL/Hr) IV Continuous <Continuous>  dextrose 50% Injectable 25 Gram(s) IV Push once  dextrose 50% Injectable 12.5 Gram(s) IV Push once  dextrose 50% Injectable 25 Gram(s) IV Push once  glucagon  Injectable 1 milliGRAM(s) IntraMuscular once  insulin glargine Injectable (LANTUS) 60 Unit(s) SubCutaneous at bedtime  insulin lispro (ADMELOG) corrective regimen sliding scale   SubCutaneous three times a day before meals  insulin lispro (ADMELOG) corrective regimen sliding scale   SubCutaneous at bedtime  insulin lispro Injectable (ADMELOG) 10 Unit(s) SubCutaneous three times a day before meals  lactated ringers. 1000 milliLiter(s) (75 mL/Hr) IV Continuous <Continuous>  levothyroxine 275 MICROGram(s) Oral daily  multivitamin 1 Tablet(s) Oral daily  piperacillin/tazobactam IVPB.. 3.375 Gram(s) IV Intermittent every 8 hours  potassium chloride   Powder 40 milliEquivalent(s) Oral once  vancomycin  IVPB 1500 milliGRAM(s) IV Intermittent every 12 hours    MEDICATIONS  (PRN):  acetaminophen     Tablet .. 650 milliGRAM(s) Oral every 6 hours PRN Temp greater or equal to 38C (100.4F), Mild Pain (1 - 3)  dextrose Oral Gel 15 Gram(s) Oral once PRN Blood Glucose LESS THAN 70 milliGRAM(s)/deciliter  melatonin 3 milliGRAM(s) Oral at bedtime PRN Insomnia      Allergies    No Known Allergies    Intolerances        ----- LABS -----                         13.3   5.41  )-----------( 160      ( 28 Aug 2023 07:01 )             39.1     08-28    136  |  101  |  12  ----------------------------<  264<H>  3.6   |  21<L>  |  0.86    Ca    8.2<L>      28 Aug 2023 07:03  Phos  3.7     08-28  Mg     1.8     08-28    TPro  6.1  /  Alb  3.6  /  TBili  0.4  /  DBili  x   /  AST  32  /  ALT  47<H>  /  AlkPhos  46  08-28    PT/INR - ( 26 Aug 2023 16:08 )   PT: 10.5 sec;   INR: 1.00 ratio         PTT - ( 26 Aug 2023 16:08 )  PTT:27.3 sec  Urinalysis Basic - ( 28 Aug 2023 07:03 )    Color: x / Appearance: x / SG: x / pH: x  Gluc: 264 mg/dL / Ketone: x  / Bili: x / Urobili: x   Blood: x / Protein: x / Nitrite: x   Leuk Esterase: x / RBC: x / WBC x   Sq Epi: x / Non Sq Epi: x / Bacteria: x          ----- RADIOLOGY & ADDITIONAL TESTS -----     Reviewed ----- INTERVAL HPI/OVERNIGHT EVENTS -----       Subjective: Patient is a 37y old  Male who presents with a chief complaint of Cellulitis.  Per chart, 38yo M with PMH DM2 (A1c 8.8 6/2023), HTN, HLD, thyroid cancer s/p resection, numerous back abscesses presenting with L thigh cellulitis and abscess and found to be septic. S/p I&D by surgery.     Patient was seen and examined at bedside. As per nurse and patient, no o/n events, patient resting comfortably. No complaints at this time. Patient denies: fever, chills, dizziness, weakness, HA, CP,  SOB, cough, N/V/D/C, dysuria, ROS otherwise negative.    ----- VITAL SIGNS -----  T(F): 98 (08-28-23 @ 13:34)  HR: 86 (08-28-23 @ 13:34)  BP: 119/78 (08-28-23 @ 13:34)  RR: 18 (08-28-23 @ 13:34)  SpO2: 96% (08-28-23 @ 13:34)  Wt(kg): --      08-27-23 @ 07:01  -  08-28-23 @ 07:00  --------------------------------------------------------  IN: 2205 mL / OUT: 1700 mL / NET: 505 mL        ----- Physical Exam -----     Constitutional: resting comfortably in bed; NAD  HEENT: NC/AT,MMM;   Respiratory: CTA B/L; no W/R/R, no retractions  Cardiac: +S1/S2; RRR; no M/R/G appreciated  Vascular: 2+ distal pedal pulses B/L  Dermatologic: skin warm, dry and intact; Abscess is covered with gauze, erythematous border demarcated from ED shows erythema regression by 10cm. Non tender  Neurologic: AAOx3;   Psychiatric: affect and characteristics of appearance, verbalizations, behaviors are appropriate    MEDICATIONS  (STANDING):  atorvastatin 20 milliGRAM(s) Oral at bedtime  chlorhexidine 4% Liquid 1 Application(s) Topical daily  dextrose 5%. 1000 milliLiter(s) (100 mL/Hr) IV Continuous <Continuous>  dextrose 5%. 1000 milliLiter(s) (50 mL/Hr) IV Continuous <Continuous>  dextrose 50% Injectable 25 Gram(s) IV Push once  dextrose 50% Injectable 12.5 Gram(s) IV Push once  dextrose 50% Injectable 25 Gram(s) IV Push once  glucagon  Injectable 1 milliGRAM(s) IntraMuscular once  insulin glargine Injectable (LANTUS) 60 Unit(s) SubCutaneous at bedtime  insulin lispro (ADMELOG) corrective regimen sliding scale   SubCutaneous three times a day before meals  insulin lispro (ADMELOG) corrective regimen sliding scale   SubCutaneous at bedtime  insulin lispro Injectable (ADMELOG) 10 Unit(s) SubCutaneous three times a day before meals  lactated ringers. 1000 milliLiter(s) (75 mL/Hr) IV Continuous <Continuous>  levothyroxine 275 MICROGram(s) Oral daily  multivitamin 1 Tablet(s) Oral daily  piperacillin/tazobactam IVPB.. 3.375 Gram(s) IV Intermittent every 8 hours  potassium chloride   Powder 40 milliEquivalent(s) Oral once  vancomycin  IVPB 1500 milliGRAM(s) IV Intermittent every 12 hours    MEDICATIONS  (PRN):  acetaminophen     Tablet .. 650 milliGRAM(s) Oral every 6 hours PRN Temp greater or equal to 38C (100.4F), Mild Pain (1 - 3)  dextrose Oral Gel 15 Gram(s) Oral once PRN Blood Glucose LESS THAN 70 milliGRAM(s)/deciliter  melatonin 3 milliGRAM(s) Oral at bedtime PRN Insomnia      Allergies    No Known Allergies    Intolerances        ----- LABS -----                         13.3   5.41  )-----------( 160      ( 28 Aug 2023 07:01 )             39.1     08-28    136  |  101  |  12  ----------------------------<  264<H>  3.6   |  21<L>  |  0.86    Ca    8.2<L>      28 Aug 2023 07:03  Phos  3.7     08-28  Mg     1.8     08-28    TPro  6.1  /  Alb  3.6  /  TBili  0.4  /  DBili  x   /  AST  32  /  ALT  47<H>  /  AlkPhos  46  08-28    PT/INR - ( 26 Aug 2023 16:08 )   PT: 10.5 sec;   INR: 1.00 ratio         PTT - ( 26 Aug 2023 16:08 )  PTT:27.3 sec  Urinalysis Basic - ( 28 Aug 2023 07:03 )    Color: x / Appearance: x / SG: x / pH: x  Gluc: 264 mg/dL / Ketone: x  / Bili: x / Urobili: x   Blood: x / Protein: x / Nitrite: x   Leuk Esterase: x / RBC: x / WBC x   Sq Epi: x / Non Sq Epi: x / Bacteria: x          ----- RADIOLOGY & ADDITIONAL TESTS -----     Reviewed

## 2023-08-28 NOTE — DIETITIAN INITIAL EVALUATION ADULT - REASON
Nutrition focused physical exam deferred at this time as pt is eating well and has not experienced any significant recent weight changes.

## 2023-08-28 NOTE — DIETITIAN INITIAL EVALUATION ADULT - REASON INDICATOR FOR ASSESSMENT
RD consult warranted for MST Score 2 or >.   Source: Patient, Electronic Medical Record  Chart reviewed, events noted.  RD consult warranted for Education and MST Score 2 or >.   Source: Patient, Electronic Medical Record  Chart reviewed, events noted.

## 2023-08-28 NOTE — DIETITIAN INITIAL EVALUATION ADULT - PROBLEM SELECTOR PLAN 1
T100.5, , with cellulitis and abscess  - CT with medial left thigh cellulitis and medial thigh inclusive of a 3.2 cm intradermal collection inferiorly, compatible with abscess/superinfected cyst.  - s/p vanc, zosyn, and clinda in ED   - s/p I&D of abscess with surgery   - IV vanc dosage exceeds acceptable dosing limit given pt's BMI and he had an allergic reaction in the ED, so start Daptomycin 4mg/kg   - f/u abscess and blood cultures

## 2023-08-28 NOTE — DISCHARGE NOTE PROVIDER - NSDCFUADDINST_GEN_ALL_CORE_FT
Wound care: Cover groin wounds with gauze and paper tape as needed. May shower. Allow water and soap to run over the area but do not scrub. No submerging in water (baths, swimming, etc.) for 4 weeks.

## 2023-08-28 NOTE — DIETITIAN INITIAL EVALUATION ADULT - OTHER INFO
Weights:  - UBW (per patient): 330-340 pounds (2023)  - Dosing Weight (per chart): 354.5 pounds (standing) (08-26)  - Daily Weights: No daily weights documented in flow sheets (per chart).   - Per Denise HISHRUTHI:   RD to continue to monitor weights and trends as able.  Weights:  - UBW (per patient): 330-340 pounds (2023) (question accuracy)  - Dosing Weight (per chart): 354.5 pounds (standing) (08-26) (Used for BMI)  - Daily Weights: No daily weights documented in flow sheets (per chart).   - Per [facility] HIE: 300.1 pounds (08-26), 371 pounds (06-13), 373 pounds (01-24)  Noted 4.4% weight loss in 2 months, not clinically significant. RD to continue to monitor weights and trends as available/able.     Pt with T2DM (per chart).  - A1C 10.6% (08-27) (per chart).  - Estimated average glucose 258 mg/dL (08-27).   - Ordered for an insulin regiment in-house: Insulin Glargine, Insulin Lispro, Insulin Lispro (Admelog) Corrective Regimen Sliding Scale (per orders).  - Note: Consult ordered for social work to assist pt with obtaining DM medication.     Pt with sepsis, cellulitis and abscess (per chart).   - Pt is ordered for antibiotics in-house (per orders).

## 2023-08-28 NOTE — DIETITIAN INITIAL EVALUATION ADULT - ORAL INTAKE PTA/DIET HISTORY
Nutrition assessment completed at bedside. Pt stated he has had a poor appetite x2 days prior to admission, only eating breakfast. However, pt stated his appetite and PO intake in the months prior to admission have been good. Pt stated his weight typically fluctuates between 330-340 pounds, has been consistently around there for the past year. Confirmed no known food allergies.  Nutrition assessment completed at bedside. Pt stated he has had a poor appetite x2 days prior to admission, only eating breakfast. However, pt stated his appetite and PO intake in the months prior to admission have been good. Pt stated his weight typically fluctuates between 330-340 pounds due to issues with his thyroid, has been consistently around that range for the past year. Confirmed no known food allergies.

## 2023-08-28 NOTE — DIETITIAN INITIAL EVALUATION ADULT - PERTINENT MEDS FT
MEDICATIONS  (STANDING):  atorvastatin 20 milliGRAM(s) Oral at bedtime  chlorhexidine 4% Liquid 1 Application(s) Topical daily  dextrose 5%. 1000 milliLiter(s) (100 mL/Hr) IV Continuous <Continuous>  dextrose 5%. 1000 milliLiter(s) (50 mL/Hr) IV Continuous <Continuous>  dextrose 50% Injectable 25 Gram(s) IV Push once  dextrose 50% Injectable 12.5 Gram(s) IV Push once  dextrose 50% Injectable 25 Gram(s) IV Push once  glucagon  Injectable 1 milliGRAM(s) IntraMuscular once  insulin glargine Injectable (LANTUS) 60 Unit(s) SubCutaneous at bedtime  insulin lispro (ADMELOG) corrective regimen sliding scale   SubCutaneous three times a day before meals  insulin lispro (ADMELOG) corrective regimen sliding scale   SubCutaneous at bedtime  insulin lispro Injectable (ADMELOG) 3 Unit(s) SubCutaneous three times a day before meals  lactated ringers. 1000 milliLiter(s) (75 mL/Hr) IV Continuous <Continuous>  levothyroxine 275 MICROGram(s) Oral daily  piperacillin/tazobactam IVPB.. 3.375 Gram(s) IV Intermittent every 8 hours  vancomycin  IVPB 1500 milliGRAM(s) IV Intermittent every 12 hours    MEDICATIONS  (PRN):  acetaminophen     Tablet .. 650 milliGRAM(s) Oral every 6 hours PRN Temp greater or equal to 38C (100.4F), Mild Pain (1 - 3)  dextrose Oral Gel 15 Gram(s) Oral once PRN Blood Glucose LESS THAN 70 milliGRAM(s)/deciliter  melatonin 3 milliGRAM(s) Oral at bedtime PRN Insomnia

## 2023-08-28 NOTE — DIETITIAN INITIAL EVALUATION ADULT - SIGNS/SYMPTOMS
as evidenced by A1C of 10.6%, estimated average glucose 258 mg/dL and pt requesting education. as evidenced by BMI >40.

## 2023-08-28 NOTE — PROGRESS NOTE ADULT - ASSESSMENT
37 year old man with DM on large amount of basaglar, with cellulitis and abscesses of LLE at thigh, no evidence of NSTI. s/p I&D x 2 08/26, improving on abx. Cx prelim with GPC and GNR    Recs:  - Continue abx, narrow based on cx results  - Glycemic control per Medicine  - F/u in office in 2 weeks, information added to d/c summary  - Please re-page as needed    ACS  [id number]

## 2023-08-28 NOTE — DISCHARGE NOTE PROVIDER - DETAILS OF MALNUTRITION DIAGNOSIS/DIAGNOSES
This patient has been assessed with a concern for Malnutrition and was treated during this hospitalization for the following Nutrition diagnosis/diagnoses:     -  08/28/2023: Morbid obesity (BMI > 40)

## 2023-08-28 NOTE — DISCHARGE NOTE PROVIDER - HOSPITAL COURSE
38yo M with PMH DM2 (A1c 8.8 6/2023), HTN, HLD, thyroid cancer s/p resection, numerous back abscesses presenting with L thigh cellulitis and abscess. Pt reports 3-4 days ago he noticed a pimple-like lesion on his L thigh that grew bigger and worsened in the last 2 days. Denies any scratches or bug bites. Does have a pet dog but denies any fleas or ticks around dog. Denies fever, n/v/d, chest pain, SOB.     In ED, vitals significant for T100.5 and . Labs and imaging significant for BG 400s, and CT with medical L thigh cellulitis and collection c/w abscess/cyst. S/p vanc/zosyn, clinda, 2L IVF, 10u humulin, and I&D with surgery.     Patient was initially started on antibiotics. Cultures of the abscess grew _______________________. 38yo M with PMH DM2 (A1c 8.8 6/2023), HTN, HLD, thyroid cancer s/p resection, numerous back abscesses presenting with L thigh cellulitis and abscess. Pt reports 3-4 days ago he noticed a pimple-like lesion on his L thigh that grew bigger and worsened in the last 2 days. Denies any scratches or bug bites. Does have a pet dog but denies any fleas or ticks around dog. Denies fever, n/v/d, chest pain, SOB.     In ED, vitals significant for T100.5 and . Labs and imaging significant for BG 400s, and CT with medical L thigh cellulitis and collection c/w abscess/cyst. S/p vanc/zosyn, clinda, 2L IVF, 10u humulin, and I&D with surgery completed I&D. In ED, patient reported flushing and had a decreased blood pressure to 97/52 for which vancomycin was stopped and daptomycin was ordered. The patient was then admitted and ID was consulted. They ruled out an anaphylaxis reaction and attributed the flushing to an anaphalactoid reaction to the rate at which vancomycin was given. We then resumed vancomycin at a slower rate and saw the area of cellulitis decrease with also a decrease in WBC count and resolution of fever. Cultures had no growth. Patient also received a CT lower extremity and Abdomen showed Medial left thigh cellulitis with a couple areas of more focal skin thickening including at the anteromedial upper thigh without abscess and more inferiorly in the medial thigh inclusive of a 3.2 cm intradermal collection, compatible with abscess/superinfected cyst.    The patient on admission had an A1C of 10.6  as well as initial blood glucose of 446. Patient stated that he has had difficulty accessing medications due to prior authorizations. Endocrine was consulted to manage medications.    38yo M with PMH DM2 (A1c 8.8 6/2023), HTN, HLD, thyroid cancer s/p resection, numerous back abscesses presenting with L thigh cellulitis and abscess. Pt reports 3-4 days ago he noticed a pimple-like lesion on his L thigh that grew bigger and worsened in the last 2 days. Denies any scratches or bug bites. Does have a pet dog but denies any fleas or ticks around dog. Denies fever, n/v/d, chest pain, SOB.     In ED, vitals significant for T100.5 and . Labs and imaging significant for BG 400s, and CT with medical L thigh cellulitis and collection c/w abscess/cyst. S/p vanc/zosyn, clinda, 2L IVF, 10u humulin, and I&D with surgery. In ED, patient reported flushing and had a decreased blood pressure to 97/52 for which vancomycin was stopped and daptomycin was ordered. The patient was then admitted and ID was consulted. They ruled out an anaphylaxis reaction and attributed the flushing to an anaphalactoid reaction to the rate at which vancomycin was given. We then resumed vancomycin at a slower rate and saw the area of cellulitis decrease with also a decrease in WBC count and resolution of fever. Vancomycin/Zosyn was transitioned to PO Augmentin. Cultures had no growth. Patient also received a CT lower extremity and Abdomen which showed Medial left thigh cellulitis with a couple areas of more focal skin thickening including at the anteromedial upper thigh without abscess and more inferiorly in the medial thigh inclusive of a 3.2 cm intradermal collection, compatible with abscess/superinfected cyst.    The patient on admission had an A1C of 10.6  as well as initial blood glucose of 446. Patient stated that he has had difficulty accessing medications due to prior authorizations. Endocrine was consulted to manage medications. He was placed on Basal/Bolus regimen and discharged on an insulin regimen. Patient hemodynamically stable on day of discharge and given follow up with endocrine, opthalmology, podiatry, and primary care.

## 2023-08-28 NOTE — PROGRESS NOTE ADULT - PROBLEM SELECTOR PLAN 4
BG 400s on presentation to ED without acidosis, BG now downtrending    s/p 10u humulin in ED  on ozempic and basaglar 68u at home, last A1c 8.8 6/2023, now 10.6 8/28  s/p 51 units lantus   - increase lantus 60 u and c/w 10u admelog per endocrine curbside, endocrine will see patient tommorow  - patient having difficulty getting prior authorization and access to Ozempic and Glargine. Social work and endocrine consulted for recs  - ISS, monitor BG, CC diet  - f/u endo recs
BG 400s on presentation to ED without acidosis, BG now downtrending    s/p 10u humulin in ED  on ozempic and basaglar 68u at home, last A1c 8.8 6/2023  s/p 51 units lantus   - increase lantus 60 u and c/w 3u admelog; titrate up as needed   - ISS, monitor BG, CC diet

## 2023-08-28 NOTE — DIETITIAN INITIAL EVALUATION ADULT - NUTRITIONGOAL OUTCOME2
Pt will be able to adhere to nutrition related recommendations and have gradual weight loss towards IBW while meeting estimated protein-energy needs.

## 2023-08-28 NOTE — DISCHARGE NOTE PROVIDER - NSFOLLOWUPCLINICS_GEN_ALL_ED_FT
A Podiatrist  Podiatry  .  [place]   Phone:   Fax:   Follow Up Time: 1 month    [facility]  Endocrinology  [address] [place] [address]  Phone: [phone]  Fax:   Follow Up Time: 2 weeks    An Ophthalmologist  Ophthalmology  .  [place]   Phone:   Fax:   Follow Up Time: 2 weeks    [facility] - Primary Care  Primary Care  [address] [place] [address]  Phone: [phone]  Fax:   Follow Up Time: 2 weeks

## 2023-08-28 NOTE — DISCHARGE NOTE PROVIDER - NSDCFUADDAPPT_GEN_ALL_CORE_FT
APPTS ARE READY TO BE MADE: [ x] YES    Best Family or Patient Contact (if needed):    Additional Information about above appointments (if needed):    1:   2:   3:     Other comments or requests:    APPTS ARE READY TO BE MADE: [ x] YES    Best Family or Patient Contact (if needed):    Additional Information about above appointments (if needed):    1:   2:   3:     Other comments or requests:   Patient was provided with follow up request details and was advised to call to schedule follow up within specified time frame. No scheduling assistance is needed at this time.

## 2023-08-28 NOTE — DIETITIAN INITIAL EVALUATION ADULT - NSFNSNUTRHOMESUPPLEMENTFT_GEN_A_CORE
Pt states they did not take any micronutrient supplements PTA, as well as oral nutrition supplements.

## 2023-08-28 NOTE — DIETITIAN INITIAL EVALUATION ADULT - OTHER CALCULATIONS
Estimated protein-energy needs calculated using IBW of 190 pounds.   Defer fluid calculations to the medical team.

## 2023-08-28 NOTE — DIETITIAN INITIAL EVALUATION ADULT - DATE OF WEIGHT PRIOR TO ADM
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 Unclear if EGPA vs MPA at present, features can match both and eosinophils only seen in some areas of her case. Given extensive Ab +, favor Rituxan as steroid sparing treatment with consideration for Nucala if former not effective. Has completed Rituxan induction which was well tolerated and without GI or cutaneous involvement at present and improving neurologic sx. No infectious sx. Some ongoing LBP of unclear etiology, suspect MSK related to her limited mobility as no ulcer or trauma. \par \par - s/p Rituxan, will plan for repeat dosing q4-6 months depending on her response\par - will continue with prednisone taper as she has  been tolerating well and has some steroid SE -- taper written out for her. \par - c/w PPI\par - c/w Mepron for PCP ppx until on <15mg/day of prednisone\par - c/w prn sparing use of opioids for low back pain, if worsening, will need MRI to evaluate \par - recent labs reviewed and stable, will repeat at next visit \par - RTC in 6 weeks, sooner if new/worsening sx \par \par  \par \par \par \par  13-Jun-2023

## 2023-08-28 NOTE — PROGRESS NOTE ADULT - PROBLEM SELECTOR PLAN 5
s/p resection  TSH: 0.45  T3: 83   - on levothyroxine 275mcg at home, continue
s/p resection  TSH: 0.45  T3: 83   - on levothyroxine 275mcg at home, continue

## 2023-08-28 NOTE — DIETITIAN INITIAL EVALUATION ADULT - NSFNSGIIOFT_GEN_A_CORE
Pt reported no nausea, vomiting, diarrhea or constipation at this time.   - Pt states his last BM was prior to admission.   Last documented BM: 08-26 (per flow sheet)  Bowel Regimen: Pt is not currently ordered for a bowel regimen (per orders)

## 2023-08-29 ENCOUNTER — TRANSCRIPTION ENCOUNTER (OUTPATIENT)
Age: 38
End: 2023-08-29

## 2023-08-29 VITALS
TEMPERATURE: 98 F | DIASTOLIC BLOOD PRESSURE: 85 MMHG | OXYGEN SATURATION: 97 % | RESPIRATION RATE: 20 BRPM | HEART RATE: 100 BPM | SYSTOLIC BLOOD PRESSURE: 139 MMHG

## 2023-08-29 DIAGNOSIS — E78.5 HYPERLIPIDEMIA, UNSPECIFIED: ICD-10-CM

## 2023-08-29 LAB
ALBUMIN SERPL ELPH-MCNC: 3.7 G/DL — SIGNIFICANT CHANGE UP (ref 3.3–5)
ALP SERPL-CCNC: 50 U/L — SIGNIFICANT CHANGE UP (ref 40–120)
ALT FLD-CCNC: 60 U/L — HIGH (ref 10–45)
ANION GAP SERPL CALC-SCNC: 15 MMOL/L — SIGNIFICANT CHANGE UP (ref 5–17)
AST SERPL-CCNC: 43 U/L — HIGH (ref 10–40)
BASOPHILS # BLD AUTO: 0.04 K/UL — SIGNIFICANT CHANGE UP (ref 0–0.2)
BASOPHILS NFR BLD AUTO: 0.6 % — SIGNIFICANT CHANGE UP (ref 0–2)
BILIRUB SERPL-MCNC: 0.5 MG/DL — SIGNIFICANT CHANGE UP (ref 0.2–1.2)
BUN SERPL-MCNC: 13 MG/DL — SIGNIFICANT CHANGE UP (ref 7–23)
CALCIUM SERPL-MCNC: 8.6 MG/DL — SIGNIFICANT CHANGE UP (ref 8.4–10.5)
CHLORIDE SERPL-SCNC: 101 MMOL/L — SIGNIFICANT CHANGE UP (ref 96–108)
CO2 SERPL-SCNC: 21 MMOL/L — LOW (ref 22–31)
CREAT SERPL-MCNC: 0.77 MG/DL — SIGNIFICANT CHANGE UP (ref 0.5–1.3)
EGFR: 117 ML/MIN/1.73M2 — SIGNIFICANT CHANGE UP
EOSINOPHIL # BLD AUTO: 0.24 K/UL — SIGNIFICANT CHANGE UP (ref 0–0.5)
EOSINOPHIL NFR BLD AUTO: 3.8 % — SIGNIFICANT CHANGE UP (ref 0–6)
GLUCOSE BLDC GLUCOMTR-MCNC: 169 MG/DL — HIGH (ref 70–99)
GLUCOSE BLDC GLUCOMTR-MCNC: 173 MG/DL — HIGH (ref 70–99)
GLUCOSE BLDC GLUCOMTR-MCNC: 175 MG/DL — HIGH (ref 70–99)
GLUCOSE SERPL-MCNC: 139 MG/DL — HIGH (ref 70–99)
HCT VFR BLD CALC: 40.4 % — SIGNIFICANT CHANGE UP (ref 39–50)
HGB BLD-MCNC: 14.2 G/DL — SIGNIFICANT CHANGE UP (ref 13–17)
IMM GRANULOCYTES NFR BLD AUTO: 0.6 % — SIGNIFICANT CHANGE UP (ref 0–0.9)
LYMPHOCYTES # BLD AUTO: 1.5 K/UL — SIGNIFICANT CHANGE UP (ref 1–3.3)
LYMPHOCYTES # BLD AUTO: 23.5 % — SIGNIFICANT CHANGE UP (ref 13–44)
MAGNESIUM SERPL-MCNC: 2 MG/DL — SIGNIFICANT CHANGE UP (ref 1.6–2.6)
MCHC RBC-ENTMCNC: 30.5 PG — SIGNIFICANT CHANGE UP (ref 27–34)
MCHC RBC-ENTMCNC: 35.1 GM/DL — SIGNIFICANT CHANGE UP (ref 32–36)
MCV RBC AUTO: 86.7 FL — SIGNIFICANT CHANGE UP (ref 80–100)
MONOCYTES # BLD AUTO: 0.64 K/UL — SIGNIFICANT CHANGE UP (ref 0–0.9)
MONOCYTES NFR BLD AUTO: 10 % — SIGNIFICANT CHANGE UP (ref 2–14)
MRSA PCR RESULT.: SIGNIFICANT CHANGE UP
NEUTROPHILS # BLD AUTO: 3.92 K/UL — SIGNIFICANT CHANGE UP (ref 1.8–7.4)
NEUTROPHILS NFR BLD AUTO: 61.5 % — SIGNIFICANT CHANGE UP (ref 43–77)
NRBC # BLD: 0 /100 WBCS — SIGNIFICANT CHANGE UP (ref 0–0)
PHOSPHATE SERPL-MCNC: 4.4 MG/DL — SIGNIFICANT CHANGE UP (ref 2.5–4.5)
PLATELET # BLD AUTO: 173 K/UL — SIGNIFICANT CHANGE UP (ref 150–400)
POTASSIUM SERPL-MCNC: 3.5 MMOL/L — SIGNIFICANT CHANGE UP (ref 3.5–5.3)
POTASSIUM SERPL-SCNC: 3.5 MMOL/L — SIGNIFICANT CHANGE UP (ref 3.5–5.3)
PROT SERPL-MCNC: 6.7 G/DL — SIGNIFICANT CHANGE UP (ref 6–8.3)
RBC # BLD: 4.66 M/UL — SIGNIFICANT CHANGE UP (ref 4.2–5.8)
RBC # FLD: 12.2 % — SIGNIFICANT CHANGE UP (ref 10.3–14.5)
S AUREUS DNA NOSE QL NAA+PROBE: DETECTED
SODIUM SERPL-SCNC: 137 MMOL/L — SIGNIFICANT CHANGE UP (ref 135–145)
WBC # BLD: 6.38 K/UL — SIGNIFICANT CHANGE UP (ref 3.8–10.5)
WBC # FLD AUTO: 6.38 K/UL — SIGNIFICANT CHANGE UP (ref 3.8–10.5)

## 2023-08-29 PROCEDURE — 87205 SMEAR GRAM STAIN: CPT

## 2023-08-29 PROCEDURE — 84480 ASSAY TRIIODOTHYRONINE (T3): CPT

## 2023-08-29 PROCEDURE — 82947 ASSAY GLUCOSE BLOOD QUANT: CPT

## 2023-08-29 PROCEDURE — 84443 ASSAY THYROID STIM HORMONE: CPT

## 2023-08-29 PROCEDURE — 99254 IP/OBS CNSLTJ NEW/EST MOD 60: CPT

## 2023-08-29 PROCEDURE — 80053 COMPREHEN METABOLIC PANEL: CPT

## 2023-08-29 PROCEDURE — 85730 THROMBOPLASTIN TIME PARTIAL: CPT

## 2023-08-29 PROCEDURE — 93005 ELECTROCARDIOGRAM TRACING: CPT

## 2023-08-29 PROCEDURE — 87640 STAPH A DNA AMP PROBE: CPT

## 2023-08-29 PROCEDURE — 36415 COLL VENOUS BLD VENIPUNCTURE: CPT

## 2023-08-29 PROCEDURE — 82962 GLUCOSE BLOOD TEST: CPT

## 2023-08-29 PROCEDURE — 84100 ASSAY OF PHOSPHORUS: CPT

## 2023-08-29 PROCEDURE — 87070 CULTURE OTHR SPECIMN AEROBIC: CPT

## 2023-08-29 PROCEDURE — 87077 CULTURE AEROBIC IDENTIFY: CPT

## 2023-08-29 PROCEDURE — 83036 HEMOGLOBIN GLYCOSYLATED A1C: CPT

## 2023-08-29 PROCEDURE — 81003 URINALYSIS AUTO W/O SCOPE: CPT

## 2023-08-29 PROCEDURE — 82803 BLOOD GASES ANY COMBINATION: CPT

## 2023-08-29 PROCEDURE — 87040 BLOOD CULTURE FOR BACTERIA: CPT

## 2023-08-29 PROCEDURE — 85027 COMPLETE CBC AUTOMATED: CPT

## 2023-08-29 PROCEDURE — 84132 ASSAY OF SERUM POTASSIUM: CPT

## 2023-08-29 PROCEDURE — 83735 ASSAY OF MAGNESIUM: CPT

## 2023-08-29 PROCEDURE — 96375 TX/PRO/DX INJ NEW DRUG ADDON: CPT

## 2023-08-29 PROCEDURE — 96374 THER/PROPH/DIAG INJ IV PUSH: CPT

## 2023-08-29 PROCEDURE — 84295 ASSAY OF SERUM SODIUM: CPT

## 2023-08-29 PROCEDURE — 85014 HEMATOCRIT: CPT

## 2023-08-29 PROCEDURE — 85018 HEMOGLOBIN: CPT

## 2023-08-29 PROCEDURE — 82435 ASSAY OF BLOOD CHLORIDE: CPT

## 2023-08-29 PROCEDURE — 85025 COMPLETE CBC W/AUTO DIFF WBC: CPT

## 2023-08-29 PROCEDURE — 83605 ASSAY OF LACTIC ACID: CPT

## 2023-08-29 PROCEDURE — 99239 HOSP IP/OBS DSCHRG MGMT >30: CPT | Mod: GC

## 2023-08-29 PROCEDURE — 87086 URINE CULTURE/COLONY COUNT: CPT

## 2023-08-29 PROCEDURE — 82330 ASSAY OF CALCIUM: CPT

## 2023-08-29 PROCEDURE — 73701 CT LOWER EXTREMITY W/DYE: CPT | Mod: MA

## 2023-08-29 PROCEDURE — 87641 MR-STAPH DNA AMP PROBE: CPT

## 2023-08-29 PROCEDURE — 74177 CT ABD & PELVIS W/CONTRAST: CPT | Mod: MA

## 2023-08-29 PROCEDURE — 85610 PROTHROMBIN TIME: CPT

## 2023-08-29 PROCEDURE — 99285 EMERGENCY DEPT VISIT HI MDM: CPT

## 2023-08-29 PROCEDURE — 82550 ASSAY OF CK (CPK): CPT

## 2023-08-29 PROCEDURE — 84436 ASSAY OF TOTAL THYROXINE: CPT

## 2023-08-29 PROCEDURE — 86140 C-REACTIVE PROTEIN: CPT

## 2023-08-29 RX ORDER — INSULIN GLARGINE 100 [IU]/ML
60 INJECTION, SOLUTION SUBCUTANEOUS
Qty: 6 | Refills: 0
Start: 2023-08-29

## 2023-08-29 RX ORDER — LEVOTHYROXINE SODIUM 125 MCG
5.5 TABLET ORAL
Refills: 0 | DISCHARGE

## 2023-08-29 RX ORDER — INSULIN GLARGINE 100 [IU]/ML
60 INJECTION, SOLUTION SUBCUTANEOUS
Qty: 0 | Refills: 0 | DISCHARGE
Start: 2023-08-29

## 2023-08-29 RX ORDER — ATORVASTATIN CALCIUM 80 MG/1
1 TABLET, FILM COATED ORAL
Qty: 0 | Refills: 0 | DISCHARGE

## 2023-08-29 RX ORDER — ATORVASTATIN CALCIUM 80 MG/1
1 TABLET, FILM COATED ORAL
Qty: 0 | Refills: 0 | DISCHARGE
Start: 2023-08-29

## 2023-08-29 RX ORDER — INSULIN GLARGINE 100 [IU]/ML
68 INJECTION, SOLUTION SUBCUTANEOUS
Refills: 0 | DISCHARGE

## 2023-08-29 RX ORDER — ATORVASTATIN CALCIUM 80 MG/1
40 TABLET, FILM COATED ORAL AT BEDTIME
Refills: 0 | Status: DISCONTINUED | OUTPATIENT
Start: 2023-08-29 | End: 2023-08-29

## 2023-08-29 RX ORDER — INSULIN GLARGINE 100 [IU]/ML
60 INJECTION, SOLUTION SUBCUTANEOUS
Qty: 1 | Refills: 1
Start: 2023-08-29

## 2023-08-29 RX ORDER — LEVOTHYROXINE SODIUM 125 MCG
11 TABLET ORAL
Qty: 0 | Refills: 0 | DISCHARGE
Start: 2023-08-29

## 2023-08-29 RX ADMIN — CHLORHEXIDINE GLUCONATE 1 APPLICATION(S): 213 SOLUTION TOPICAL at 13:40

## 2023-08-29 RX ADMIN — Medication 1 PATCH: at 13:42

## 2023-08-29 RX ADMIN — Medication 1: at 13:41

## 2023-08-29 RX ADMIN — Medication 1 PATCH: at 19:07

## 2023-08-29 RX ADMIN — Medication 1 TABLET(S): at 13:40

## 2023-08-29 RX ADMIN — Medication 10 UNIT(S): at 13:41

## 2023-08-29 RX ADMIN — Medication 10 UNIT(S): at 09:49

## 2023-08-29 RX ADMIN — Medication 200 MILLIGRAM(S): at 05:36

## 2023-08-29 RX ADMIN — PIPERACILLIN AND TAZOBACTAM 25 GRAM(S): 4; .5 INJECTION, POWDER, LYOPHILIZED, FOR SOLUTION INTRAVENOUS at 07:38

## 2023-08-29 RX ADMIN — Medication 1 TABLET(S): at 18:11

## 2023-08-29 RX ADMIN — Medication 275 MICROGRAM(S): at 05:38

## 2023-08-29 RX ADMIN — Medication 1 PATCH: at 13:43

## 2023-08-29 RX ADMIN — Medication 1: at 09:49

## 2023-08-29 RX ADMIN — Medication 10 UNIT(S): at 18:08

## 2023-08-29 RX ADMIN — Medication 1: at 18:07

## 2023-08-29 NOTE — DISCHARGE NOTE NURSING/CASE MANAGEMENT/SOCIAL WORK - NSDCPEEMAIL_GEN_ALL_CORE
[facility] for Tobacco Control email [email or web address]

## 2023-08-29 NOTE — PROGRESS NOTE ADULT - ATTENDING COMMENTS
Sepsis secondary to L thigh cellulitis and abscess- s/p I&D in ED- improved on Vancomycin and Zosyn  ID recommendations appreciated- will change to PO Augmentin 875 mg BID -> 9/5  Poorly controlled DM- hgA1c 10.7- per patient difficulty obtaining medications due to prior authorizations- endocrine eval  Pending endocrine recommendations likely d/c home later today- 45 minutes spent discharging the patient
Sepsis secondary to L thigh cellulitis and abscess- s/p I&D in ED- on Zosyn, now tolerating Vancomycin (slow infusion)- ID following  Poorly controlled DM- hgA1c 10.7- per patient difficulty obtaining medications due to prior authorizations- endo and SW evals
37M w/ PMHx IDDM2, HTN, HLD, morbid obesity, and thyroid CA s/p resection presenting for L thigh swelling, redness and pain, markedly worsening over the last 2 days. Found to meet SIRS criteria with cellulitis w/ abscess of L medial thigh. Admitted for IV abx for purulent cellulitis.    #Purulent Cellulitis  #DM  #HTN  #Thyroid CA s/p resection    - CT imaging personally reviewed, inflammatory changes of L medial thigh with skin collection noted  - continue daptomycin for now. F/u ID recs  - S/p I&D - F/u blood and abscess cultures, adjust treatment accordingly  - CK wnl, continue to trend while on dapto  - A1c, increase Lantus to 60U qhs, monitor blood glucose closely  - C/w levothyroxine 275mcg daily    Rest of care per plan above  D/W Dr Luciano

## 2023-08-29 NOTE — CONSULT NOTE ADULT - SUBJECTIVE AND OBJECTIVE BOX
HPI:  37 year old man with T2DM ,uncontrolled, admitted with cellulitis/sepsis, collection/abscess, now post I and D.   He has had diabetes for  4-5 years, Endocrinologist Dr Driver ( last visit january 2023).  He takes basaglar 68 units daily and ozempic 1.0 mg weekly, but ran out of the basaglar about 6 weeks ago and has not been taking it since then.  He does not limit carbohydrate intake, reports minimal exercise.  He checks glucose 2-3 times per week and values have been elevated since stopping the basaglar.  He has regular optho follow up, no retinopathy, also no nephropathy or neuropathy.  Reports no polyuria, polydipsia    He also has htn and hyperlipidemia, controlled with medication    He has a history of papillary thyroid cancer, post thyroidectomy, dose of levothyoxine was increased to 275 micrograms daily a few months ago    PAST MEDICAL & SURGICAL HISTORY:  History of heartburn      Morbid obesity with body mass index (BMI) of 40.0 to 49.9      Type 2 diabetes mellitus      Thyroid malignant neoplasm      History of tonsillectomy and adenoidectomy  as a child          FAMILY HISTORY:  Family history of colon cancer in mother  throat  no DM in first degree relatives        Social History:lives with wife and dog.  smokes 1 ppd cigarettes, wants to try nicotine patch on discharge    Outpatient Medications:  per chart  amoxicillin-clavulanate 875 mg-125 mg oral tablet: 1 tab(s) orally 2 times a day (29 Aug 2023 13:30)  atorvastatin 40 mg oral tablet: 1 tab(s) orally once a day (at bedtime) (29 Aug 2023 13:30)  insulin glargine 100 units/mL subcutaneous solution: 60 unit(s) subcutaneous once a day (at bedtime) (29 Aug 2023 13:30)  levothyroxine 25 mcg (0.025 mg) oral tablet: 11 tab(s) orally once a day (29 Aug 2023 13:30)  losartan-hydrochlorothiazide 50 mg-12.5 mg oral tablet: 1 tab(s) orally once a day (27 Aug 2023 01:16)  Ozempic 2 mg/1.5 mL (1 mg dose) subcutaneous solution: 1 milligram(s) subcutaneously once a week (27 Aug 2023 01:17)      MEDICATIONS  (STANDING):  amoxicillin  875 milliGRAM(s)/clavulanate 1 Tablet(s) Oral two times a day  atorvastatin 40 milliGRAM(s) Oral at bedtime  chlorhexidine 4% Liquid 1 Application(s) Topical daily  dextrose 5%. 1000 milliLiter(s) (50 mL/Hr) IV Continuous <Continuous>  dextrose 5%. 1000 milliLiter(s) (100 mL/Hr) IV Continuous <Continuous>  dextrose 50% Injectable 25 Gram(s) IV Push once  dextrose 50% Injectable 12.5 Gram(s) IV Push once  dextrose 50% Injectable 25 Gram(s) IV Push once  glucagon  Injectable 1 milliGRAM(s) IntraMuscular once  insulin glargine Injectable (LANTUS) 60 Unit(s) SubCutaneous at bedtime  insulin lispro (ADMELOG) corrective regimen sliding scale   SubCutaneous three times a day before meals  insulin lispro (ADMELOG) corrective regimen sliding scale   SubCutaneous at bedtime  insulin lispro Injectable (ADMELOG) 10 Unit(s) SubCutaneous three times a day before meals  levothyroxine 275 MICROGram(s) Oral daily  multivitamin 1 Tablet(s) Oral daily  nicotine -   7 mG/24Hr(s) Patch 1 Patch Transdermal daily    MEDICATIONS  (PRN):  acetaminophen     Tablet .. 650 milliGRAM(s) Oral every 6 hours PRN Temp greater or equal to 38C (100.4F), Mild Pain (1 - 3)  dextrose Oral Gel 15 Gram(s) Oral once PRN Blood Glucose LESS THAN 70 milliGRAM(s)/deciliter  melatonin 3 milliGRAM(s) Oral at bedtime PRN Insomnia      Allergies    No Known Allergies    Intolerances      Review of Systems:  Constitutional: No fever  Eyes: No blurry vision  Neuro: No headache  HEENT: No throat pain  Cardiovascular: No chest pain  Respiratory: No SOB, no cough  GI: No abdominal pain  : No dysuria  Skin: no rash  Psych: no depression  Endocrine: as noted in HPI  Hem/lymph: no swelling      PHYSICAL EXAM:  VITALS: T(C): 36.8 (08-29-23 @ 14:20)  T(F): 98.2 (08-29-23 @ 14:20), Max: 98.2 (08-29-23 @ 14:20)  HR: 80 (08-29-23 @ 14:20) (75 - 88)  BP: 110/71 (08-29-23 @ 14:20) (109/66 - 119/70)  RR:  (18 - 18)  SpO2:  (94% - 98%)  Wt(kg): 161  GENERAL: LYing in bed in NAD,   EYES: No proptosis,  HEENT:  Atraumatic, Normocephalic,   THYROID: well healed scar  RESPIRATORY: Clear to auscultation bilaterally  CARDIOVASCULAR: Regular rhythm;   GI: Soft, nontender, non distended, normal bowel sounds  MUSCULOSKELETAL: normal strength  NEURO: extraocular movements intact, no tremor,   PSYCH: Alert and oriented x 3, normal affect, normal mood      POCT Blood Glucose.: 173 mg/dL (08-29-23 @ 17:31)  POCT Blood Glucose.: 169 mg/dL (08-29-23 @ 13:25)  POCT Blood Glucose.: 175 mg/dL (08-29-23 @ 09:20)  POCT Blood Glucose.: 237 mg/dL (08-28-23 @ 21:42)  POCT Blood Glucose.: 372 mg/dL (08-28-23 @ 17:42)  POCT Blood Glucose.: 261 mg/dL (08-28-23 @ 13:20)  POCT Blood Glucose.: 221 mg/dL (08-28-23 @ 09:06)  POCT Blood Glucose.: 341 mg/dL (08-27-23 @ 21:44)  POCT Blood Glucose.: 299 mg/dL (08-27-23 @ 17:51)  POCT Blood Glucose.: 306 mg/dL (08-27-23 @ 12:43)  POCT Blood Glucose.: 257 mg/dL (08-27-23 @ 09:01)  POCT Blood Glucose.: 317 mg/dL (08-27-23 @ 00:20)                            14.2   6.38  )-----------( 173      ( 29 Aug 2023 07:38 )             40.4       08-29    137  |  101  |  13  ----------------------------<  139<H>  3.5   |  21<L>  |  0.77    eGFR: 117    Ca    8.6      08-29  Mg     2.0     08-29  Phos  4.4     08-29    TPro  6.7  /  Alb  3.7  /  TBili  0.5  /  DBili  x   /  AST  43<H>  /  ALT  60<H>  /  AlkPhos  50  08-29    Thyroid Function Tests:  08-26 @ 16:08 TSH 0.45 FreeT4 -- T3 83 Anti TPO -- Anti Thyroglobulin Ab -- TSI --      A1C with Estimated Average Glucose Result: 10.6 % (08-27-23 @ 07:31)        Radiology:

## 2023-08-29 NOTE — DISCHARGE NOTE NURSING/CASE MANAGEMENT/SOCIAL WORK - NSDCVIVACCINE_GEN_ALL_CORE_FT
influenza, injectable, quadrivalent, preservative free; 04-Oct-2019 17:52; Ana M Altamirano (RN); [employer]; G545F (Exp. Date: 30-Jun-2020); IntraMuscular; Deltoid Left.; 0.5 milliLiter(s); VIS (VIS Published: 15-Aug-2019, VIS Presented: 04-Oct-2019);

## 2023-08-29 NOTE — DISCHARGE NOTE NURSING/CASE MANAGEMENT/SOCIAL WORK - NSDCPEFALRISK_GEN_ALL_CORE
For information on Fall & Injury Prevention, visit: [email or web address] OR  [email or web address] OR  https://www.cdc.gov/steadi/patient.html

## 2023-08-29 NOTE — PROGRESS NOTE ADULT - REASON FOR ADMISSION
Cellulitis w/ abscess
no

## 2023-08-29 NOTE — DISCHARGE NOTE NURSING/CASE MANAGEMENT/SOCIAL WORK - PATIENT PORTAL LINK FT
You can access the FollowMyHealth Patient Portal offered by [facility] by registering at the following website: [email or web address]. By joining [facility]’s FollowMyHealth portal, you will also be able to view your health information using other applications (apps) compatible with our system.

## 2023-08-29 NOTE — CONSULT NOTE ADULT - ASSESSMENT
37 year old man with T2DM ,uncontrolled, admitted with cellulitis, collection/abscess, now post I and D    1) T2DM, with hyperglycemia  A1c 10.6  HOme regimen - ozempic 1.0 and basaglar 68 - not taking basaglar for past 6 weeks  Pt with reasonable glucose control today, continue basal bolus insulin with lantus 60 units at bedtime and admelog 10 units before meals  Continue admelog correction scales  For discharge:  Increase in A1c likely due to not taking basaglar.  Please send prescription to pharmacy to check if it is covered and if not send alternate basal insulin.  Plan to discharge on home regimen, basal insulin + ozempic.  He should follow up with Dr Driver post discharge    2) hyperlipidemia  continue atorvastatin    3) THyroid cancer and hypothyroidism  Continue current dose of levothyroxine, follow up as outpt for thyroid cancer monitoring including checking Tg.    Discussed with primary team    Lilliam Ferrer MD  pager  or via Teams on 8/29/23  Other times:  Diabetes team: [phone]   or email [email or web address]

## 2023-08-29 NOTE — DISCHARGE NOTE NURSING/CASE MANAGEMENT/SOCIAL WORK - NSDCPEWEB_GEN_ALL_CORE
[facility] for Tobacco Control website --- [email or web address] website --- [email or web address]

## 2023-08-31 LAB
CULTURE RESULTS: SIGNIFICANT CHANGE UP
CULTURE RESULTS: SIGNIFICANT CHANGE UP
SPECIMEN SOURCE: SIGNIFICANT CHANGE UP
SPECIMEN SOURCE: SIGNIFICANT CHANGE UP

## 2023-09-01 LAB
CULTURE RESULTS: SIGNIFICANT CHANGE UP
SPECIMEN SOURCE: SIGNIFICANT CHANGE UP

## 2023-09-05 ENCOUNTER — APPOINTMENT (OUTPATIENT)
Dept: ENDOCRINOLOGY | Facility: CLINIC | Age: 38
End: 2023-09-05

## 2023-09-07 ENCOUNTER — APPOINTMENT (OUTPATIENT)
Dept: ENDOCRINOLOGY | Facility: CLINIC | Age: 38
End: 2023-09-07
Payer: MEDICAID

## 2023-09-07 DIAGNOSIS — E89.0 POSTPROCEDURAL HYPOTHYROIDISM: ICD-10-CM

## 2023-09-07 DIAGNOSIS — R74.8 ABNORMAL LEVELS OF OTHER SERUM ENZYMES: ICD-10-CM

## 2023-09-07 PROCEDURE — 99443: CPT

## 2023-09-07 RX ORDER — LANCETS 28 GAUGE
EACH MISCELLANEOUS
Qty: 4 | Refills: 3 | Status: ACTIVE | COMMUNITY
Start: 2023-09-07 | End: 1900-01-01

## 2023-09-07 RX ORDER — BLOOD-GLUCOSE METER
KIT MISCELLANEOUS
Qty: 1 | Refills: 0 | Status: ACTIVE | COMMUNITY
Start: 2023-09-07 | End: 1900-01-01

## 2023-09-07 RX ORDER — BLOOD SUGAR DIAGNOSTIC
STRIP MISCELLANEOUS 4 TIMES DAILY
Qty: 4 | Refills: 3 | Status: ACTIVE | COMMUNITY
Start: 2023-09-07 | End: 1900-01-01

## 2023-11-02 ENCOUNTER — RX RENEWAL (OUTPATIENT)
Age: 38
End: 2023-11-02

## 2023-11-02 RX ORDER — LOSARTAN POTASSIUM AND HYDROCHLOROTHIAZIDE 12.5; 5 MG/1; MG/1
50-12.5 TABLET ORAL
Qty: 90 | Refills: 1 | Status: ACTIVE | COMMUNITY
Start: 2021-07-26 | End: 1900-01-01

## 2023-11-07 RX ORDER — INSULIN GLARGINE 100 [IU]/ML
100 INJECTION, SOLUTION SUBCUTANEOUS
Qty: 1 | Refills: 3 | Status: ACTIVE | COMMUNITY
Start: 2019-12-03 | End: 1900-01-01

## 2023-11-07 RX ORDER — FLASH GLUCOSE SENSOR
KIT MISCELLANEOUS
Qty: 6 | Refills: 1 | Status: ACTIVE | COMMUNITY
Start: 2022-07-20 | End: 1900-01-01

## 2023-12-22 RX ORDER — FLASH GLUCOSE SENSOR
KIT MISCELLANEOUS
Qty: 6 | Refills: 3 | Status: ACTIVE | COMMUNITY
Start: 2019-11-18 | End: 1900-01-01

## 2023-12-22 RX ORDER — PEN NEEDLE, DIABETIC 29 G X1/2"
32G X 4 MM NEEDLE, DISPOSABLE MISCELLANEOUS
Qty: 90 | Refills: 1 | Status: ACTIVE | COMMUNITY
Start: 2019-10-14 | End: 1900-01-01

## 2023-12-22 RX ORDER — INSULIN GLARGINE-YFGN 100 [IU]/ML
100 INJECTION, SOLUTION SUBCUTANEOUS
Qty: 1 | Refills: 1 | Status: ACTIVE | COMMUNITY
Start: 2023-12-22 | End: 1900-01-01

## 2023-12-22 RX ORDER — SEMAGLUTIDE 1.34 MG/ML
4 INJECTION, SOLUTION SUBCUTANEOUS
Qty: 3 | Refills: 2 | Status: ACTIVE | COMMUNITY
Start: 2023-12-22 | End: 1900-01-01

## 2024-02-05 ENCOUNTER — RX RENEWAL (OUTPATIENT)
Age: 39
End: 2024-02-05

## 2024-02-12 ENCOUNTER — APPOINTMENT (OUTPATIENT)
Dept: ENDOCRINOLOGY | Facility: CLINIC | Age: 39
End: 2024-02-12
Payer: COMMERCIAL

## 2024-02-12 VITALS
SYSTOLIC BLOOD PRESSURE: 102 MMHG | OXYGEN SATURATION: 97 % | BODY MASS INDEX: 42.66 KG/M2 | HEART RATE: 103 BPM | WEIGHT: 315 LBS | DIASTOLIC BLOOD PRESSURE: 74 MMHG | HEIGHT: 72 IN

## 2024-02-12 DIAGNOSIS — E04.1 NONTOXIC SINGLE THYROID NODULE: ICD-10-CM

## 2024-02-12 DIAGNOSIS — Z86.39 PERSONAL HISTORY OF OTHER ENDOCRINE, NUTRITIONAL AND METABOLIC DISEASE: ICD-10-CM

## 2024-02-12 PROCEDURE — 99214 OFFICE O/P EST MOD 30 MIN: CPT

## 2024-02-12 RX ORDER — BLOOD-GLUCOSE,RECEIVER,CONT
EACH MISCELLANEOUS
Qty: 1 | Refills: 0 | Status: ACTIVE | COMMUNITY
Start: 2024-02-12 | End: 1900-01-01

## 2024-02-12 RX ORDER — LEVOTHYROXINE SODIUM 0.03 MG/1
25 TABLET ORAL
Qty: 30 | Refills: 5 | Status: DISCONTINUED | COMMUNITY
Start: 2022-04-10 | End: 2024-02-12

## 2024-02-12 RX ORDER — ATORVASTATIN CALCIUM 20 MG/1
20 TABLET, FILM COATED ORAL
Qty: 90 | Refills: 3 | Status: ACTIVE | COMMUNITY
Start: 2020-03-12 | End: 1900-01-01

## 2024-02-12 RX ORDER — SEMAGLUTIDE 2.68 MG/ML
8 INJECTION, SOLUTION SUBCUTANEOUS
Qty: 3 | Refills: 2 | Status: ACTIVE | COMMUNITY
Start: 2020-04-27 | End: 1900-01-01

## 2024-02-12 RX ORDER — BLOOD-GLUCOSE SENSOR
EACH MISCELLANEOUS
Qty: 6 | Refills: 1 | Status: ACTIVE | COMMUNITY
Start: 2024-02-12 | End: 1900-01-01

## 2024-02-12 RX ORDER — LEVOTHYROXINE SODIUM 0.05 MG/1
50 TABLET ORAL
Qty: 90 | Refills: 1 | Status: DISCONTINUED | COMMUNITY
Start: 2020-12-15 | End: 2024-02-12

## 2024-02-13 PROBLEM — Z86.39 HISTORY OF GOITER: Status: RESOLVED | Noted: 2020-09-16 | Resolved: 2024-02-13

## 2024-02-13 NOTE — ASSESSMENT
[Diabetes Foot Care] : diabetes foot care [Carbohydrate Consistent Diet] : carbohydrate consistent diet [Long Term Vascular Complications] : long term vascular complications of diabetes [Importance of Diet and Exercise] : importance of diet and exercise to improve glycemic control, achieve weight loss and improve cardiovascular health [Exercise/Effect on Glucose] : exercise/effect on glucose [Hypoglycemia Management] : hypoglycemia management [Glucagon Use] : glucagon use [Ketone Testing] : ketone testing [Action and use of Insulin] : action and use of short and long-acting insulin [Self Monitoring of Blood Glucose] : self monitoring of blood glucose [Injection Technique, Storage, Sharps Disposal] : injection technique, storage, and sharps disposal [Insulin Self-Administration] : insulin self-administration [Sick-Day Management] : sick-day management [Retinopathy Screening] : Patient was referred to ophthalmology for retinopathy screening [FreeTextEntry1] : Patient is a 38-year-old male with history of diabetic ketoacidosis in the setting of newly diagnosed type 2 diabetes mellitus, prior antibody workup includes negative HAM, negative islet cell antibody, papillary thyroid cancer, obesity, hypertension, here for endocrinology follow-up.  1. Type 2 DM He was using the Craig sensor, glucose mostly in target. Basaglar 58 units at bedtime Ozempic 2.0 mg once weekly A1c 6.6% January 2024 which is currently at goal Referral given again for ophthalmologist Foot exam within normal limits today   2. Morbid obesity  BMI 46.1 kg/m2 Discussed weight loss surgery with patient.  We'll give referral to bariatric surgery.  In the past he had issues with consideration of bariatric surgery because his sister had a bad experience and he noted of a relative who had passed away from the complications of bariatric surgery. But currently he is more open to the idea.  3. HLD LDL goal <70mg/dl Patient was not taking atorvastatin 20 mg once daily. Recommend to resume atorvastatin 20 mg once daily Prescription sent to [facility] Patient was also told by primary care doctor That he no longer needs cholesterol medication which I have informed him should be continued.  LDL was greater than 100 mg/dL at PMDs blood work is scanned into the chart.  4. Blood pressure management.  BP goal <130/80 Check albumin/creatinine ratio annually.  5. Papillary thyroid cancer Patient is status post total thyroidectomy in December 2020.  Papillary thyroid cancer involving right lobe, classic variant with focal infiltrative follicular differentiation, 3.1 cm in greatest dimension.  3 out of 5 lymph nodes positive for metastatic carcinoma.   Patient underwent central neck dissection for the surgery. Status post radioactive iodine treatment in November 2021 Post therapy scan shows iodine avid tissue in the anterior neck/thyroid bed.  Stimulated thyroglobulin was 5.95 ng/mL. Currently taking levothyroxine 275 mcg once daily. Will keep TSH between 0.1-0.5.  Patient is overdue for thyroid ultrasound.  He will schedule an appointment as soon as possible.

## 2024-02-13 NOTE — HISTORY OF PRESENT ILLNESS
[FreeTextEntry1] : 38-year-old man here to follow-up for type 2 diabetes, papillary thyroid cancer, obesity, and hyperlipidemia.  Patient was diagnosed with type 2 diabetes when he was admitted in the hospital.  Patient works as a .  He eats dinner late at night.  Trying to follow a bicarb consistent diet. Patient is on Basaglar 58 units at bedtime Ozempic once weekly. A1c 6.6% in January 2024 which is currently at goal.  He has no known diabetic retinopathy or nephropathy.  Patient does not follow-up with an ophthalmologist.    Macrovascular complication: None  Regarding thyroid cancer, diagnosed in 2020.  Status post total thyroidectomy in December 2020.  Papillary thyroid cancer involving the right lobe, classic variant with focal infiltrative follicular differentiation, 3.1 cm in greatest dimension, 3 out of 5 lymph node positive for metastatic carcinoma.  Status post radioactive iodine treatment in November 2021. Currently taking levothyroxine 275 mcg once daily.   Thyroglobulin level 2/12/2024: <0.2, TSH 3.13 1/18/2022: <0.2, TSH 7.23 2/23/2021: 0.29, TSH 4.07  Patient needs to have repeat thyroid ultrasound.  Regarding obesity, patient is now willing to see a bariatric surgeon to possibly start the process.

## 2024-02-13 NOTE — PHYSICAL EXAM
[Alert] : alert [Well Nourished] : well nourished [No Acute Distress] : no acute distress [Well Developed] : well developed [Normal Sclera/Conjunctiva] : normal sclera/conjunctiva [EOMI] : extra ocular movement intact [No Proptosis] : no proptosis [Normal Oropharynx] : the oropharynx was normal [Well Healed Scar] : well healed scar [No Respiratory Distress] : no respiratory distress [No Accessory Muscle Use] : no accessory muscle use [Clear to Auscultation] : lungs were clear to auscultation bilaterally [Normal S1, S2] : normal S1 and S2 [Normal Rate] : heart rate was normal [Regular Rhythm] : with a regular rhythm [No Edema] : no peripheral edema [Pedal Pulses Normal] : the pedal pulses are present [Normal Bowel Sounds] : normal bowel sounds [Not Tender] : non-tender [Not Distended] : not distended [Soft] : abdomen soft [Normal Anterior Cervical Nodes] : no anterior cervical lymphadenopathy [No Spinal Tenderness] : no spinal tenderness [Spine Straight] : spine straight [No Stigmata of Cushings Syndrome] : no stigmata of Cushings Syndrome [Normal Gait] : normal gait [Normal Strength/Tone] : muscle strength and tone were normal [No Rash] : no rash [Normal] : normal [Full ROM] : with full range of motion [Normal Reflexes] : deep tendon reflexes were 2+ and symmetric [No Tremors] : no tremors [Oriented x3] : oriented to person, place, and time [Acanthosis Nigricans] : no acanthosis nigricans [Diminished Throughout Both Feet] : normal tactile sensation with monofilament testing throughout both feet

## 2024-02-13 NOTE — DATA REVIEWED
[FreeTextEntry1] : Please see scanned note Date 1/8/2024 Lipid profile Total cholesterol 186 Triglyceride 173 mg/dL high HDL 31 mg/dL low  mg/dL high Hemoglobin A1c 6.6% Glucose 120 BUN 11 Creatinine 0.75 BUN/creatinine ratio 15 Sodium 141 Potassium 4.7 Chloride 104 CO2 17 Calcium 8.6 mg/dL Protein 7.1 Albumin 4.4 Globulin 2.7 Bilirubin 0.4 Alk phos 53 AST 27 ALT 48 EGFR 117 TSH 7.23 high WBC 7.1 Hemoglobin 15.5 Hematocrit 45.7 Platelets 164

## 2024-02-13 NOTE — THERAPY
Spoke with Gisell at [facility].  Still no rooms available.  May need to bring him directly into [facility] if procedure is scheduled.  Had patient NPO this morning in anticipation of procedure.   [Today's Date] : [unfilled] [Basaglar] : Basaglar [FreeTextEntry9] : 58 units once daily at night time  [TextEntry] : thyroid medication: LT4 275mcg daily

## 2024-02-16 LAB
T4 FREE SERPL-MCNC: 1.3 NG/DL
THYROGLOB AB SERPL-ACNC: <20 IU/ML
THYROGLOB SERPL-MCNC: <0.2 NG/ML
TSH SERPL-ACNC: 3.13 UIU/ML

## 2024-02-16 RX ORDER — LEVOTHYROXINE SODIUM 0.09 MG/1
88 TABLET ORAL
Qty: 90 | Refills: 1 | Status: ACTIVE | COMMUNITY
Start: 2023-12-22 | End: 1900-01-01

## 2024-03-06 ENCOUNTER — RX RENEWAL (OUTPATIENT)
Age: 39
End: 2024-03-06

## 2024-03-06 RX ORDER — LEVOTHYROXINE SODIUM 0.2 MG/1
200 TABLET ORAL
Qty: 90 | Refills: 1 | Status: ACTIVE | COMMUNITY
Start: 2021-03-02 | End: 1900-01-01

## 2024-03-24 ENCOUNTER — RX RENEWAL (OUTPATIENT)
Age: 39
End: 2024-03-24

## 2024-03-24 RX ORDER — INSULIN GLARGINE 100 [IU]/ML
100 INJECTION, SOLUTION SUBCUTANEOUS
Qty: 15 | Refills: 1 | Status: ACTIVE | COMMUNITY
Start: 2019-10-14 | End: 1900-01-01

## 2024-05-07 ENCOUNTER — APPOINTMENT (OUTPATIENT)
Dept: ENDOCRINOLOGY | Facility: CLINIC | Age: 39
End: 2024-05-07
Payer: COMMERCIAL

## 2024-05-07 VITALS
SYSTOLIC BLOOD PRESSURE: 130 MMHG | WEIGHT: 315 LBS | HEIGHT: 72 IN | HEART RATE: 98 BPM | DIASTOLIC BLOOD PRESSURE: 84 MMHG | BODY MASS INDEX: 42.66 KG/M2 | OXYGEN SATURATION: 98 %

## 2024-05-07 DIAGNOSIS — C73 MALIGNANT NEOPLASM OF THYROID GLAND: ICD-10-CM

## 2024-05-07 DIAGNOSIS — E78.5 HYPERLIPIDEMIA, UNSPECIFIED: ICD-10-CM

## 2024-05-07 DIAGNOSIS — R74.01 ELEVATION OF LEVELS OF LIVER TRANSAMINASE LEVELS: ICD-10-CM

## 2024-05-07 DIAGNOSIS — E66.01 MORBID (SEVERE) OBESITY DUE TO EXCESS CALORIES: ICD-10-CM

## 2024-05-07 DIAGNOSIS — E11.9 TYPE 2 DIABETES MELLITUS W/OUT COMPLICATIONS: ICD-10-CM

## 2024-05-07 LAB
GLUCOSE BLDC GLUCOMTR-MCNC: 256
HBA1C MFR BLD HPLC: 10.1

## 2024-05-07 PROCEDURE — 83036 HEMOGLOBIN GLYCOSYLATED A1C: CPT | Mod: QW

## 2024-05-07 PROCEDURE — 99215 OFFICE O/P EST HI 40 MIN: CPT

## 2024-05-07 PROCEDURE — 82962 GLUCOSE BLOOD TEST: CPT

## 2024-05-07 PROCEDURE — 95251 CONT GLUC MNTR ANALYSIS I&R: CPT

## 2024-05-07 RX ORDER — METFORMIN ER 500 MG 500 MG/1
500 TABLET ORAL
Qty: 30 | Refills: 0 | Status: ACTIVE | COMMUNITY
Start: 2020-01-21 | End: 1900-01-01

## 2024-05-08 NOTE — ASSESSMENT
[Diabetes Foot Care] : diabetes foot care [Long Term Vascular Complications] : long term vascular complications of diabetes [Carbohydrate Consistent Diet] : carbohydrate consistent diet [Importance of Diet and Exercise] : importance of diet and exercise to improve glycemic control, achieve weight loss and improve cardiovascular health [Exercise/Effect on Glucose] : exercise/effect on glucose [Hypoglycemia Management] : hypoglycemia management [Glucagon Use] : glucagon use [Ketone Testing] : ketone testing [Action and use of Insulin] : action and use of short and long-acting insulin [Self Monitoring of Blood Glucose] : self monitoring of blood glucose [Insulin Self-Administration] : insulin self-administration [Injection Technique, Storage, Sharps Disposal] : injection technique, storage, and sharps disposal [Sick-Day Management] : sick-day management [Retinopathy Screening] : Patient was referred to ophthalmology for retinopathy screening [FreeTextEntry1] : Patient is a 38-year-old male with history of diabetic ketoacidosis in the setting of newly diagnosed type 2 diabetes mellitus, prior antibody workup includes negative HAM, negative islet cell antibody, papillary thyroid cancer, obesity, hypertension, here for endocrinology follow-up.  1. Type 2 DM A1c 6.6% in January 2024 --> 10.1% today (5/7/2024). Unclear why the rise in A1c, patient denies steroids, changes in diet or exercise or stress level. H/o DKA? Can check c-peptide. Adjust Semglee to 38 units BID (will switch to BID for now due to taking high doses). Discussed strategies for increased adherence to medications. Continue Ozempic 2.0 mg once weekly. When Mounjaro shortage is resolved, can consider that. Patient amenable to retrial low dose metformin, can start with metformin  mg one tablet daily with meal. If intolerable side effects again then can stop. Restart bolus insulin 10 units before each meal for now (occasional prandial BG up to 500 recently).  Referral given again for ophthalmologist Foot exam within normal limits in 2024. Lengthy discussion regarding dietary modifications and increased exercise today. Patient may need PA for [email or web address] 14-day, or we can reinvestigate coverage for [email or web address] 3. Assisted patient with Craig 3 chago download and teach today, sample given. Advised to call to review download in 2 weeks. Call for persistent highs or lows.  2. Morbid obesity  BMI 48.8 kg/m2 Discussed weight loss surgery with patient.  Previously given referral to bariatric surgery.  In the past he had issues with consideration of bariatric surgery because his sister had a bad experience and he noted of a relative who had passed away from the complications of bariatric surgery. But currently he is more open to the idea, he is still considering.  3. HLD LDL goal <70mg/dl Resumed atorvastatin 20 mg once daily last visit 2/2024. Patient was also told by primary care doctor That he no longer needs cholesterol medication which I have informed him should be continued.  LDL was greater than 100 mg/dL at [facility]Ds blood work is scanned into the chart. Can recheck fasting lipid panel now.   4. Blood pressure management.  BP goal <130/80 eGFR 115 (6/5/2023). UACR negative (1/2022). Check albumin/creatinine ratio annually.  5. Papillary thyroid cancer Patient is status post total thyroidectomy in December 2020.  Papillary thyroid cancer involving right lobe, classic variant with focal infiltrative follicular differentiation, 3.1 cm in greatest dimension.  3 out of 5 lymph nodes positive for metastatic carcinoma.   Patient underwent central neck dissection for the surgery. Status post radioactive iodine treatment in November 2021 Post therapy scan shows iodine avid tissue in the anterior neck/thyroid bed.  Stimulated thyroglobulin was 5.95 ng/mL. Currently taking levothyroxine 288 mcg once daily (dose increased 2/16/2024). Reports sometimes misses 0-1x per week.   Recheck TFTs and thyroglobulin today. Discussed strategies for increased adherence. Will keep TSH between 0.1-0.5.  Patient is overdue for thyroid ultrasound.  He will schedule an appointment as soon as possible. Order given again today.  6. H/o vitamin D deficiency. Not on vitamin D supplement. Can recheck vitamin D level today.  F/u in 3 months with Dr. Driver.  Susan Alvarez NP (Brenda)

## 2024-05-08 NOTE — HISTORY OF PRESENT ILLNESS
[FreeTextEntry1] : 38-year-old man here to follow-up for type 2 diabetes, papillary thyroid cancer, obesity, and hyperlipidemia. Accompanied by wife today.   Patient was diagnosed with type 2 diabetes when he was admitted in the hospital.  Patient works as a .  He eats dinner late at night.  Trying to follow a bicarb consistent diet. A1c 6.6% in January 2024 --> 10.1% today (5/7/2024). Unclear why the rise in A1c, patient denies steroids, changes in diet or exercise or stress level.   Current DM Meds: Semglee 68 units qhs (reports misses about 2-3x every 14 days, Basaglar no longer covered) Ozempic 2 mg once weekly (usually adherent, may miss once in a while).  Previous DM Meds: -tried metformin years ago, had some abdominal pain on it  CGM (Craig 14 day- has been paying OOP for it): Downloaded and reviewed today (4/23/24-5/6/24): TIR 0%, very low 0%, low 0%, high 39%, very high 61%, GMI N/A, avg gluc 300, time CGM active 13%. Pattern: missing a lot of data because his 14-day sensors keep falling off, fasting BG seems to be >200, prandial BG ranging from 200-500 on a few days.  POCT 256 today- patient reports this is a fasting value. Denies hypoglycemia.    Macrovascular complication: None Ophtho- last ophtho a while ago, has cataracts, denies DR Podiatry- no active feet issues, denies neuropathy, denies foot ulcers or sores Renal- eGFR 115 (6/5/2023). UACR negative (1/2022). Denies  infections.  Weight: stable around 360 lbs, up and down a few lbs  +Reports polyuria more recently.  Diet: 1-3 meals a day lunch- gyro dinner- chicken lemon soup bedtime snack- sometimes, whatever is in the house, chocolate croissant yesterday drinks- mostly water, has been drinking fresh juice, on occasion diet soda Exercise: active at work  Regarding thyroid cancer, diagnosed in 2020.  Status post total thyroidectomy in December 2020.  Papillary thyroid cancer involving the right lobe, classic variant with focal infiltrative follicular differentiation, 3.1 cm in greatest dimension, 3 out of 5 lymph node positive for metastatic carcinoma.  Status post radioactive iodine treatment in November 2021. Currently taking levothyroxine 288 mcg once daily (dose increased 2/16/2024). Reports sometimes misses 0-1x/week.    Thyroglobulin level 2/12/2024: <0.2, TSH 3.13 1/18/2022: <0.2, TSH 7.23 2/23/2021: 0.29, TSH 4.07  Denies dysphagia. Reports occasional "muscle twitching in neck". Patient needs to have repeat thyroid ultrasound.  Regarding obesity, patient is now willing to see a bariatric surgeon to possibly start the process. He has not had bariatric consult yet, still considering.   HTN: he is off BP meds HLD: on atorvastatin 20 mg daily. LDL 75, HDL 31,  (6/5/2023).

## 2024-05-08 NOTE — THERAPY
[Today's Date] : [unfilled] [Basaglar] : Basaglar [FreeTextEntry9] : 58 units once daily at night time  [TextEntry] : thyroid medication: LT4 275mcg daily

## 2024-05-22 LAB
25(OH)D3 SERPL-MCNC: 21.6 NG/ML
ALBUMIN SERPL ELPH-MCNC: 4.9 G/DL
ALP BLD-CCNC: 65 U/L
ALT SERPL-CCNC: 140 U/L
ANION GAP SERPL CALC-SCNC: 15 MMOL/L
AST SERPL-CCNC: 86 U/L
BILIRUB SERPL-MCNC: 0.9 MG/DL
BUN SERPL-MCNC: 17 MG/DL
C PEPTIDE SERPL-MCNC: 4.1 NG/ML
CALCIUM SERPL-MCNC: 9.9 MG/DL
CHLORIDE SERPL-SCNC: 100 MMOL/L
CHOLEST SERPL-MCNC: 142 MG/DL
CO2 SERPL-SCNC: 25 MMOL/L
CREAT SERPL-MCNC: 0.79 MG/DL
CREAT SPEC-SCNC: 247 MG/DL
EGFR: 115 ML/MIN/1.73M2
GLUCOSE SERPL-MCNC: 207 MG/DL
HDLC SERPL-MCNC: 34 MG/DL
LDLC SERPL CALC-MCNC: 81 MG/DL
MICROALBUMIN 24H UR DL<=1MG/L-MCNC: 3.6 MG/DL
MICROALBUMIN/CREAT 24H UR-RTO: 15 MG/G
NONHDLC SERPL-MCNC: 108 MG/DL
POTASSIUM SERPL-SCNC: 4.8 MMOL/L
PROT SERPL-MCNC: 7.5 G/DL
SODIUM SERPL-SCNC: 140 MMOL/L
T4 FREE SERPL-MCNC: 1.9 NG/DL
THYROGLOB AB SERPL-ACNC: <20 IU/ML
THYROGLOB SERPL-MCNC: 0.85 NG/ML
TRIGL SERPL-MCNC: 156 MG/DL
TSH SERPL-ACNC: 0.52 UIU/ML

## 2024-05-23 RX ORDER — INSULIN LISPRO 100 U/ML
100 INJECTION, SOLUTION SUBCUTANEOUS
Qty: 2 | Refills: 0 | Status: ACTIVE | COMMUNITY
Start: 2024-05-07

## 2024-05-25 ENCOUNTER — APPOINTMENT (OUTPATIENT)
Dept: ULTRASOUND IMAGING | Facility: IMAGING CENTER | Age: 39
End: 2024-05-25
Payer: COMMERCIAL

## 2024-05-25 ENCOUNTER — OUTPATIENT (OUTPATIENT)
Dept: OUTPATIENT SERVICES | Facility: HOSPITAL | Age: 39
LOS: 1 days | End: 2024-05-25
Payer: COMMERCIAL

## 2024-05-25 DIAGNOSIS — Z98.890 OTHER SPECIFIED POSTPROCEDURAL STATES: Chronic | ICD-10-CM

## 2024-05-25 DIAGNOSIS — C73 MALIGNANT NEOPLASM OF THYROID GLAND: ICD-10-CM

## 2024-05-25 PROCEDURE — 76536 US EXAM OF HEAD AND NECK: CPT | Mod: 26

## 2024-05-25 PROCEDURE — 76536 US EXAM OF HEAD AND NECK: CPT

## 2024-07-19 ENCOUNTER — EMERGENCY (EMERGENCY)
Facility: HOSPITAL | Age: 39
LOS: 1 days | Discharge: HOME | End: 2024-07-19
Attending: STUDENT IN AN ORGANIZED HEALTH CARE EDUCATION/TRAINING PROGRAM
Payer: COMMERCIAL

## 2024-07-19 VITALS
TEMPERATURE: 98 F | WEIGHT: 315 LBS | HEIGHT: 74 IN | DIASTOLIC BLOOD PRESSURE: 100 MMHG | OXYGEN SATURATION: 98 % | RESPIRATION RATE: 20 BRPM | SYSTOLIC BLOOD PRESSURE: 140 MMHG | HEART RATE: 73 BPM

## 2024-07-19 DIAGNOSIS — Z98.890 OTHER SPECIFIED POSTPROCEDURAL STATES: Chronic | ICD-10-CM

## 2024-07-19 PROCEDURE — 99285 EMERGENCY DEPT VISIT HI MDM: CPT

## 2024-07-20 VITALS
DIASTOLIC BLOOD PRESSURE: 66 MMHG | HEART RATE: 78 BPM | SYSTOLIC BLOOD PRESSURE: 100 MMHG | TEMPERATURE: 98 F | RESPIRATION RATE: 18 BRPM | OXYGEN SATURATION: 95 %

## 2024-07-20 LAB
ALBUMIN SERPL ELPH-MCNC: 4.6 G/DL — SIGNIFICANT CHANGE UP (ref 3.3–5)
ALP SERPL-CCNC: 60 U/L — SIGNIFICANT CHANGE UP (ref 40–120)
ALT FLD-CCNC: 74 U/L — HIGH (ref 10–45)
ANION GAP SERPL CALC-SCNC: 18 MMOL/L — HIGH (ref 5–17)
APPEARANCE UR: ABNORMAL
AST SERPL-CCNC: 41 U/L — HIGH (ref 10–40)
BACTERIA # UR AUTO: NEGATIVE /HPF — SIGNIFICANT CHANGE UP
BASE EXCESS BLDV CALC-SCNC: 0.6 MMOL/L — SIGNIFICANT CHANGE UP (ref -2–3)
BASE EXCESS BLDV CALC-SCNC: 1.2 MMOL/L — SIGNIFICANT CHANGE UP (ref -2–3)
BASOPHILS # BLD AUTO: 0.04 K/UL — SIGNIFICANT CHANGE UP (ref 0–0.2)
BASOPHILS NFR BLD AUTO: 0.5 % — SIGNIFICANT CHANGE UP (ref 0–2)
BILIRUB SERPL-MCNC: 0.7 MG/DL — SIGNIFICANT CHANGE UP (ref 0.2–1.2)
BILIRUB UR-MCNC: NEGATIVE — SIGNIFICANT CHANGE UP
BUN SERPL-MCNC: 16 MG/DL — SIGNIFICANT CHANGE UP (ref 7–23)
CA-I SERPL-SCNC: 1.06 MMOL/L — LOW (ref 1.15–1.33)
CA-I SERPL-SCNC: 1.1 MMOL/L — LOW (ref 1.15–1.33)
CALCIUM SERPL-MCNC: 8.9 MG/DL — SIGNIFICANT CHANGE UP (ref 8.4–10.5)
CAST: 5 /LPF — HIGH (ref 0–4)
CHLORIDE BLDV-SCNC: 101 MMOL/L — SIGNIFICANT CHANGE UP (ref 96–108)
CHLORIDE BLDV-SCNC: 103 MMOL/L — SIGNIFICANT CHANGE UP (ref 96–108)
CHLORIDE SERPL-SCNC: 100 MMOL/L — SIGNIFICANT CHANGE UP (ref 96–108)
CO2 BLDV-SCNC: 27 MMOL/L — HIGH (ref 22–26)
CO2 BLDV-SCNC: 29 MMOL/L — HIGH (ref 22–26)
CO2 SERPL-SCNC: 21 MMOL/L — LOW (ref 22–31)
COLOR SPEC: ABNORMAL
CREAT SERPL-MCNC: 0.99 MG/DL — SIGNIFICANT CHANGE UP (ref 0.5–1.3)
DIFF PNL FLD: ABNORMAL
EGFR: 99 ML/MIN/1.73M2 — SIGNIFICANT CHANGE UP
EOSINOPHIL # BLD AUTO: 0.05 K/UL — SIGNIFICANT CHANGE UP (ref 0–0.5)
EOSINOPHIL NFR BLD AUTO: 0.6 % — SIGNIFICANT CHANGE UP (ref 0–6)
GAS PNL BLDV: 134 MMOL/L — LOW (ref 136–145)
GAS PNL BLDV: 136 MMOL/L — SIGNIFICANT CHANGE UP (ref 136–145)
GAS PNL BLDV: SIGNIFICANT CHANGE UP
GAS PNL BLDV: SIGNIFICANT CHANGE UP
GLUCOSE BLDV-MCNC: 194 MG/DL — HIGH (ref 70–99)
GLUCOSE BLDV-MCNC: 294 MG/DL — HIGH (ref 70–99)
GLUCOSE SERPL-MCNC: 271 MG/DL — HIGH (ref 70–99)
GLUCOSE UR QL: >=1000 MG/DL
HCO3 BLDV-SCNC: 26 MMOL/L — SIGNIFICANT CHANGE UP (ref 22–29)
HCO3 BLDV-SCNC: 27 MMOL/L — SIGNIFICANT CHANGE UP (ref 22–29)
HCT VFR BLD CALC: 47.2 % — SIGNIFICANT CHANGE UP (ref 39–50)
HCT VFR BLDA CALC: 43 % — SIGNIFICANT CHANGE UP (ref 39–51)
HCT VFR BLDA CALC: 48 % — SIGNIFICANT CHANGE UP (ref 39–51)
HGB BLD CALC-MCNC: 14.3 G/DL — SIGNIFICANT CHANGE UP (ref 12.6–17.4)
HGB BLD CALC-MCNC: 16.1 G/DL — SIGNIFICANT CHANGE UP (ref 12.6–17.4)
HGB BLD-MCNC: 16 G/DL — SIGNIFICANT CHANGE UP (ref 13–17)
IMM GRANULOCYTES NFR BLD AUTO: 0.3 % — SIGNIFICANT CHANGE UP (ref 0–0.9)
KETONES UR-MCNC: 40 MG/DL
LACTATE BLDV-MCNC: 1.7 MMOL/L — SIGNIFICANT CHANGE UP (ref 0.5–2)
LACTATE BLDV-MCNC: 2.2 MMOL/L — HIGH (ref 0.5–2)
LEUKOCYTE ESTERASE UR-ACNC: ABNORMAL
LIDOCAIN IGE QN: 15 U/L — SIGNIFICANT CHANGE UP (ref 7–60)
LYMPHOCYTES # BLD AUTO: 0.71 K/UL — LOW (ref 1–3.3)
LYMPHOCYTES # BLD AUTO: 8.1 % — LOW (ref 13–44)
MCHC RBC-ENTMCNC: 29.5 PG — SIGNIFICANT CHANGE UP (ref 27–34)
MCHC RBC-ENTMCNC: 33.9 GM/DL — SIGNIFICANT CHANGE UP (ref 32–36)
MCV RBC AUTO: 86.9 FL — SIGNIFICANT CHANGE UP (ref 80–100)
MONOCYTES # BLD AUTO: 0.47 K/UL — SIGNIFICANT CHANGE UP (ref 0–0.9)
MONOCYTES NFR BLD AUTO: 5.3 % — SIGNIFICANT CHANGE UP (ref 2–14)
NEUTROPHILS # BLD AUTO: 7.49 K/UL — HIGH (ref 1.8–7.4)
NEUTROPHILS NFR BLD AUTO: 85.2 % — HIGH (ref 43–77)
NITRITE UR-MCNC: NEGATIVE — SIGNIFICANT CHANGE UP
NRBC # BLD: 0 /100 WBCS — SIGNIFICANT CHANGE UP (ref 0–0)
PCO2 BLDV: 41 MMHG — LOW (ref 42–55)
PCO2 BLDV: 49 MMHG — SIGNIFICANT CHANGE UP (ref 42–55)
PH BLDV: 7.35 — SIGNIFICANT CHANGE UP (ref 7.32–7.43)
PH BLDV: 7.41 — SIGNIFICANT CHANGE UP (ref 7.32–7.43)
PH UR: 5.5 — SIGNIFICANT CHANGE UP (ref 5–8)
PLATELET # BLD AUTO: 175 K/UL — SIGNIFICANT CHANGE UP (ref 150–400)
PO2 BLDV: 36 MMHG — SIGNIFICANT CHANGE UP (ref 25–45)
PO2 BLDV: 62 MMHG — HIGH (ref 25–45)
POTASSIUM BLDV-SCNC: 3.7 MMOL/L — SIGNIFICANT CHANGE UP (ref 3.5–5.1)
POTASSIUM BLDV-SCNC: 4 MMOL/L — SIGNIFICANT CHANGE UP (ref 3.5–5.1)
POTASSIUM SERPL-MCNC: 3.6 MMOL/L — SIGNIFICANT CHANGE UP (ref 3.5–5.3)
POTASSIUM SERPL-SCNC: 3.6 MMOL/L — SIGNIFICANT CHANGE UP (ref 3.5–5.3)
PROT SERPL-MCNC: 7.6 G/DL — SIGNIFICANT CHANGE UP (ref 6–8.3)
PROT UR-MCNC: 100 MG/DL
RBC # BLD: 5.43 M/UL — SIGNIFICANT CHANGE UP (ref 4.2–5.8)
RBC # FLD: 12.6 % — SIGNIFICANT CHANGE UP (ref 10.3–14.5)
RBC CASTS # UR COMP ASSIST: 391 /HPF — HIGH (ref 0–4)
REVIEW: SIGNIFICANT CHANGE UP
SAO2 % BLDV: 59.5 % — LOW (ref 67–88)
SAO2 % BLDV: 92.9 % — HIGH (ref 67–88)
SODIUM SERPL-SCNC: 139 MMOL/L — SIGNIFICANT CHANGE UP (ref 135–145)
SP GR SPEC: 1.01 — SIGNIFICANT CHANGE UP (ref 1–1.03)
SQUAMOUS # UR AUTO: 6 /HPF — HIGH (ref 0–5)
UROBILINOGEN FLD QL: 1 MG/DL — SIGNIFICANT CHANGE UP (ref 0.2–1)
WBC # BLD: 8.79 K/UL — SIGNIFICANT CHANGE UP (ref 3.8–10.5)
WBC # FLD AUTO: 8.79 K/UL — SIGNIFICANT CHANGE UP (ref 3.8–10.5)
WBC UR QL: 8 /HPF — HIGH (ref 0–5)

## 2024-07-20 PROCEDURE — 84132 ASSAY OF SERUM POTASSIUM: CPT

## 2024-07-20 PROCEDURE — 80053 COMPREHEN METABOLIC PANEL: CPT

## 2024-07-20 PROCEDURE — 82947 ASSAY GLUCOSE BLOOD QUANT: CPT

## 2024-07-20 PROCEDURE — 93005 ELECTROCARDIOGRAM TRACING: CPT

## 2024-07-20 PROCEDURE — 82435 ASSAY OF BLOOD CHLORIDE: CPT

## 2024-07-20 PROCEDURE — 85018 HEMOGLOBIN: CPT

## 2024-07-20 PROCEDURE — 74177 CT ABD & PELVIS W/CONTRAST: CPT | Mod: MC

## 2024-07-20 PROCEDURE — 82962 GLUCOSE BLOOD TEST: CPT

## 2024-07-20 PROCEDURE — 99285 EMERGENCY DEPT VISIT HI MDM: CPT | Mod: 25

## 2024-07-20 PROCEDURE — 87086 URINE CULTURE/COLONY COUNT: CPT

## 2024-07-20 PROCEDURE — 74177 CT ABD & PELVIS W/CONTRAST: CPT | Mod: 26,MC

## 2024-07-20 PROCEDURE — 82330 ASSAY OF CALCIUM: CPT

## 2024-07-20 PROCEDURE — 83605 ASSAY OF LACTIC ACID: CPT

## 2024-07-20 PROCEDURE — 84295 ASSAY OF SERUM SODIUM: CPT

## 2024-07-20 PROCEDURE — 81001 URINALYSIS AUTO W/SCOPE: CPT

## 2024-07-20 PROCEDURE — 96374 THER/PROPH/DIAG INJ IV PUSH: CPT | Mod: XU

## 2024-07-20 PROCEDURE — 82803 BLOOD GASES ANY COMBINATION: CPT

## 2024-07-20 PROCEDURE — 85014 HEMATOCRIT: CPT

## 2024-07-20 PROCEDURE — 96375 TX/PRO/DX INJ NEW DRUG ADDON: CPT

## 2024-07-20 PROCEDURE — 85025 COMPLETE CBC W/AUTO DIFF WBC: CPT

## 2024-07-20 PROCEDURE — 83690 ASSAY OF LIPASE: CPT

## 2024-07-20 RX ORDER — SODIUM CHLORIDE 0.9 % (FLUSH) 0.9 %
1000 SYRINGE (ML) INJECTION ONCE
Refills: 0 | Status: COMPLETED | OUTPATIENT
Start: 2024-07-20 | End: 2024-07-20

## 2024-07-20 RX ORDER — ONDANSETRON HYDROCHLORIDE 2 MG/ML
4 INJECTION INTRAMUSCULAR; INTRAVENOUS ONCE
Refills: 0 | Status: COMPLETED | OUTPATIENT
Start: 2024-07-20 | End: 2024-07-20

## 2024-07-20 RX ORDER — KETOROLAC TROMETHAMINE 30 MG/ML
15 INJECTION, SOLUTION INTRAMUSCULAR ONCE
Refills: 0 | Status: DISCONTINUED | OUTPATIENT
Start: 2024-07-20 | End: 2024-07-20

## 2024-07-20 RX ADMIN — KETOROLAC TROMETHAMINE 15 MILLIGRAM(S): 30 INJECTION, SOLUTION INTRAMUSCULAR at 02:58

## 2024-07-20 RX ADMIN — ONDANSETRON HYDROCHLORIDE 4 MILLIGRAM(S): 2 INJECTION INTRAMUSCULAR; INTRAVENOUS at 02:58

## 2024-07-20 RX ADMIN — Medication 1000 MILLILITER(S): at 02:57

## 2024-07-20 NOTE — ED ADULT NURSE NOTE - BREATHING, MLM
cystoscopy, right ureteroscopy, right stone extraction, right ureteral stent placement
Spontaneous, unlabored and symmetrical

## 2024-07-20 NOTE — ED PROVIDER NOTE - CLINICAL SUMMARY MEDICAL DECISION MAKING FREE TEXT BOX
38 male, history of high cholesterol, diabetes insulin-dependent, presents ED complaints of loose stools for the past month, yesterday began to have pain to right lower quadrant that was waxing and waning with episode of vomiting.  No recent antibiotic use.  States pain radiates to right flank and earlier this evening had episode of hematuria.  Denies dysuria, fever, chills.  Reports similar pain to right lower quadrant 2 weeks ago which resolved on its own.  Notes history of elevated liver function tenderness, been seen by gastroenterology and is pending test results.  VSS.  Patient well-appearing, no acute distress, heart regular rhythm, lungs clear, abdomen soft with mild right lower quadrant, right side and right flank tenderness to palpation, no gross motor or sensory deficits.  Differential includes but not limited to appendicitis, kidney stone, gastroenteritis.  Will obtain basic lab work, lipase, vbg, CT abdomen pelvis, IV fluids, analgesics, antiemetics, GI stool PCR if patient able to give sample.

## 2024-07-20 NOTE — ED ADULT NURSE NOTE - OBJECTIVE STATEMENT
39y/o Male presents to ED c/o RLQ abdominal pain radiating to the right flank and suprapubic region. Pt states he has had diarrhea for about 1 month and noticed blood tinged urine recently. Pt states prior to arrival at ED he has had an episode of emesis NBNB. Pt states being in 10/10 pain. Abdomen is soft, nontender non distended. LBM approx 10hrs prior to ED arrival, watery no blood, not black/tarry. PMHx t2DM on insulin, thyroid cancer, thyroidectomy on Synthroid, HLD. Pt denies headache, f/c/ chest pain, SOB. Pt is AxOx4. Pt demonstrates continence, is ambulatory unassisted. Pt states NKDA. Stretcher locked and in lowest position, appropriate side rails up. Pt instructed to notify RN if assistance is needed.

## 2024-07-20 NOTE — ED PROVIDER NOTE - ATTENDING CONTRIBUTION TO CARE
Sesar Frost MD (Attending Physician):    I performed a history and physical exam of the patient and discussed their management with the resident/fellow/ACP/student. I have reviewed the resident/fellow/ACP/student note and agree with the documented findings and plan of care, except as noted. I have personally performed a substantive portion of the visit including all aspects of the medical decision making. My medical decision making and observations are found below. Please refer to any progress notes for updates on clinical course.    HPI:  37yo male with pmhx of high cholesterol, diabetes insulin-dependent, thyroid malignant neoplasm presents with loose stools for the past month, yesterday began to have pain to right lower quadrant that is waxing and waning with one episode of NBNB vomiting. No recent antibiotic use. States pain radiates to right flank, and earlier this evening had episode of hematuria. Denies dysuria, fever, chills. Reports similar pain to right lower quadrant 2 weeks ago which resolved on its own. Pt says that he has hx of elevated LFTs, being evaluated by gastroenterology for this currently. Denies melena or BRBPR. Denies prior hx of abd surgeries.    PE:  GEN - NAD, well appearing, A&Ox3  HEAD - NC/AT  EYES - PERRL, EOMI  ENT - Airway patent, +mucous membranes dry  PULMONARY - CTA b/l, symmetric breath sounds, no W/R/R  CARDIAC - +S1S2, RRR, no M/G/R, no JVD  ABDOMEN - +BS, ND, +mildly TTP in RLQ, soft, no guarding, no rebound, no masses, no rigidity   - +Right CVA TTP  EXTREMITIES - FROM, symmetric pulses, no edema  SKIN - No rash or bruising  NEUROLOGIC - Alert, speech clear, no focal deficits  PSYCH - Normal mood/affect, normal insight    MDM:  DDx includes, but not limited to: appendicitis, kidney stone, UTI, pancreatitis, viral syndrome, diverticulitis, colitis. ekg, CT a/p, labs, urine, zofran, pain control, IVF. Dispo pending w/u.

## 2024-07-20 NOTE — ED PROVIDER NOTE - BIRTH SEX
Pt is non-verbal and does not follow commands at baseline. Vascular Neurology consulted given acute change from baseline. MRI with concern for evolution of prior strokes with noted differential of T2 hyperintensities including vasculitis vs demyelination. Discussed with Vascular Neurology; low concern for ongoing vasculitis/demyelination, likely represents typical evolution of prior infarcts.    Patient at baseline as of 4/22.     Plan  - STAT head imaging for concern of new change in mental status/focal deficit   Male

## 2024-07-20 NOTE — ED PROVIDER NOTE - NSFOLLOWUPINSTRUCTIONS_ED_ALL_ED_FT
You were seen in the Emergency Department for abdominal pain.  Your evaluation today shows your symptoms are not from any dangerous or life-threatening causes.  At home, you can take acetaminophen and/or ibuprofen as needed for pain.     1) Continue all previously prescribed medications as directed.    2) Follow up with your primary care physician - take copies of your results.    3) Return to the Emergency Department for worsening or persistent symptoms, and/or ANY NEW OR CONCERNING SYMPTOMS.

## 2024-07-20 NOTE — ED PROVIDER NOTE - PROGRESS NOTE DETAILS
Zeferino PGY2: ct negative for acute pathology.  UA with hematuria.  Possible passed kidney stone.  On re-eval patient feeling much improved and states pain has resolved.  initial lactate 2.2.  will repeat.  likely dc Zeferino PGY2: repeat lactate 1.7.  patient feeling well and tolerating PO.  will dc

## 2024-07-20 NOTE — ED ADULT NURSE NOTE - FINAL NURSING ELECTRONIC SIGNATURE
Eye Protection Verbiage: Before proceeding with the stage, a plastic scleral shield was inserted. The globe was anesthetized with a few drops of 1% lidocaine with 1:100,000 epinephrine. Then, an appropriate sized scleral shield was chosen and coated with lacrilube ointment. The shield was gently inserted and left in place for the duration of each stage. After the stage was completed, the shield was gently removed. 20-Jul-2024 07:20

## 2024-07-20 NOTE — ED PROVIDER NOTE - PATIENT PORTAL LINK FT
You can access the FollowMyHealth Patient Portal offered by [facility] by registering at the following website: [email or web address]. By joining [employer]’s FollowMyHealth portal, you will also be able to view your health information using other applications (apps) compatible with our system.

## 2024-07-21 LAB
CULTURE RESULTS: SIGNIFICANT CHANGE UP
SPECIMEN SOURCE: SIGNIFICANT CHANGE UP

## 2024-08-05 ENCOUNTER — APPOINTMENT (OUTPATIENT)
Dept: ENDOCRINOLOGY | Facility: CLINIC | Age: 39
End: 2024-08-05

## 2024-08-05 PROCEDURE — 83036 HEMOGLOBIN GLYCOSYLATED A1C: CPT | Mod: QW

## 2024-08-05 PROCEDURE — 99215 OFFICE O/P EST HI 40 MIN: CPT

## 2024-08-05 PROCEDURE — 95251 CONT GLUC MNTR ANALYSIS I&R: CPT

## 2024-08-05 NOTE — END OF VISIT
[] : Fellow [FreeTextEntry3] : 38-year-old man with history of type 2 diabetes, papillary thyroid cancer, obesity, hypertension, hyperlipidemia, here for endocrinology follow-up Regarding type 2 diabetes reported he is taking Basaglar 56 units at bedtime (he was recommended to take Basaglar 38 units twice daily, Ozempic 2.0 mg once weekly, metformin 500 mg daily for 1 month since last visit reports no adverse reactions and no improvement in glycemic control. CGM data review in target 30%, high 42%.  Notable for generalized hyperglycemia throughout the day and postprandial hyperglycemia after certain meals.  Recommend changing Basaglar to Toujeo for lower volume of administration, increase to Toujeo 65 units once daily at bedtime, recommend to continue with Ozempic 2.0 mg once weekly Recommend to start mealtime insulin NovoLog/Admelog/Humalog 12 units with his biggest meal of the day.  Follow-up with ACP in 3 months for glycemic control  Regarding thyroid cancer, thyroglobulin level slightly elevated in May 2024.  Repeat TSH and thyroglobulin level today.  JANETH intermediate risk for recurrence, therefore TSH goal between 0.1-0.5 uIU/mL.  Thyroid ultrasound done this year 2024 with no evidence of structural recurrence.  Informed patient that if thyroglobulin level is still elevated, would consider whole-body uptake and scan.  For hypothyroidism continue with levothyroxine 188 mcg once daily.  For hyperlipidemia, continue with atorvastatin 20 mg once daily, LDL goal <70 mg/dL.

## 2024-08-05 NOTE — ASSESSMENT
[Importance of Diet and Exercise] : importance of diet and exercise to improve glycemic control, achieve weight loss and improve cardiovascular health [Self Monitoring of Blood Glucose] : self monitoring of blood glucose [Insulin Self-Administration] : insulin self-administration [Retinopathy Screening] : Patient was referred to ophthalmology for retinopathy screening [FreeTextEntry1] : Patient is a 38-year-old male with history of diabetic ketoacidosis in the setting of newly diagnosed type 2 diabetes mellitus, prior antibody workup includes negative HAM, negative islet cell antibody, papillary thyroid cancer, obesity, hypertension, here for endocrinology follow-up.  1. Type 2 DM A1c improved to 7.9% today <-- A1c 10.1% (5/7/2024). Increase basal insulin to 65 units, will change from basaglar to toujeo for better absorption Start premeal insulin 12 units with largest meal of the day Continue Ozempic 2.0 mg once weekly. Continue Freestyle chris 3 CGM If no improvement with above changes, can consider Mounjaro Instructed to see ophthalmologist Foot exam within normal limits in 2024. ACR negative 5/2024 LDL 81 5/2024, on atorva 20mg qhs but with slight elevation in AST ALT, will monitor on current dose for now  2. Morbid obesity  BMI 46 Previously given referral to bariatric surgery. In the past he had issues with consideration of bariatric surgery because his sister had a bad experience and he noted of a relative who had passed away from the complications of bariatric surgery. But currently he is more open to the idea, he is still considering.  3. HLD LDL goal <70mg/dl LDL 81 5/2024, on atorva 20mg qhs but with slight elevation in AST ALT, will monitor on current dose for now  4. Blood pressure management.  BP at goal <130/80 UACR negative (5/2024)  5. Papillary thyroid cancer Patient is status post total thyroidectomy in December 2020.  Papillary thyroid cancer involving right lobe, classic variant with focal infiltrative follicular differentiation, 3.1 cm in greatest dimension.  3 out of 5 lymph nodes positive for metastatic carcinoma.   Patient underwent central neck dissection for the surgery. Status post radioactive iodine treatment 93mci in November 2021 Post therapy scan shows iodine avid tissue in the anterior neck/thyroid bed.  Stimulated thyroglobulin was 5.95 ng/mL. JANETH intermediate risk Currently taking levothyroxine 288 mcg once daily (dose increased 2/16/2024). TG was detectable 0.85 on last labs 5/2024. Repeat TUS afterwards in 5/2024: The thyroidectomy bed is unremarkable. There is no cervical adenopathy. Recheck TSH, TG today Will keep TSH between 0.1-0.5. If TG remains detectable, will obtain whole body scan  Discussed and seen with Dr. Neisha Ramos MD Endocrine Fellow

## 2024-08-05 NOTE — PHYSICAL EXAM
[Alert] : alert [Well Nourished] : well nourished [No Acute Distress] : no acute distress [Normal Sclera/Conjunctiva] : normal sclera/conjunctiva [Well Developed] : well developed [EOMI] : extra ocular movement intact [No Proptosis] : no proptosis [Normal Oropharynx] : the oropharynx was normal [Well Healed Scar] : well healed scar [No Respiratory Distress] : no respiratory distress [No Accessory Muscle Use] : no accessory muscle use [Clear to Auscultation] : lungs were clear to auscultation bilaterally [Normal S1, S2] : normal S1 and S2 [Normal Rate] : heart rate was normal [Regular Rhythm] : with a regular rhythm [No Edema] : no peripheral edema [Pedal Pulses Normal] : the pedal pulses are present [Normal Bowel Sounds] : normal bowel sounds [Not Tender] : non-tender [Not Distended] : not distended [Soft] : abdomen soft [Normal Anterior Cervical Nodes] : no anterior cervical lymphadenopathy [No Spinal Tenderness] : no spinal tenderness [Spine Straight] : spine straight [No Stigmata of Cushings Syndrome] : no stigmata of Cushings Syndrome [Normal Gait] : normal gait [Normal Strength/Tone] : muscle strength and tone were normal [No Rash] : no rash [Normal] : normal [Full ROM] : with full range of motion [Normal Reflexes] : deep tendon reflexes were 2+ and symmetric [No Tremors] : no tremors [Oriented x3] : oriented to person, place, and time [Diminished Throughout Both Feet] : normal tactile sensation with monofilament testing throughout both feet [Obese] : obese [Acanthosis Nigricans] : no acanthosis nigricans

## 2024-08-20 RX ORDER — INSULIN GLARGINE 300 [IU]/ML
300 INJECTION, SOLUTION SUBCUTANEOUS
Qty: 15 | Refills: 1 | Status: ACTIVE | COMMUNITY
Start: 2024-08-20 | End: 1900-01-01

## 2024-09-05 ENCOUNTER — RX RENEWAL (OUTPATIENT)
Age: 39
End: 2024-09-05

## 2024-09-12 NOTE — DISCHARGE NOTE NURSING/CASE MANAGEMENT/SOCIAL WORK - NURSING SECTION COMPLETE
September 12, 2024    Hello, may I speak with Aimee Mirza?    My name is Giovani      I am  with [facility]  [address], [address]  [place] [address] [phone].    Before we get started may I verify your date of birth? 1975    I am calling to officially welcome you to our practice and ask about your recent visit. Is this a good time to talk? yes    Tell me about your visit with us. What things went well?  Everything was great       We're always looking for ways to make our patients' experiences even better. Do you have recommendations on ways we may improve?  no    Overall were you satisfied with your first visit to our practice? yes       I appreciate you taking the time to speak with me today. Is there anything else I can do for you? no      Thank you, and have a great day.      
Patient/Caregiver provided printed discharge information.

## 2024-09-17 ENCOUNTER — RX RENEWAL (OUTPATIENT)
Age: 39
End: 2024-09-17

## 2024-11-13 ENCOUNTER — APPOINTMENT (OUTPATIENT)
Dept: ENDOCRINOLOGY | Facility: CLINIC | Age: 39
End: 2024-11-13

## 2024-12-23 NOTE — ED PROVIDER NOTE - NS ED NOTE AC HIGH RISK COUNTRIES
OUTPATIENT PROGRESS NOTE      CHIEF COMPLAINT:  Chief Complaint   Patient presents with    Office Visit       HPI:  The patient presented to the office for 4 weeks pvd Right eye follow up. Floater still present in vision; denies noticing any new flashes or carranza of lights.     Moriah  gave us verbal permission to discuss their medical condition in the presence of the following individual(s) in the room: n/a    VISUAL ACUITY:  Corrected/uncorrected:   Visual Acuity (Snellen - Linear)         Right Left    Dist cc 20/25 +2 20/20      Correction: Glasses              SOCIAL HISTORY:  Social History     Socioeconomic History    Marital status: /Civil Union     Spouse name: Not on file    Number of children: 4    Years of education: Not on file    Highest education level: 12th grade   Occupational History    Occupation: CNA/HUC     Employer: [employer] ( ALL SITES)     Comment: [employer]   Tobacco Use    Smoking status: Never    Smokeless tobacco: Never   Vaping Use    Vaping status: never used   Substance and Sexual Activity    Alcohol use: Yes     Alcohol/week: 0.0 standard drinks of alcohol     Comment: very rarely     Drug use: No    Sexual activity: Yes     Partners: Male     Birth control/protection: Surgical     Comment: BTL   Other Topics Concern    Not on file   Social History Narrative    Not on file     Social Determinants of Health     Financial Resource Strain: Not on file   Food Insecurity: Not on file   Transportation Needs: Not on file   Physical Activity: Not on file   Stress: Not on file   Social Connections: Not on file   Interpersonal Safety: Not on file     I reviewed testing done by technician found in "[employer].    OPHTHALMIC EXAMINATION:  Base Eye Exam       Visual Acuity (Snellen - Linear)         Right Left    Dist cc 20/25 +2 20/20      Correction: Glasses              Tonometry (Tonopen, 3:13 PM)         Right Left    Pressure 17 14              Pupils         Pupils Dark Light Shape  React APD    Right PERRL 4 3 Round Slow Negative    Left PERRL 4 3 Round Slow Negative              Visual Fields (Counting fingers)         Left Right     Full Full              Extraocular Movement         Right Left     Full Full              Neuro/Psych       Oriented x3: Yes    Mood/Affect: Normal              Dilation       Right eye: 2.5% Phenylephrine / 1% Tropicamide @ 3:13 PM                  Slit Lamp and Fundus Exam       External Exam         Right Left    External Normal               Slit Lamp Exam         Right Left    Lids/Lashes 1+ MGD, few capped glands     Conjunctiva/Sclera Clear     Cornea Clear     Anterior Chamber Deep and quiet     Iris Round and regular     Lens Clear     Vitreous Posterior vitreous detachment               Fundus Exam         Right Left    Disc Flat, pink with a healthy rim     C/D Ratio 0.2     Macula Healthy with sharp foveal reflex     Vessels Healthy with normal Artery-Vein ratio     Periphery Flat, healthy, without tears or detachments                       ASSESSMENT:   1. PVD (posterior vitreous detachment), right eye    2. Subjective visual disturbance          PLAN:  1&2.  Stable condition right eye PVD. Discussed the nature of the condition. Call the clinic if any bright flashes of light, increased floaters or curtain of vision loss.     FOLLOW UP:  No follow-ups on file.     No

## 2024-12-26 ENCOUNTER — RX RENEWAL (OUTPATIENT)
Age: 39
End: 2024-12-26

## 2025-02-24 NOTE — DISCHARGE NOTE PROVIDER - NSDCADMDATE_GEN_ALL_CORE_FT
/62  The current medical regimen is effective;  continue present plan and medications.     26-Aug-2023 19:43

## 2025-02-25 ENCOUNTER — APPOINTMENT (OUTPATIENT)
Dept: ENDOCRINOLOGY | Facility: CLINIC | Age: 40
End: 2025-02-25

## 2025-02-25 PROCEDURE — 99214 OFFICE O/P EST MOD 30 MIN: CPT | Mod: 95

## 2025-03-04 ENCOUNTER — RX RENEWAL (OUTPATIENT)
Age: 40
End: 2025-03-04

## 2025-05-19 ENCOUNTER — APPOINTMENT (OUTPATIENT)
Dept: ENDOCRINOLOGY | Facility: CLINIC | Age: 40
End: 2025-05-19

## 2025-05-28 NOTE — DIETITIAN INITIAL EVALUATION ADULT - CALCULATED TO (G/KG)
PATIENT INSTRUCTIONS:     I am recommending a multidisciplinary treatment plan to help this patient better manage his pain. The following recommendations were given to the patient. Diagnosis, treatment options, risks, benefits, and alternatives were discussed; all questions were answered. Self-care instructions were given. The patient expressed understanding of the plan for management.   Medication Management:   - STOP taking Oxycodone 5mg when you run out of the current prescription by 6/01/25   - START taking Percocet 7.5/325mg - 1 tab by mouth every 6 hours as needed for pain; max 3 per day    - START taking Pregabalin 25mg - take 25mg at bedtime for 7 days, then take 25mg in AM and 25mg at bedtime       Continue Current Treatment Plan with:   Spine Surgeon through [facility]       Return to Clinic:   -  30-minute In-Person Appointment with Destiny He DNP, MICAELA, DENISSE in 4 weeks, or sooner if needed      Future Considerations:   No future refills through Surgery Team; request refills from Pain Management      ----------------------------------------------------------------  Clinic Number:  [phone]   Call with any questions about your care and for scheduling assistance.   Calls are returned Monday through Friday between 8 AM and 4:30 PM. We usually get back to you within 2 business days depending on the issue/request.    If we are prescribing your medications:  For opioid medication refills, call the clinic or send a [employer] message 7 days in advance.  Please include:  Name of requested medication  Name of the pharmacy.  For non-opioid medications, call your pharmacy directly to request a refill. Please allow 3-4 days to be processed.   Per [place] State Law:  All controlled substance prescriptions must be filled within 30 days of being written.    For those controlled substances allowing refills, pickup must occur within 30 days of last fill.      We believe regular attendance is key to your success in our  program!    Any time you are unable to keep your appointment we ask that you call us at least 24 hours in advance to cancel.This will allow us to offer the appointment time to another patient.   Multiple missed appointments may lead to dismissal from the clinic.    111.93

## 2025-08-25 ENCOUNTER — RX RENEWAL (OUTPATIENT)
Age: 40
End: 2025-08-25